# Patient Record
Sex: FEMALE | Race: WHITE | NOT HISPANIC OR LATINO | Employment: OTHER | URBAN - METROPOLITAN AREA
[De-identification: names, ages, dates, MRNs, and addresses within clinical notes are randomized per-mention and may not be internally consistent; named-entity substitution may affect disease eponyms.]

---

## 2017-01-20 DIAGNOSIS — H40.053 BORDERLINE GLAUCOMA OF BOTH EYES WITH OCULAR HYPERTENSION: Primary | ICD-10-CM

## 2017-01-24 ENCOUNTER — OFFICE VISIT (OUTPATIENT)
Dept: OPHTHALMOLOGY | Facility: CLINIC | Age: 82
End: 2017-01-24
Payer: MEDICARE

## 2017-01-24 DIAGNOSIS — H43.819 PVD (POSTERIOR VITREOUS DETACHMENT), UNSPECIFIED LATERALITY: ICD-10-CM

## 2017-01-24 DIAGNOSIS — H52.7 REFRACTIVE ERROR: ICD-10-CM

## 2017-01-24 DIAGNOSIS — H40.053 BORDERLINE GLAUCOMA OF BOTH EYES WITH OCULAR HYPERTENSION: Primary | ICD-10-CM

## 2017-01-24 DIAGNOSIS — Z96.1 PSEUDOPHAKIA: ICD-10-CM

## 2017-01-24 DIAGNOSIS — H25.12 NUCLEAR SCLEROSIS, LEFT: ICD-10-CM

## 2017-01-24 PROCEDURE — 92012 INTRM OPH EXAM EST PATIENT: CPT | Mod: S$PBB,,, | Performed by: OPHTHALMOLOGY

## 2017-01-24 PROCEDURE — 99213 OFFICE O/P EST LOW 20 MIN: CPT | Mod: PBBFAC,PO | Performed by: OPHTHALMOLOGY

## 2017-01-24 PROCEDURE — 99999 PR PBB SHADOW E&M-EST. PATIENT-LVL III: CPT | Mod: PBBFAC,,, | Performed by: OPHTHALMOLOGY

## 2017-01-24 RX ORDER — GABAPENTIN 300 MG/1
300 CAPSULE ORAL 3 TIMES DAILY
Refills: 5 | COMMUNITY
Start: 2016-12-08 | End: 2017-03-14

## 2017-01-24 RX ORDER — LATANOPROST 50 UG/ML
SOLUTION/ DROPS OPHTHALMIC
Qty: 8 ML | Refills: 3 | Status: SHIPPED | OUTPATIENT
Start: 2017-01-24 | End: 2017-01-24 | Stop reason: SDUPTHER

## 2017-01-24 RX ORDER — IBUPROFEN 400 MG/1
400 TABLET ORAL EVERY 6 HOURS PRN
COMMUNITY
End: 2020-01-28

## 2017-01-24 RX ORDER — DORZOLAMIDE HYDROCHLORIDE AND TIMOLOL MALEATE 20; 5 MG/ML; MG/ML
1 SOLUTION/ DROPS OPHTHALMIC 2 TIMES DAILY
Qty: 30 ML | Refills: 3 | Status: SHIPPED | OUTPATIENT
Start: 2017-01-24 | End: 2017-06-09 | Stop reason: SDUPTHER

## 2017-01-24 RX ORDER — MELOXICAM 15 MG/1
TABLET ORAL
Refills: 6 | COMMUNITY
Start: 2016-11-30 | End: 2017-03-14

## 2017-01-24 RX ORDER — LATANOPROST 50 UG/ML
SOLUTION/ DROPS OPHTHALMIC
Qty: 8 ML | Refills: 3 | Status: SHIPPED | OUTPATIENT
Start: 2017-01-24 | End: 2018-02-09 | Stop reason: SDUPTHER

## 2017-01-24 NOTE — MR AVS SNAPSHOT
[facility]  [address] [facility] [address]  [place] [address]  Phone: [phone]  Fax: [phone]                  Libby sEtrada   2017 8:00 AM   Office Visit    Description:  Female : 1935   Provider:  Edelmira Engel MD   Department:  [facility]           Reason for Visit     Glaucoma           Diagnoses this Visit        Comments    Borderline glaucoma of both eyes with ocular hypertension    -  Primary     Nuclear sclerosis, left         Pseudophakia         PVD (posterior vitreous detachment), unspecified laterality         Refractive error                To Do List           Goals (5 Years of Data)     None      Follow-Up and Disposition     Return in about 4 months (around 2017) for HVF, HRT, DFE, IOP check, MRx, BAT.       These Medications        Disp Refills Start End    dorzolamide-timolol 2-0.5% (COSOPT) 22.3-6.8 mg/mL ophthalmic solution 30 mL 3 2017     Place 1 drop into the right eye 2 (two) times daily. To replace Timolol (yellow lid) - Right Eye    Pharmacy: [facility]/pharmacy #[id number] - [place] - [id number] Ph #: [phone]       Notes to Pharmacy: 90 day supply    latanoprost 0.005 % ophthalmic solution 8 mL 3 2017     PLACE 1 DROP INTO BOTH EYES EVERY EVENING.    Pharmacy: [facility]/pharmacy #[id number] - [place] - [id number] Ph #: [phone]       Notes to Pharmacy: Please dispense 90 day supply      [facility]     [facility] -  Assistance  Registered nurses in the [facility] provide clinical advisement, health education, appointment booking, and other advisory services.  Call for this free service at [phone].             Medications           Message regarding Medications     Verify the changes and/or additions to your medication regime listed below are the same as discussed with your clinician today.  If any of these changes or additions are incorrect, please notify your  healthcare provider.             Verify that the below list of medications is an accurate representation of the medications you are currently taking.  If none reported, the list may be blank. If incorrect, please contact your healthcare provider. Carry this list with you in case of emergency.           Current Medications     aspirin (ECOTRIN) 81 MG EC tablet Take 81 mg by mouth once daily.      cyanocobalamin 2000 MCG tablet Take 2,000 mcg by mouth once daily.    dorzolamide-timolol 2-0.5% (COSOPT) 22.3-6.8 mg/mL ophthalmic solution Place 1 drop into the right eye 2 (two) times daily. To replace Timolol (yellow lid)    latanoprost 0.005 % ophthalmic solution PLACE 1 DROP INTO BOTH EYES EVERY EVENING.    MULTIVITAMIN W-MINERALS/LUTEIN (CENTRUM SILVER ORAL) Take by mouth.    PREVNAR 13, PF, 0.5 mL Syrg     gabapentin (NEURONTIN) 300 MG capsule Take 300 mg by mouth 3 (three) times daily.    ibuprofen (ADVIL,MOTRIN) 400 MG tablet Take 400 mg by mouth every 6 (six) hours as needed for Other. Not sure of mg    meloxicam (MOBIC) 15 MG tablet TAKE ONE BY MOUTH DAILY.           Clinical Reference Information           Vital Signs - Last Recorded     LMP                   (LMP Unknown)           Allergies as of 1/24/2017     Polysporin [Bacitracin-polymyxin B]    Codeine    Gamma Immune Glob From Whey    Penicillins    Sulfa (Sulfonamide Antibiotics)    Bacitracin      Immunizations Administered on Date of Encounter - 1/24/2017     None      Instructions      The doctor plans to dilate your eyes at your next visit. This will cause you to be sensitive to bright light, and may blur your vision. The effects may last a few hours. If you do not feel comfortable driving with your eyes dilated, please have someone drive you to your appointment.

## 2017-01-24 NOTE — PATIENT INSTRUCTIONS
The doctor plans to dilate your eyes at your next visit. This will cause you to be sensitive to bright light, and may blur your vision. The effects may last a few hours. If you do not feel comfortable driving with your eyes dilated, please have someone drive you to your appointment.

## 2017-03-01 ENCOUNTER — PATIENT OUTREACH (OUTPATIENT)
Dept: ADMINISTRATIVE | Facility: HOSPITAL | Age: 82
End: 2017-03-01

## 2017-03-01 NOTE — LETTER
March 1, 2017    Libby Estrada  [address]  [facility] [address]             [facility]  [address]  [facility] [address]  Phone: [phone] Dear Mrs. Estrada:    [facility] is committed to your overall health.  To help you get the most out of each of your visits, we will review your information to make sure you are up to date on all of your recommended tests and/or procedures.      Dr. Salas      has found that you may be due for:    Tetanus immunization  Pneumonia immunization  Influenza vaccine    If you have had any of the above done at another facility, please bring the records or information with you so that your record at [facility] will be complete.     If you are currently taking medication , please bring it with you to your appointment for review.    If you have any questions or concerns, please don't hesitate to call.    Sincerely,  Brenna Boyer  Clinical Care Coordinator  [facility]  [address] [place] [address]  Phone: [phone]   Fax: [phone]

## 2017-03-14 ENCOUNTER — OFFICE VISIT (OUTPATIENT)
Dept: FAMILY MEDICINE | Facility: CLINIC | Age: 82
End: 2017-03-14
Payer: MEDICARE

## 2017-03-14 VITALS
WEIGHT: 149.94 LBS | OXYGEN SATURATION: 97 % | BODY MASS INDEX: 24.98 KG/M2 | HEART RATE: 94 BPM | RESPIRATION RATE: 18 BRPM | DIASTOLIC BLOOD PRESSURE: 72 MMHG | HEIGHT: 65 IN | SYSTOLIC BLOOD PRESSURE: 132 MMHG

## 2017-03-14 DIAGNOSIS — R73.03 BORDERLINE DIABETES: ICD-10-CM

## 2017-03-14 DIAGNOSIS — M48.00 SPINAL STENOSIS, UNSPECIFIED SPINAL REGION: ICD-10-CM

## 2017-03-14 DIAGNOSIS — R73.9 ELEVATED BLOOD SUGAR: ICD-10-CM

## 2017-03-14 DIAGNOSIS — E78.5 HYPERLIPIDEMIA, UNSPECIFIED HYPERLIPIDEMIA TYPE: Primary | ICD-10-CM

## 2017-03-14 DIAGNOSIS — Z85.810 HISTORY OF TONGUE CANCER: ICD-10-CM

## 2017-03-14 DIAGNOSIS — Z23 NEED FOR TETANUS BOOSTER: ICD-10-CM

## 2017-03-14 PROCEDURE — 99214 OFFICE O/P EST MOD 30 MIN: CPT | Mod: S$PBB,,, | Performed by: FAMILY MEDICINE

## 2017-03-14 PROCEDURE — 90714 TD VACC NO PRESV 7 YRS+ IM: CPT | Mod: PBBFAC,PO | Performed by: FAMILY MEDICINE

## 2017-03-14 PROCEDURE — 99213 OFFICE O/P EST LOW 20 MIN: CPT | Mod: PBBFAC,PO,25 | Performed by: FAMILY MEDICINE

## 2017-03-14 PROCEDURE — 99999 PR PBB SHADOW E&M-EST. PATIENT-LVL III: CPT | Mod: PBBFAC,,, | Performed by: FAMILY MEDICINE

## 2017-03-14 RX ORDER — CHOLECALCIFEROL (VITAMIN D3) 25 MCG
2000 TABLET ORAL DAILY
COMMUNITY
End: 2017-12-20

## 2017-03-14 NOTE — MR AVS SNAPSHOT
[facility]  [address]  [facility] [address]  Phone: [phone]  Fax: [phone]                  Libby Estrada   3/14/2017 2:20 PM   Office Visit    Description:  Female : 1935   Provider:  DOMINIC Salas MD   Department:  [facility]           Reason for Visit     Annual Exam           Diagnoses this Visit        Comments    Hyperlipidemia, unspecified hyperlipidemia type    -  Primary     Borderline diabetes         History of tongue cancer         Elevated blood sugar         Spinal stenosis, unspecified spinal region         Need for tetanus booster                To Do List           Future Appointments        Provider Department Dept Phone    3/15/2017 10:55 AM LAB, [place] [facility] [phone]    2017 9:00 AM [facility] [phone]    2017 9:30 AM [facility] [phone]    2017 10:15 AM Edelmira Engel MD [facility] [phone]    2017 10:40 AM DOMINIC Salas MD [facility] [phone]      Goals (5 Years of Data)     None      Follow-Up and Disposition     Return in about 6 months (around 2017), or if symptoms worsen or fail to improve.      [facility]     [facility] Nurse Care [facility] - 24/7 Assistance  Registered nurses in the [facility] provide clinical advisement, health education, appointment booking, and other advisory services.  Call for this free service at [phone].             Medications           Message regarding Medications     Verify the changes and/or additions to your medication regime listed below are the same as discussed with your clinician today.  If any of these changes or additions are incorrect, please notify your healthcare provider.        STOP taking these medications     gabapentin (NEURONTIN) 300 MG capsule Take 300 mg by mouth 3  "(three) times daily.    meloxicam (MOBIC) 15 MG tablet TAKE ONE BY MOUTH DAILY.    PREVNAR 13, PF, 0.5 mL Syrg     cyanocobalamin 2000 MCG tablet Take 2,000 mcg by mouth once daily.           Verify that the below list of medications is an accurate representation of the medications you are currently taking.  If none reported, the list may be blank. If incorrect, please contact your healthcare provider. Carry this list with you in case of emergency.           Current Medications     aspirin (ECOTRIN) 81 MG EC tablet Take 81 mg by mouth once daily.      dorzolamide-timolol 2-0.5% (COSOPT) 22.3-6.8 mg/mL ophthalmic solution Place 1 drop into the right eye 2 (two) times daily. To replace Timolol (yellow lid)    ibuprofen (ADVIL,MOTRIN) 400 MG tablet Take 400 mg by mouth every 6 (six) hours as needed for Other. Not sure of mg    latanoprost 0.005 % ophthalmic solution PLACE 1 DROP INTO BOTH EYES EVERY EVENING.    MULTIVITAMIN W-MINERALS/LUTEIN (CENTRUM SILVER ORAL) Take by mouth.    vitamin D 1000 units Tab Take 2,000 Units by mouth once daily.           Clinical Reference Information           Your Vitals Were     BP Pulse Resp Height Weight Last Period    144/72 (BP Location: Left arm, Patient Position: Sitting, BP Method: Manual) 94 18 5' 5" (1.651 m) 68 kg (149 lb 14.6 oz) (LMP Unknown)    SpO2 BMI             97% 24.95 kg/m2         Blood Pressure          Most Recent Value    BP  (!)  144/72      Allergies as of 3/14/2017     Polysporin [Bacitracin-polymyxin B]    Codeine    Gamma Immune Glob From Whey    Lidocaine    Penicillins    Sulfa (Sulfonamide Antibiotics)    Bacitracin      Immunizations Administered on Date of Encounter - 3/14/2017     Name Date Dose VIS Date Route    TD 3/14/2017 0.5 mL 2/24/2015 Intramuscular      Orders Placed During Today's Visit      Normal Orders This Visit    Ambulatory referral to ENT     Td Vaccine- Preservative Free     Future Labs/Procedures Expected by Expires    CBC auto " differential  3/14/2017 9/10/2017    Comprehensive metabolic panel  3/14/2017 9/10/2017    Hemoglobin A1c  3/14/2017 9/10/2017    Lipid panel  3/14/2017 9/10/2017    TSH  3/14/2017 9/10/2017      Language Assistance Services     ATTENTION: Language assistance services are available, free of charge. Please call [phone].      ATENCIÓN: Si habla español, tiene a aguillon disposición servicios gratuitos de asistencia lingüística. Llame al [phone].     CHÚ Ý: N?u b?n nói Ti?ng Vi?t, có các d?ch v? h? tr? ngôn ng? mi?n phí dành cho b?n. G?i s? [phone].         [facility] complies with applicable Federal civil rights laws and does not discriminate on the basis of race, color, national origin, age, disability, or sex.

## 2017-03-14 NOTE — PROGRESS NOTES
Subjective:       Patient ID: Libby Estrada is a 82 y.o. female.    Chief Complaint: Annual Exam    HPI Comments: Here for f/u chronic medical issues.  States doing well overall.  Dealing with spinal stenosis and doing pool therapy.  Had tongue cancer removed from oral surgeon but paying out of cash so would like to see ENT.      Review of Systems   Constitutional: Negative for chills, fatigue and fever.   Respiratory: Negative for cough, chest tightness and shortness of breath.    Cardiovascular: Negative for chest pain, palpitations and leg swelling.   Endocrine: Negative for cold intolerance and heat intolerance.   Musculoskeletal: Positive for back pain.   Skin: Negative for rash and wound.   Psychiatric/Behavioral: Negative for dysphoric mood. The patient is not nervous/anxious.        Objective:      Physical Exam   Constitutional: She appears well-developed and well-nourished.   HENT:   Head: Normocephalic and atraumatic.   Cardiovascular: Normal rate, regular rhythm and normal heart sounds.    Pulmonary/Chest: Effort normal and breath sounds normal.   Psychiatric: She has a normal mood and affect.   Nursing note and vitals reviewed.      Assessment:       1. Hyperlipidemia, unspecified hyperlipidemia type    2. Borderline diabetes    3. History of tongue cancer    4. Elevated blood sugar    5. Spinal stenosis, unspecified spinal region    6. Need for tetanus booster        Plan:       Hyperlipidemia, unspecified hyperlipidemia type  -     CBC auto differential; Future; Expected date: 3/14/17  -     Comprehensive metabolic panel; Future; Expected date: 3/14/17  -     Lipid panel; Future; Expected date: 3/14/17  -     Hemoglobin A1c; Future; Expected date: 3/14/17  -     TSH; Future; Expected date: 3/14/17    Borderline diabetes  -     CBC auto differential; Future; Expected date: 3/14/17  -     Comprehensive metabolic panel; Future; Expected date: 3/14/17  -     Lipid panel; Future; Expected date: 3/14/17  -      Hemoglobin A1c; Future; Expected date: 3/14/17  -     TSH; Future; Expected date: 3/14/17    History of tongue cancer  -     Ambulatory referral to ENT  -     CBC auto differential; Future; Expected date: 3/14/17  -     Comprehensive metabolic panel; Future; Expected date: 3/14/17  -     Lipid panel; Future; Expected date: 3/14/17  -     Hemoglobin A1c; Future; Expected date: 3/14/17  -     TSH; Future; Expected date: 3/14/17    Elevated blood sugar  -     Hemoglobin A1c; Future; Expected date: 3/14/17    Spinal stenosis, unspecified spinal region    Need for tetanus booster  -     Td Vaccine- Preservative Free    Will monitor chronic medical issues and continue current plan of care.  Update labs and health maintenance.  Return in about 6 months (around 9/14/2017), or if symptoms worsen or fail to improve.

## 2017-03-15 ENCOUNTER — LAB VISIT (OUTPATIENT)
Dept: LAB | Facility: HOSPITAL | Age: 82
End: 2017-03-15
Attending: FAMILY MEDICINE
Payer: MEDICARE

## 2017-03-15 DIAGNOSIS — Z85.810 HISTORY OF TONGUE CANCER: ICD-10-CM

## 2017-03-15 DIAGNOSIS — R73.9 ELEVATED BLOOD SUGAR: ICD-10-CM

## 2017-03-15 DIAGNOSIS — E78.5 HYPERLIPIDEMIA, UNSPECIFIED HYPERLIPIDEMIA TYPE: ICD-10-CM

## 2017-03-15 DIAGNOSIS — R73.03 BORDERLINE DIABETES: ICD-10-CM

## 2017-03-15 LAB
ALBUMIN SERPL BCP-MCNC: 3.8 G/DL
ALP SERPL-CCNC: 61 U/L
ALT SERPL W/O P-5'-P-CCNC: 11 U/L
ANION GAP SERPL CALC-SCNC: 10 MMOL/L
AST SERPL-CCNC: 15 U/L
BASOPHILS # BLD AUTO: 0.02 K/UL
BASOPHILS NFR BLD: 0.3 %
BILIRUB SERPL-MCNC: 0.4 MG/DL
BUN SERPL-MCNC: 14 MG/DL
CALCIUM SERPL-MCNC: 9.7 MG/DL
CHLORIDE SERPL-SCNC: 104 MMOL/L
CHOLEST/HDLC SERPL: 4 {RATIO}
CO2 SERPL-SCNC: 26 MMOL/L
CREAT SERPL-MCNC: 0.8 MG/DL
DIFFERENTIAL METHOD: NORMAL
EOSINOPHIL # BLD AUTO: 0.2 K/UL
EOSINOPHIL NFR BLD: 2.4 %
ERYTHROCYTE [DISTWIDTH] IN BLOOD BY AUTOMATED COUNT: 13.2 %
EST. GFR  (AFRICAN AMERICAN): >60 ML/MIN/1.73 M^2
EST. GFR  (NON AFRICAN AMERICAN): >60 ML/MIN/1.73 M^2
GLUCOSE SERPL-MCNC: 94 MG/DL
HCT VFR BLD AUTO: 38.4 %
HDL/CHOLESTEROL RATIO: 24.9 %
HDLC SERPL-MCNC: 237 MG/DL
HDLC SERPL-MCNC: 59 MG/DL
HGB BLD-MCNC: 12.7 G/DL
LDLC SERPL CALC-MCNC: 157 MG/DL
LYMPHOCYTES # BLD AUTO: 1.7 K/UL
LYMPHOCYTES NFR BLD: 27.1 %
MCH RBC QN AUTO: 28.8 PG
MCHC RBC AUTO-ENTMCNC: 33.1 %
MCV RBC AUTO: 87 FL
MONOCYTES # BLD AUTO: 0.5 K/UL
MONOCYTES NFR BLD: 8.2 %
NEUTROPHILS # BLD AUTO: 3.9 K/UL
NEUTROPHILS NFR BLD: 62 %
NONHDLC SERPL-MCNC: 178 MG/DL
PLATELET # BLD AUTO: 254 K/UL
PMV BLD AUTO: 10.2 FL
POTASSIUM SERPL-SCNC: 4.3 MMOL/L
PROT SERPL-MCNC: 7.7 G/DL
RBC # BLD AUTO: 4.41 M/UL
SODIUM SERPL-SCNC: 140 MMOL/L
TRIGL SERPL-MCNC: 105 MG/DL
TSH SERPL DL<=0.005 MIU/L-ACNC: 0.72 UIU/ML
WBC # BLD AUTO: 6.23 K/UL

## 2017-03-15 PROCEDURE — 85025 COMPLETE CBC W/AUTO DIFF WBC: CPT

## 2017-03-15 PROCEDURE — 83036 HEMOGLOBIN GLYCOSYLATED A1C: CPT

## 2017-03-15 PROCEDURE — 80061 LIPID PANEL: CPT

## 2017-03-15 PROCEDURE — 84443 ASSAY THYROID STIM HORMONE: CPT

## 2017-03-15 PROCEDURE — 80053 COMPREHEN METABOLIC PANEL: CPT

## 2017-03-15 PROCEDURE — 36415 COLL VENOUS BLD VENIPUNCTURE: CPT | Mod: PO

## 2017-03-16 LAB
ESTIMATED AVG GLUCOSE: 123 MG/DL
HBA1C MFR BLD HPLC: 5.9 %

## 2017-05-17 ENCOUNTER — TELEPHONE (OUTPATIENT)
Dept: OPHTHALMOLOGY | Facility: CLINIC | Age: 82
End: 2017-05-17

## 2017-05-17 NOTE — TELEPHONE ENCOUNTER
----- Message from Jaimie Cortez sent at 5/17/2017 12:38 PM CDT -----  Contact: self  Patient has an appointment on 5/823/17 for visual field   She would like to reschedule    Please call her at  to reschedule    Thanks

## 2017-05-26 ENCOUNTER — CLINICAL SUPPORT (OUTPATIENT)
Dept: OPHTHALMOLOGY | Facility: CLINIC | Age: 82
End: 2017-05-26
Attending: OPHTHALMOLOGY
Payer: MEDICARE

## 2017-05-26 DIAGNOSIS — H40.053 BORDERLINE GLAUCOMA OF BOTH EYES WITH OCULAR HYPERTENSION: ICD-10-CM

## 2017-05-26 PROCEDURE — 92083 EXTENDED VISUAL FIELD XM: CPT | Mod: PBBFAC,PO

## 2017-05-26 PROCEDURE — 92083 EXTENDED VISUAL FIELD XM: CPT | Mod: 26,S$PBB,, | Performed by: OPHTHALMOLOGY

## 2017-05-30 ENCOUNTER — PATIENT MESSAGE (OUTPATIENT)
Dept: OPHTHALMOLOGY | Facility: CLINIC | Age: 82
End: 2017-05-30

## 2017-06-09 ENCOUNTER — OFFICE VISIT (OUTPATIENT)
Dept: OPHTHALMOLOGY | Facility: CLINIC | Age: 82
End: 2017-06-09
Payer: MEDICARE

## 2017-06-09 DIAGNOSIS — Z96.1 PSEUDOPHAKIA: ICD-10-CM

## 2017-06-09 DIAGNOSIS — H26.491 POSTERIOR CAPSULE OPACIFICATION, RIGHT: ICD-10-CM

## 2017-06-09 DIAGNOSIS — H40.053 BORDERLINE GLAUCOMA OF BOTH EYES WITH OCULAR HYPERTENSION: Primary | ICD-10-CM

## 2017-06-09 DIAGNOSIS — H25.12 NUCLEAR SCLEROSIS, LEFT: ICD-10-CM

## 2017-06-09 DIAGNOSIS — H43.811 PVD (POSTERIOR VITREOUS DETACHMENT), RIGHT: ICD-10-CM

## 2017-06-09 DIAGNOSIS — R73.03 BORDERLINE DIABETES: ICD-10-CM

## 2017-06-09 DIAGNOSIS — H52.7 REFRACTIVE ERROR: ICD-10-CM

## 2017-06-09 PROCEDURE — 99999 PR PBB SHADOW E&M-EST. PATIENT-LVL II: CPT | Mod: PBBFAC,,, | Performed by: OPHTHALMOLOGY

## 2017-06-09 PROCEDURE — 92134 CPTRZ OPH DX IMG PST SGM RTA: CPT | Mod: 50,PBBFAC,PO | Performed by: OPHTHALMOLOGY

## 2017-06-09 PROCEDURE — 92014 COMPRE OPH EXAM EST PT 1/>: CPT | Mod: S$PBB,,, | Performed by: OPHTHALMOLOGY

## 2017-06-09 PROCEDURE — 99212 OFFICE O/P EST SF 10 MIN: CPT | Mod: PBBFAC,PO | Performed by: OPHTHALMOLOGY

## 2017-06-09 RX ORDER — DORZOLAMIDE HYDROCHLORIDE AND TIMOLOL MALEATE 20; 5 MG/ML; MG/ML
1 SOLUTION/ DROPS OPHTHALMIC 2 TIMES DAILY
Qty: 30 ML | Refills: 3 | Status: SHIPPED | OUTPATIENT
Start: 2017-06-09 | End: 2018-08-08 | Stop reason: SDUPTHER

## 2017-06-09 NOTE — PROGRESS NOTES
HPI     4 month IOP check , denies eye pain, using all eye gtts as directed   Latanoprost ou HS, Cosopt OD BID    Agree with above. Did not miss drops recently.     Last edited by Edelmira Engel MD on 6/9/2017 11:37 AM.   (History)        ROS     Positive for: Gastrointestinal (GERD - now resolved off meds),   Musculoskeletal (back pain, worse in am when she gets up), Cardiovascular   (treated for cholesterol, BP ok), Eyes (ocular HTN), Heme/Lymph (ASA)    Negative for: Neurological (denies seizure/tremor/restless legs),   Genitourinary (denies flomax), Endocrine (no DM or thyroid problems),   Respiratory (denies SOB/asthma/orthopnea)    Last edited by Edelmira Engel MD on 6/9/2017 11:37 AM.   (History)        Assessment /Plan     For exam results, see Encounter Report.    Borderline glaucoma of both eyes with ocular hypertension  -     [facility] (HRT) - OU - Both Eyes  -     [facility] (HRT) - OU - Both Eyes    Borderline diabetes    Nuclear sclerosis, left    Pseudophakia    PVD (posterior vitreous detachment), unspecified laterality    Refractive error             Senile nuclear sclerosis - s/p Phaco/PCIOL OD, doing well. OS approaching visual significance.   Dilates well, denies flomax.    1. Ocular hypertension - IOP had been creeping up again, despite no back injections for 2 years - doing better with Latanoprost QHS OU and Cosopt BID OD. Clinical exam appears stable. Possible progression on previous HRT but most recent closer to priors - cataract may have been interfering. Recent HVF WNL again. Thick CCT. Continue Cosopt BID OD only, changed Lumigan to Latanoprost QHS OU due to cost. Doing better with more frequent non-preserved artificial tears to help with burning. If still feels/sees lousy, will go back to Timolol and Lumigan only.     Possible slight clinical change in C:D OS - will switch Cosopt to BID OU today 6/9/17.    Possible steroid  response component - had been getting steroid shots for back. Last gonio open but abnormal vessel OS (stable). IOP went slightly higher OD after CE on Prednisolone.     Consider blue-yellow HVF in future after CE if white WNL and HRT shows change.    IOP creeping up OS as well - monitor closely - f/u 4 months IOP check.    2.    3. PVD (posterior vitreous detachment) - RD precautions   4. Hyperopia with presbyopia - target emmetropia - toric not recommended based on K's. MRx stable   5. 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 Observe. Not NVA       RTC 4 months - IOP check OU

## 2017-06-09 NOTE — PATIENT INSTRUCTIONS
What Is YAG Capsulotomy?  YAG capsulotomy is a laser eye treatment. It is used to improve your vision after cataract surgery. Your vision can become cloudy again after cataract surgery. This is sometimes called an after cataract. But it isnt a new cataract. Instead, your posterior capsule, which holds the lens in place, becomes cloudy. Your eye doctor may use YAG capsulotomy to help you see better.        Your eye after cataract surgery  When your cataract is removed, your eyes cloudy lens is replaced with a plastic lens called an IOL (intraocular lens). The IOL is placed in the posterior capsule, which held the old cloudy lens. You can see again because the IOL lets light reach your retina. The retina is a tissue layer that lines the back of your eye.  If the capsule becomes cloudy  The posterior capsule is a thin, clear film. It can become cloudy. This can happen months or even years after cataract surgery. This may block light from reaching your retina.  Date Last Reviewed: 6/13/2015  © 8281-1740 [employer]. [address] [place] [address]. All rights reserved. This information is not intended as a substitute for professional medical care. Always follow your healthcare professional's instructions.        YAG Capsulotomy: How a YAG Laser Works  A laser is a strong beam of energy that can be focused on a tiny point. A laser can be precisely controlled. This makes it safe and reliable.  The YAG laser  For a capsulotomy, your eye doctor uses a YAG laser. The YAG laser gives off fast, tiny bursts of energy. A special contact lens may be used to help focus the laser on the right spot. The laser passes through the front of your eye. It also passes through your new IOL (intraocular lens). It doesnt harm them. When the laser reaches your posterior capsule, it makes a tiny opening. Light can then enter your eye again. Your posterior capsule is still able to hold your IOL in place.     Date  Last Reviewed: 6/13/2015  © 3123-8373 [employer]. [address] [place] [address], [place] [address]. All rights reserved. This information is not intended as a substitute for professional medical care. Always follow your healthcare professional's instructions.        YAG Capsulotomy: Your Experience  YAG capsulotomy is done in your eye doctors office or at an outpatient surgery center. The treatment is usually quick and painless. You can most often return to your normal routine right away. There are no needles or stitches. There is no risk of infection. Your vision will most likely be fully restored soon after treatment.  Possible risks  Laser treatment is safe. But all procedures have some risks. The risks of this treatment include:  · Your eye pressure may rise, usually only for a short time.  · The laser can scratch your IOL. But this almost never affects vision.  · In rare cases, the retina may become detached.     Before capsulotomy  Ask your eye doctor if you need to have someone drive you to and from your treatment. Most people see clearly again right away and go home shortly after treatment.  During capsulotomy  First, your eye is numbed and your pupil is widened (dilated) with eye drops. Then, you rest your chin on a  front of the laser machine. You may see flashes of light and hear a faint clicking sound as the laser enters your eye. But you should not feel any pain. You can help by staying relaxed and still.  After capsulotomy  You should begin to see better in a few hours. Your eye doctor may check your eye pressure later that day or the next. He or she may also give you eye drops or an ointment. Once the posterior capsule (the part of the eye that holds the lens in place) has been opened, it wont make your vision cloudy again.     Call your eye doctor right away if you have any of the following:  · Increased pain  · Sudden decrease in vision  · Increased flashing lights or floaters  · A  shadow covering your vision   Date Last Reviewed: 6/13/2015  © 1987-4241 [employer]. [address] [place] [address], [place] [address]. All rights reserved. This information is not intended as a substitute for professional medical care. Always follow your healthcare professional's instructions.

## 2017-09-14 ENCOUNTER — TELEPHONE (OUTPATIENT)
Dept: FAMILY MEDICINE | Facility: CLINIC | Age: 82
End: 2017-09-14

## 2017-09-14 ENCOUNTER — OFFICE VISIT (OUTPATIENT)
Dept: FAMILY MEDICINE | Facility: CLINIC | Age: 82
End: 2017-09-14
Payer: MEDICARE

## 2017-09-14 VITALS
BODY MASS INDEX: 25.23 KG/M2 | SYSTOLIC BLOOD PRESSURE: 132 MMHG | WEIGHT: 151.44 LBS | HEIGHT: 65 IN | DIASTOLIC BLOOD PRESSURE: 82 MMHG | HEART RATE: 74 BPM

## 2017-09-14 DIAGNOSIS — R73.03 BORDERLINE DIABETES: ICD-10-CM

## 2017-09-14 DIAGNOSIS — Z85.810 HISTORY OF TONGUE CANCER: ICD-10-CM

## 2017-09-14 DIAGNOSIS — E78.5 HYPERLIPIDEMIA, UNSPECIFIED HYPERLIPIDEMIA TYPE: Primary | ICD-10-CM

## 2017-09-14 DIAGNOSIS — D22.9 NUMEROUS MOLES: ICD-10-CM

## 2017-09-14 PROCEDURE — 3079F DIAST BP 80-89 MM HG: CPT | Mod: ,,, | Performed by: FAMILY MEDICINE

## 2017-09-14 PROCEDURE — G0008 ADMIN INFLUENZA VIRUS VAC: HCPCS | Mod: PBBFAC,PO

## 2017-09-14 PROCEDURE — 99213 OFFICE O/P EST LOW 20 MIN: CPT | Mod: PBBFAC,PO | Performed by: FAMILY MEDICINE

## 2017-09-14 PROCEDURE — 99214 OFFICE O/P EST MOD 30 MIN: CPT | Mod: S$PBB,,, | Performed by: FAMILY MEDICINE

## 2017-09-14 PROCEDURE — 3075F SYST BP GE 130 - 139MM HG: CPT | Mod: ,,, | Performed by: FAMILY MEDICINE

## 2017-09-14 PROCEDURE — 99999 PR PBB SHADOW E&M-EST. PATIENT-LVL III: CPT | Mod: PBBFAC,,, | Performed by: FAMILY MEDICINE

## 2017-09-14 PROCEDURE — 1125F AMNT PAIN NOTED PAIN PRSNT: CPT | Mod: ,,, | Performed by: FAMILY MEDICINE

## 2017-09-14 PROCEDURE — 1159F MED LIST DOCD IN RCRD: CPT | Mod: ,,, | Performed by: FAMILY MEDICINE

## 2017-09-14 NOTE — PROGRESS NOTES
"Subjective:       Patient ID: Libby Estrada is a 82 y.o. female.    Chief Complaint: Hyperlipidemia (Patient here for 6 month follow up) and Joint Pain (Right thumb joint is popping "out of place" and is causing discomfort. )    Here for f/u chronic medical issues.  States doing well overall.  Also right thumb "popping" for last week or so.  She would like several specialist referrals.      Hyperlipidemia   This is a chronic problem. The current episode started more than 1 year ago. The problem is controlled. Recent lipid tests were reviewed and are normal. Pertinent negatives include no chest pain or shortness of breath.   Joint Pain   Pertinent negatives include no abdominal pain, chest pain, chills, coughing, fatigue, fever or rash.     Review of Systems   Constitutional: Negative for chills, fatigue and fever.   Respiratory: Negative for cough, chest tightness and shortness of breath.    Cardiovascular: Negative for chest pain, palpitations and leg swelling.   Gastrointestinal: Negative for abdominal distention and abdominal pain.   Endocrine: Negative for cold intolerance and heat intolerance.   Skin: Negative for rash.   Psychiatric/Behavioral: Negative for dysphoric mood. The patient is not nervous/anxious.        Objective:      Physical Exam   Constitutional: She appears well-developed and well-nourished.   HENT:   Head: Normocephalic and atraumatic.   Cardiovascular: Normal rate, regular rhythm and normal heart sounds.    Pulmonary/Chest: Effort normal and breath sounds normal.   Musculoskeletal: Normal range of motion.   Psychiatric: She has a normal mood and affect.   Nursing note and vitals reviewed.      Assessment:       1. Hyperlipidemia, unspecified hyperlipidemia type    2. Numerous moles    3. History of tongue cancer    4. Borderline diabetes        Plan:       Hyperlipidemia, unspecified hyperlipidemia type  -     CBC auto differential; Future; Expected date: 09/14/2017  -     Comprehensive " metabolic panel; Future; Expected date: 09/14/2017  -     Lipid panel; Future; Expected date: 09/14/2017  -     TSH; Future; Expected date: 09/14/2017  -     Hemoglobin A1c; Future; Expected date: 09/14/2017    Numerous moles  -     Ambulatory referral to Dermatology    History of tongue cancer  -     Ambulatory referral to ENT    Borderline diabetes  -     CBC auto differential; Future; Expected date: 09/14/2017  -     Comprehensive metabolic panel; Future; Expected date: 09/14/2017  -     Lipid panel; Future; Expected date: 09/14/2017  -     TSH; Future; Expected date: 09/14/2017  -     Hemoglobin A1c; Future; Expected date: 09/14/2017      Update labs and maintenance.  Will monitor chronic medical issues and continue current plan of care.      Return in about 6 months (around 3/14/2018), or if symptoms worsen or fail to improve.

## 2017-09-20 ENCOUNTER — OFFICE VISIT (OUTPATIENT)
Dept: OTOLARYNGOLOGY | Facility: CLINIC | Age: 82
End: 2017-09-20
Payer: MEDICARE

## 2017-09-20 ENCOUNTER — LAB VISIT (OUTPATIENT)
Dept: LAB | Facility: HOSPITAL | Age: 82
End: 2017-09-20
Attending: FAMILY MEDICINE
Payer: MEDICARE

## 2017-09-20 VITALS
HEIGHT: 65 IN | WEIGHT: 149.69 LBS | SYSTOLIC BLOOD PRESSURE: 165 MMHG | BODY MASS INDEX: 24.94 KG/M2 | HEART RATE: 74 BPM | DIASTOLIC BLOOD PRESSURE: 85 MMHG

## 2017-09-20 DIAGNOSIS — E78.5 HYPERLIPIDEMIA, UNSPECIFIED HYPERLIPIDEMIA TYPE: ICD-10-CM

## 2017-09-20 DIAGNOSIS — R73.03 BORDERLINE DIABETES: ICD-10-CM

## 2017-09-20 DIAGNOSIS — D00.07 SQUAMOUS CELL CARCINOMA IN SITU OF LATERAL TONGUE: ICD-10-CM

## 2017-09-20 LAB
ALBUMIN SERPL BCP-MCNC: 3.8 G/DL
ALP SERPL-CCNC: 61 U/L
ALT SERPL W/O P-5'-P-CCNC: 12 U/L
ANION GAP SERPL CALC-SCNC: 7 MMOL/L
AST SERPL-CCNC: 16 U/L
BASOPHILS # BLD AUTO: 0.05 K/UL
BASOPHILS NFR BLD: 0.8 %
BILIRUB SERPL-MCNC: 0.4 MG/DL
BUN SERPL-MCNC: 12 MG/DL
CALCIUM SERPL-MCNC: 9.9 MG/DL
CHLORIDE SERPL-SCNC: 102 MMOL/L
CHOLEST SERPL-MCNC: 262 MG/DL
CHOLEST/HDLC SERPL: 3.8 {RATIO}
CO2 SERPL-SCNC: 30 MMOL/L
CREAT SERPL-MCNC: 0.9 MG/DL
DIFFERENTIAL METHOD: NORMAL
EOSINOPHIL # BLD AUTO: 0.2 K/UL
EOSINOPHIL NFR BLD: 3.7 %
ERYTHROCYTE [DISTWIDTH] IN BLOOD BY AUTOMATED COUNT: 13.6 %
EST. GFR  (AFRICAN AMERICAN): >60 ML/MIN/1.73 M^2
EST. GFR  (NON AFRICAN AMERICAN): >60 ML/MIN/1.73 M^2
ESTIMATED AVG GLUCOSE: 120 MG/DL
GLUCOSE SERPL-MCNC: 116 MG/DL
HBA1C MFR BLD HPLC: 5.8 %
HCT VFR BLD AUTO: 38.3 %
HDLC SERPL-MCNC: 69 MG/DL
HDLC SERPL: 26.3 %
HGB BLD-MCNC: 13.1 G/DL
LDLC SERPL CALC-MCNC: 170.4 MG/DL
LYMPHOCYTES # BLD AUTO: 1.8 K/UL
LYMPHOCYTES NFR BLD: 28.2 %
MCH RBC QN AUTO: 29 PG
MCHC RBC AUTO-ENTMCNC: 34.2 G/DL
MCV RBC AUTO: 85 FL
MONOCYTES # BLD AUTO: 0.5 K/UL
MONOCYTES NFR BLD: 8.4 %
NEUTROPHILS # BLD AUTO: 3.6 K/UL
NEUTROPHILS NFR BLD: 58.6 %
NONHDLC SERPL-MCNC: 193 MG/DL
PLATELET # BLD AUTO: 269 K/UL
PMV BLD AUTO: 10 FL
POTASSIUM SERPL-SCNC: 4.8 MMOL/L
PROT SERPL-MCNC: 7.9 G/DL
RBC # BLD AUTO: 4.52 M/UL
SODIUM SERPL-SCNC: 139 MMOL/L
TRIGL SERPL-MCNC: 113 MG/DL
TSH SERPL DL<=0.005 MIU/L-ACNC: 1 UIU/ML
WBC # BLD AUTO: 6.2 K/UL

## 2017-09-20 PROCEDURE — 99999 PR PBB SHADOW E&M-EST. PATIENT-LVL III: CPT | Mod: PBBFAC,,, | Performed by: OTOLARYNGOLOGY

## 2017-09-20 PROCEDURE — 84443 ASSAY THYROID STIM HORMONE: CPT

## 2017-09-20 PROCEDURE — 99213 OFFICE O/P EST LOW 20 MIN: CPT | Mod: PBBFAC,PO | Performed by: OTOLARYNGOLOGY

## 2017-09-20 PROCEDURE — 85025 COMPLETE CBC W/AUTO DIFF WBC: CPT

## 2017-09-20 PROCEDURE — 3077F SYST BP >= 140 MM HG: CPT | Mod: ,,, | Performed by: OTOLARYNGOLOGY

## 2017-09-20 PROCEDURE — 1159F MED LIST DOCD IN RCRD: CPT | Mod: ,,, | Performed by: OTOLARYNGOLOGY

## 2017-09-20 PROCEDURE — 1126F AMNT PAIN NOTED NONE PRSNT: CPT | Mod: ,,, | Performed by: OTOLARYNGOLOGY

## 2017-09-20 PROCEDURE — 80061 LIPID PANEL: CPT

## 2017-09-20 PROCEDURE — 3079F DIAST BP 80-89 MM HG: CPT | Mod: ,,, | Performed by: OTOLARYNGOLOGY

## 2017-09-20 PROCEDURE — 80053 COMPREHEN METABOLIC PANEL: CPT

## 2017-09-20 PROCEDURE — 99203 OFFICE O/P NEW LOW 30 MIN: CPT | Mod: S$PBB,,, | Performed by: OTOLARYNGOLOGY

## 2017-09-20 PROCEDURE — 83036 HEMOGLOBIN GLYCOSYLATED A1C: CPT

## 2017-09-20 PROCEDURE — 36415 COLL VENOUS BLD VENIPUNCTURE: CPT | Mod: PO

## 2017-09-20 NOTE — LETTER
September 20, 2017        DOMINIC Salas MD  [address] [facility] [place] [address]             [facility]  [address] [place] [address]  Phone: [phone]  Fax: [phone]   Patient: Libby Estrada   MR Number: [id number]   YOB: 1935   Date of Visit: 9/20/2017       Dear Dr. Salas:    Thank you for referring Libby Estrada to me for evaluation. Below are the relevant portions of my assessment and plan of care.        1. 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 She is doing well from that perspective. I encouraged her to discontinue listerine use as this may be a chronic irritant.   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 This does not appear concerning today but will need to be monitored.  We will request her old records.  Follow-up with me in 3 months      If you have questions, please do not hesitate to call me. I look forward to following Libby along with you.    Sincerely,      Tobin Lobato MD           CC  No Recipients

## 2017-09-20 NOTE — PROGRESS NOTES
Subjective:       Patient ID: Libby Estrada is a 82 y.o. female.    Chief Complaint: hx of tongue cancer and Sore Throat    Libby is here for tongue cancer history. Symptoms have been present for over 1 year. It was removed by an oral surgeon in [facility]. She is here to establish care. She has noted new lesions. No dysphagia. No dysphonia. No bleeding. She has a little tongue tip numbness. No grafting. She does use listerine often.     History   Smoking Status    Never Smoker   Smokeless Tobacco    Never Used     History   Alcohol Use    1.2 oz/week    2 Glasses of wine per week      Pertinent meds:  Previous surgery: tongue cancer removal in the office 1 year ago    Review of Systems   Constitutional: Negative for activity change and appetite change.   Eyes: Negative for discharge.   Respiratory: Negative for difficulty breathing and wheezing   Cardiovascular: Negative for chest pain.   Gastrointestinal: Negative for abdominal distention and abdominal pain.   Endocrine: Negative for cold intolerance and heat intolerance.   Genitourinary: Negative for dysuria.   Musculoskeletal: Negative for gait problem and joint swelling.   Skin: Negative for color change and pallor.   Neurological: Negative for syncope and weakness.   Psychiatric/Behavioral: Negative for agitation and confusion.     Objective:        Constitutional:   She is oriented to person, place, and time. She appears well-developed and well-nourished. She appears alert. She is active. Normal speech.      Head:  Normocephalic and atraumatic. Head is without TMJ tenderness. No scars. Salivary glands normal.  Facial strength is normal.      Ears:    Right Ear: No drainage or swelling. No middle ear effusion.   Left Ear: No drainage or swelling.  No middle ear effusion.     Nose:  No mucosal edema, rhinorrhea or sinus tenderness. No turbinate hypertrophy.      Mouth/Throat  Oropharynx clear and moist without lesions or asymmetry, normal uvula midline and  mirror exam normal. Normal dentition. No uvula swelling, lacerations or trismus. No oropharyngeal exudate. Tonsillar erythema, tonsillar exudate.    Mild leukoplakia of left maxillary gingiva between 1st premolar and 2nd molar. No ulceration or concerning nature    Tongue well healed, no lesions or firmness      Neck:  Full range of motion with neck supple and no adenopathy. Thyroid tenderness is present. No tracheal deviation, no edema, no erythema, normal range of motion, no stridor, no crepitus and no neck rigidity present. No thyroid mass present.     Cardiovascular:   Intact distal pulses and normal pulses.      Pulmonary/Chest:   Effort normal and breath sounds normal. No stridor.     Psychiatric:   Her speech is normal and behavior is normal. Her mood appears not anxious. Her affect is not labile.     Neurological:   She is alert and oriented to person, place, and time. No sensory deficit.     Skin:   No abrasions, lacerations, lesions, or rashes. No abrasion and no bruising noted.         Tests / Results:  Requested outside records.     Assessment:       1. 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 She is doing well from that perspective. I encouraged her to discontinue listerine use as this may be a chronic irritant.   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 This does not appear concerning today but will need to be monitored.  We will request her old records.  Follow-up with me in 3 months

## 2017-09-20 NOTE — Clinical Note
September 20, 2017      DOMINIC Salas MD  [address] [facility] [address]  [facility] [address]           [facility]  [address] [facility] [address] [place] [address]  Phone: [phone]  Fax: [phone]          Patient: Libby Estrada   MR Number: [id number]   YOB: 1935   Date of Visit: 9/20/2017       Dear Dr. DOMINIC Salas:    Thank you for referring Libby Estrada to me for evaluation. Attached you will find relevant portions of my assessment and plan of care.    If you have questions, please do not hesitate to call me. I look forward to following Libby Estrada along with you.    Sincerely,    Tobin Lobato MD    Enclosure  CC:  No Recipients    If you would like to receive this communication electronically, please contact [email or web address] or [phone] to request more information on [employer] Link access.    For providers and/or their staff who would like to refer a patient to [facility], please contact us through our one-stop-shop provider referral line, [facility], at [phone].    If you feel you have received this communication in error or would no longer like to receive these types of communications, please e-mail [email or web address]

## 2017-10-06 ENCOUNTER — OFFICE VISIT (OUTPATIENT)
Dept: OPHTHALMOLOGY | Facility: CLINIC | Age: 82
End: 2017-10-06
Payer: MEDICARE

## 2017-10-06 DIAGNOSIS — Z96.1 PSEUDOPHAKIA: ICD-10-CM

## 2017-10-06 DIAGNOSIS — H43.811 PVD (POSTERIOR VITREOUS DETACHMENT), RIGHT: ICD-10-CM

## 2017-10-06 DIAGNOSIS — H52.7 REFRACTIVE ERROR: ICD-10-CM

## 2017-10-06 DIAGNOSIS — H25.12 NUCLEAR SCLEROSIS, LEFT: ICD-10-CM

## 2017-10-06 DIAGNOSIS — R73.03 BORDERLINE DIABETES: ICD-10-CM

## 2017-10-06 DIAGNOSIS — H26.491 POSTERIOR CAPSULE OPACIFICATION, RIGHT: ICD-10-CM

## 2017-10-06 DIAGNOSIS — H40.053 BORDERLINE GLAUCOMA OF BOTH EYES WITH OCULAR HYPERTENSION: Primary | ICD-10-CM

## 2017-10-06 PROCEDURE — 92012 INTRM OPH EXAM EST PATIENT: CPT | Mod: S$PBB,,, | Performed by: OPHTHALMOLOGY

## 2017-10-06 PROCEDURE — 99212 OFFICE O/P EST SF 10 MIN: CPT | Mod: PBBFAC,PO | Performed by: OPHTHALMOLOGY

## 2017-10-06 PROCEDURE — 99999 PR PBB SHADOW E&M-EST. PATIENT-LVL II: CPT | Mod: PBBFAC,,, | Performed by: OPHTHALMOLOGY

## 2017-10-06 NOTE — PROGRESS NOTES
HPI     4 month IOP check, denies eye pain , Using Cosopt ou BID, Latanoprost ou   HS    Last edited by Nelly Padilla on 10/6/2017  9:33 AM. (History)            Assessment /Plan     For exam results, see Encounter Report.    Borderline glaucoma of both eyes with ocular hypertension    Borderline diabetes    Nuclear sclerosis, left    Posterior capsule opacification, right    Pseudophakia    PVD (posterior vitreous detachment), right    Refractive error             Senile nuclear sclerosis - s/p Phaco/PCIOL OD, moderate PCO, doing well. OS approaching visual significance.   Dilates well, denies flomax.    1. Ocular hypertension - IOP had been creeping up again, despite no back injections for 2 years - doing better with Latanoprost QHS OU and Cosopt BID OD. Clinical exam appears stable. Possible progression on previous HRT but most recent closer to priors - cataract may have been interfering. Recent HVF WNL again. Thick CCT. Continue Cosopt BID OD only, changed Lumigan to Latanoprost QHS OU due to cost. Doing better with more frequent non-preserved artificial tears to help with burning.     Possible slight clinical change in C:D OS - will switch Cosopt to BID OU on 6/9/17.    Possible steroid response component - had been getting steroid shots for back. Last gonio open but abnormal vessel OS (stable). IOP went slightly higher OD after CE on Prednisolone.     Consider blue-yellow HVF in future after CE if white WNL and HRT shows change.    IOP creeping up OS as well - monitor closely - f/u 4 months IOP check.    2.    3. PVD (posterior vitreous detachment) - RD precautions   4. Hyperopia with presbyopia - target emmetropia - toric not recommended based on K's. MRx stable   5. 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 Observe. Not NVA       RTC 4 months - IOP check OU

## 2017-10-09 ENCOUNTER — OFFICE VISIT (OUTPATIENT)
Dept: FAMILY MEDICINE | Facility: CLINIC | Age: 82
End: 2017-10-09
Payer: MEDICARE

## 2017-10-09 ENCOUNTER — HOSPITAL ENCOUNTER (OUTPATIENT)
Dept: RADIOLOGY | Facility: HOSPITAL | Age: 82
Discharge: HOME | End: 2017-10-09
Attending: NURSE PRACTITIONER
Payer: MEDICARE

## 2017-10-09 VITALS
HEIGHT: 65 IN | HEART RATE: 85 BPM | BODY MASS INDEX: 25.27 KG/M2 | SYSTOLIC BLOOD PRESSURE: 125 MMHG | DIASTOLIC BLOOD PRESSURE: 76 MMHG | WEIGHT: 151.69 LBS | OXYGEN SATURATION: 96 % | TEMPERATURE: 98 F

## 2017-10-09 DIAGNOSIS — S63.104A CLOSED DISLOCATION OF RIGHT THUMB, INITIAL ENCOUNTER: Primary | ICD-10-CM

## 2017-10-09 DIAGNOSIS — S63.104A CLOSED DISLOCATION OF RIGHT THUMB, INITIAL ENCOUNTER: ICD-10-CM

## 2017-10-09 DIAGNOSIS — M18.11 DEGENERATIVE ARTHRITIS OF THUMB, RIGHT: ICD-10-CM

## 2017-10-09 PROCEDURE — 73130 X-RAY EXAM OF HAND: CPT | Mod: TC,PO,RT

## 2017-10-09 PROCEDURE — 99213 OFFICE O/P EST LOW 20 MIN: CPT | Mod: S$PBB,,, | Performed by: NURSE PRACTITIONER

## 2017-10-09 PROCEDURE — 73130 X-RAY EXAM OF HAND: CPT | Mod: 26,RT,, | Performed by: RADIOLOGY

## 2017-10-09 PROCEDURE — 99214 OFFICE O/P EST MOD 30 MIN: CPT | Mod: PBBFAC,PO,25 | Performed by: NURSE PRACTITIONER

## 2017-10-09 PROCEDURE — 99999 PR PBB SHADOW E&M-EST. PATIENT-LVL IV: CPT | Mod: PBBFAC,,, | Performed by: NURSE PRACTITIONER

## 2017-10-09 NOTE — PROGRESS NOTES
"Subjective:       Patient ID: Libby Estrada is a 82 y.o. female.    Chief Complaint: Hand Pain (right tumb pops out of place)    Right thumb began popping out "some time last week", popped out with movement. No known trauma to site. Site not swollen or red. Pt bought a hand brace over the counter to keep from moving.     Hand Pain    The incident occurred more than 1 week ago. There was no injury mechanism. The pain is present in the right hand (thumb). The quality of the pain is described as aching. The pain does not radiate. The pain is at a severity of 4/10. The pain is moderate. The pain has been fluctuating since the incident. Associated symptoms include muscle weakness. Pertinent negatives include no chest pain, numbness or tingling. The symptoms are aggravated by movement. She has tried NSAIDs and rest (splint ) for the symptoms.     Vitals:    10/09/17 1335   BP: 125/76   Pulse: 85   Temp: 98 °F (36.7 °C)     Review of Systems   Constitutional: Positive for activity change. Negative for diaphoresis, fatigue and fever.   HENT: Negative.    Eyes: Negative.    Respiratory: Negative.  Negative for cough, shortness of breath and wheezing.    Cardiovascular: Negative.  Negative for chest pain.   Gastrointestinal: Negative.  Negative for abdominal pain, diarrhea and nausea.   Endocrine: Negative.    Genitourinary: Negative.  Negative for dysuria and hematuria.   Musculoskeletal: Positive for arthralgias and myalgias. Negative for joint swelling.   Skin: Negative.  Negative for color change and rash.   Allergic/Immunologic: Negative.    Neurological: Negative.  Negative for tingling, speech difficulty, weakness and numbness.   Hematological: Negative.    Psychiatric/Behavioral: Negative.        Past Medical History:   Diagnosis Date    Arthritis     Cataract     OS    Episode of hypertension 12/28/2012    pt states she does not have, Maybe white coat syndrome    GERD (gastroesophageal reflux disease)     " Glaucoma     suspect    Hyperlipidemia     Ocular hypertension     Spinal stenosis      Objective:      Physical Exam   Constitutional: She is oriented to person, place, and time. She appears well-developed and well-nourished.   HENT:   Head: Normocephalic and atraumatic.   Mouth/Throat: Oropharynx is clear and moist.   Eyes: Conjunctivae and EOM are normal. Pupils are equal, round, and reactive to light.   Neck: Neck supple.   Cardiovascular: Regular rhythm.    Musculoskeletal: She exhibits tenderness.        Right hand: She exhibits decreased range of motion, tenderness and disruption of two-point discrimination. Decreased sensation noted. Decreased strength noted. She exhibits thumb/finger opposition.   Tenderness to right thumb + popping joint with movement    Neurological: She is alert and oriented to person, place, and time.   Skin: Skin is warm and dry.   Psychiatric: She has a normal mood and affect. Her behavior is normal.   Nursing note and vitals reviewed.      Assessment:       1. Closed dislocation of right thumb, initial encounter    2. Degenerative arthritis of thumb, right        Plan:       Closed dislocation of right thumb, initial encounter  -     X-Ray Hand 2 View Right; Future; Expected date: 10/09/2017  -     Ambulatory referral to Orthopedics    Degenerative arthritis of thumb, right  -     Ambulatory referral to Orthopedics          See ortho - will fu if not better

## 2017-10-17 ENCOUNTER — OFFICE VISIT (OUTPATIENT)
Dept: ORTHOPEDICS | Facility: CLINIC | Age: 82
End: 2017-10-17
Payer: MEDICARE

## 2017-10-17 VITALS
BODY MASS INDEX: 25.27 KG/M2 | SYSTOLIC BLOOD PRESSURE: 173 MMHG | DIASTOLIC BLOOD PRESSURE: 85 MMHG | HEIGHT: 65 IN | WEIGHT: 151.69 LBS | HEART RATE: 102 BPM

## 2017-10-17 DIAGNOSIS — M65.311 TRIGGER THUMB OF RIGHT HAND: Primary | ICD-10-CM

## 2017-10-17 DIAGNOSIS — M18.11 ARTHRITIS OF CARPOMETACARPAL (CMC) JOINT OF RIGHT THUMB: ICD-10-CM

## 2017-10-17 DIAGNOSIS — M79.641 PAIN OF RIGHT HAND: ICD-10-CM

## 2017-10-17 PROCEDURE — 99999 PR PBB SHADOW E&M-EST. PATIENT-LVL III: CPT | Mod: PBBFAC,,, | Performed by: ORTHOPAEDIC SURGERY

## 2017-10-17 PROCEDURE — 99213 OFFICE O/P EST LOW 20 MIN: CPT | Mod: PBBFAC,PO | Performed by: ORTHOPAEDIC SURGERY

## 2017-10-17 PROCEDURE — 20550 NJX 1 TENDON SHEATH/LIGAMENT: CPT | Mod: PBBFAC,PO | Performed by: ORTHOPAEDIC SURGERY

## 2017-10-17 PROCEDURE — 99203 OFFICE O/P NEW LOW 30 MIN: CPT | Mod: 25,S$PBB,, | Performed by: ORTHOPAEDIC SURGERY

## 2017-10-17 RX ORDER — TRIAMCINOLONE ACETONIDE 40 MG/ML
40 INJECTION, SUSPENSION INTRA-ARTICULAR; INTRAMUSCULAR
Status: DISCONTINUED | OUTPATIENT
Start: 2017-10-17 | End: 2017-10-17 | Stop reason: HOSPADM

## 2017-10-17 RX ADMIN — TRIAMCINOLONE ACETONIDE 40 MG: 40 INJECTION, SUSPENSION INTRA-ARTICULAR; INTRAMUSCULAR at 02:10

## 2017-10-17 NOTE — PROCEDURES
Tendon Sheath  Date/Time: 10/17/2017 2:52 PM  Performed by: ANA GUEVARA  Authorized by: ANA GUEVARA     Consent Done?:  Yes (Verbal)  Timeout: prior to procedure the correct patient, procedure, and site was verified    Indications:  Pain  Site marked: the procedure site was marked    Timeout: prior to procedure the correct patient, procedure, and site was verified    Location:  Thumb  Site:  R thumb MCP  Prep: patient was prepped and draped in usual sterile fashion    Ultrasonic guidance for needle placement?: No    Needle size:  25 G  Approach:  Volar  Medications:  40 mg triamcinolone acetonide 40 mg/mL  Patient tolerance:  Patient tolerated the procedure well with no immediate complications

## 2017-10-17 NOTE — LETTER
October 17, 2017      Cindy Grace, NP  [address] [facility] [address] [place] [address]           [facility]  [address] [facility] [place] [address]  Phone: [phone]          Patient: Libby Estrada   MR Number: [id number]   YOB: 1935   Date of Visit: 10/17/2017       Dear Cindy Grace:    Thank you for referring Libby Estrada to me for evaluation. Attached you will find relevant portions of my assessment and plan of care.    If you have questions, please do not hesitate to call me. I look forward to following Libby Estrada along with you.    Sincerely,    Raad Wisdom MD    Enclosure  CC:  No Recipients    If you would like to receive this communication electronically, please contact [email or web address] or [phone] to request more information on [employer] Link access.    For providers and/or their staff who would like to refer a patient to [facility], please contact us through our one-stop-shop provider referral line, [facility], at [phone].    If you feel you have received this communication in error or would no longer like to receive these types of communications, please e-mail [email or web address]

## 2017-10-17 NOTE — PROGRESS NOTES
Subjective:          Chief Complaint: Libby Estrada is a 82 y.o. female who had concerns including Pain of the Right Hand.    Ms. Estrada is here for evaluation of her right thumb (she is RHD). Two months history of right thumb and instability. Pain increases with writing and driving.   Advil previously for pain.   She has been using a right thumb brace    Pain: 2/10          Past Medical History:   Diagnosis Date    Arthritis     Cataract     OS    Episode of hypertension 2012    pt states she does not have, Maybe white coat syndrome    GERD (gastroesophageal reflux disease)     Glaucoma     suspect    Hyperlipidemia     Ocular hypertension     Spinal stenosis        Past Surgical History:   Procedure Laterality Date    APPENDECTOMY      CATARACT EXTRACTION Right 7/8/15    Dr Engel    COLONOSCOPY      LUMBAR EPIDURAL INJECTION      MOUTH SURGERY      dental implants       Family History   Problem Relation Age of Onset    Diabetes Mother     Heart attack Mother     Cancer Maternal Aunt      ovarian    Heart attack Father     Arthritis Father     Heart disease Father       at age 68    Heart attack Sister     Heart attack Brother     Amblyopia Neg Hx     Blindness Neg Hx     Cataracts Neg Hx     Hypertension Neg Hx     Macular degeneration Neg Hx     Retinal detachment Neg Hx     Strabismus Neg Hx     Stroke Neg Hx     Thyroid disease Neg Hx     Glaucoma Neg Hx          Current Outpatient Prescriptions:     aspirin (ECOTRIN) 81 MG EC tablet, Take 81 mg by mouth once daily.  , Disp: , Rfl:     dorzolamide-timolol 2-0.5% (COSOPT) 22.3-6.8 mg/mL ophthalmic solution, Place 1 drop into both eyes 2 (two) times daily. To replace Timolol (yellow lid), Disp: 30 mL, Rfl: 3    ibuprofen (ADVIL,MOTRIN) 400 MG tablet, Take 400 mg by mouth every 6 (six) hours as needed for Other. Not sure of mg, Disp: , Rfl:     latanoprost 0.005 % ophthalmic solution, PLACE 1 DROP INTO BOTH EYES  EVERY EVENING., Disp: 8 mL, Rfl: 3    MULTIVITAMIN W-MINERALS/LUTEIN (CENTRUM SILVER ORAL), Take by mouth., Disp: , Rfl:     vitamin D 1000 units Tab, Take 2,000 Units by mouth once daily., Disp: , Rfl:     Review of patient's allergies indicates:   Allergen Reactions    Polysporin [bacitracin-polymyxin b] Rash    Codeine Nausea And Vomiting    Gamma immune glob from whey Other (See Comments)     shakes    Lidocaine Itching    Penicillins Hives    Sulfa (sulfonamide antibiotics) Swelling    Bacitracin Rash       Vitals:    10/17/17 1400   BP: (!) 173/85   Pulse: 102       ROS                Objective:        General: Libby is well-developed, well-nourished, appears stated age, in no acute distress, alert and oriented to time, place and person.     General    Vitals reviewed.  Constitutional: She is oriented to person, place, and time. She appears well-developed and well-nourished. No distress.   HENT:   Head: Normocephalic and atraumatic.   Nose: Nose normal.   Eyes: Pupils are equal, round, and reactive to light.   Cardiovascular: Normal rate.    Pulmonary/Chest: Effort normal.   Neurological: She is alert and oriented to person, place, and time.   Psychiatric: She has a normal mood and affect. Her behavior is normal. Judgment and thought content normal.             Right Hand/Wrist Exam     Inspection   Deformity: Hand -  deformity    Pain   Hand - The patient exhibits pain of the thumb MCP.  Wrist - The patient exhibits pain of the CMC.    Tenderness   The patient is tender to palpation of the dorsal area and fowlre area.    Tests     Atrophy   Thenar:  negative  Hypothenar:  negative  Intrinsic:  negative  1st Dorsal Interosseous: negative    Other     Neuorologic Exam    Median Distribution: normal  Ulnar Distribution: normal  Radial Distribution: normal    Comments:  Active triggering and clicking on exam today; + ttp and nodule over volar right MCP      Left Hand/Wrist Exam     Inspection    Deformity: Hand -  present    Other     Sensory Exam  Median Distribution: normal  Ulnar Distribution: normal  Radial Distribution: normal          Muscle Strength   Right Upper Extremity   Wrist Extension: 5/5/5   Wrist Flexion: 5/5/5   : 5/5/5   Index Finger: 5/5  Middle Finger: 5/5  Ring Finger: 5/5  Little Finger: 5/5  Thumb - APB: 5/5  Thumb - FPL: 5/5  Pinch Mechanism: 5/5    Vascular Exam       Capillary Refill  Right Hand: normal capillary refill  Left Hand: normal capillary refill        Current and previous radiographic studies and results were reviewed with the patient:   Findings:    There is severe degenerative change of the thumb CMC joint and there is degenerative change of the triscaphe joint and interphalangeal joints, most probably affecting the DIP joints of the 4th and 5th fingers.  No erosions.  No chondrocalcinosis.  No radiographically evident acute fracture or osseous destructive process.  There is mild ulnar minus variance. No soft tissue abnormalities.      Assessment:       Encounter Diagnoses   Name Primary?    Pain of right hand Yes    Arthritis of carpometacarpal (CMC) joint of right thumb     Trigger thumb of right hand           Plan:         The patient was counseled today regarding her diagnostic study results and the different treatment options. I spent >50% of the time discussing conservative vs. Operative options with the patient as well as risks and benefits of the different options.  Right thumb injection  F/U PRN  Discussed need for hand and upper extremity surgeon for CMC if becomes more symptomatic

## 2017-12-20 ENCOUNTER — OFFICE VISIT (OUTPATIENT)
Dept: OTOLARYNGOLOGY | Facility: CLINIC | Age: 82
End: 2017-12-20
Payer: MEDICARE

## 2017-12-20 VITALS
WEIGHT: 150.81 LBS | BODY MASS INDEX: 25.75 KG/M2 | HEIGHT: 64 IN | SYSTOLIC BLOOD PRESSURE: 142 MMHG | DIASTOLIC BLOOD PRESSURE: 74 MMHG

## 2017-12-20 DIAGNOSIS — D00.07 SQUAMOUS CELL CARCINOMA IN SITU OF LATERAL TONGUE: Primary | ICD-10-CM

## 2017-12-20 PROCEDURE — 99213 OFFICE O/P EST LOW 20 MIN: CPT | Mod: S$PBB,,, | Performed by: OTOLARYNGOLOGY

## 2017-12-20 PROCEDURE — 99213 OFFICE O/P EST LOW 20 MIN: CPT | Mod: PBBFAC,PO | Performed by: OTOLARYNGOLOGY

## 2017-12-20 PROCEDURE — 99999 PR PBB SHADOW E&M-EST. PATIENT-LVL III: CPT | Mod: PBBFAC,,, | Performed by: OTOLARYNGOLOGY

## 2017-12-20 NOTE — PROGRESS NOTES
Subjective:       Patient ID: Libby Estrada is a 82 y.o. female.    Chief Complaint: Follow-up    Libby is here for tongue cancer history removed by oral surgeon > 1 year ago. Here for surveillance. No issues. No new sores. No pain, no bleeding. Stopped using alcohol based mouth rinse since last visit.     History   Smoking Status    Never Smoker   Smokeless Tobacco    Never Used     History   Alcohol Use    1.2 oz/week    2 Glasses of wine per week      Pertinent meds:  Previous surgery: tongue cancer removal in the office 1 year ago    Review of Systems   Constitutional: Negative for activity change and appetite change.   Eyes: Negative for discharge.   Respiratory: Negative for difficulty breathing and wheezing   Cardiovascular: Negative for chest pain.   Gastrointestinal: Negative for abdominal distention and abdominal pain.   Endocrine: Negative for cold intolerance and heat intolerance.   Genitourinary: Negative for dysuria.   Musculoskeletal: Negative for gait problem and joint swelling.   Skin: Negative for color change and pallor.   Neurological: Negative for syncope and weakness.   Psychiatric/Behavioral: Negative for agitation and confusion.     Objective:        Constitutional:   Vital signs are normal. She appears well-developed and well-nourished.     Ears:  Right ear hearing normal to normal and whispered voice; external ear normal without scars, lesions, or masses; ear canal, tympanic membrane, and middle ear normal. and left ear hearing normal to normal and whispered voice; external ear normal without scars, lesions, or masses; ear canal, tympanic membrane, and middle ear normal..     Mouth/Throat  Oropharynx clear and moist without lesions or asymmetry. No dental abscesses, dental caries or oral lesions. No oropharyngeal exudate.   Stable gingival leukoplakia / parakeratosis along premolar and molars L>R on mandible both lingual and buccal surface. No ulcerative or concerning changes  Tongue  soft with no lesions          Tests / Results:  None    Assessment:       1. 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 No evidence of recurrent disease.  Continue routine follow-up for now, can spread out as long as not having any major issues.  Follow-up with me in 6 months than can consider yearly if doing well.

## 2018-02-05 ENCOUNTER — TELEPHONE (OUTPATIENT)
Dept: OPHTHALMOLOGY | Facility: CLINIC | Age: 83
End: 2018-02-05

## 2018-02-05 NOTE — TELEPHONE ENCOUNTER
----- Message from Sophie Monaco sent at 2/5/2018 12:55 PM CST -----  Contact: self  Patient [phone] (please leave a voicemail message if she can not ) has an appt this Friday 02 09 18 at 9:30am for a 4 month IOP check/she is needing to know if her eyes will be dilated as she can drive herself if they do not need to be dilated or she will need to bring someone with her/please call

## 2018-02-09 ENCOUNTER — OFFICE VISIT (OUTPATIENT)
Dept: OPHTHALMOLOGY | Facility: CLINIC | Age: 83
End: 2018-02-09
Payer: MEDICARE

## 2018-02-09 DIAGNOSIS — H40.89 OTHER GLAUCOMA OF BOTH EYES: ICD-10-CM

## 2018-02-09 DIAGNOSIS — H52.7 REFRACTIVE ERROR: ICD-10-CM

## 2018-02-09 DIAGNOSIS — H43.811 PVD (POSTERIOR VITREOUS DETACHMENT), RIGHT: ICD-10-CM

## 2018-02-09 DIAGNOSIS — H26.491 POSTERIOR CAPSULE OPACIFICATION, RIGHT: ICD-10-CM

## 2018-02-09 DIAGNOSIS — H40.053 BORDERLINE GLAUCOMA OF BOTH EYES WITH OCULAR HYPERTENSION: Primary | ICD-10-CM

## 2018-02-09 DIAGNOSIS — Z96.1 PSEUDOPHAKIA: ICD-10-CM

## 2018-02-09 DIAGNOSIS — R73.03 BORDERLINE DIABETES: ICD-10-CM

## 2018-02-09 DIAGNOSIS — H25.12 NUCLEAR SCLEROSIS, LEFT: ICD-10-CM

## 2018-02-09 PROCEDURE — 99999 PR PBB SHADOW E&M-EST. PATIENT-LVL II: CPT | Mod: PBBFAC,,, | Performed by: OPHTHALMOLOGY

## 2018-02-09 PROCEDURE — 92012 INTRM OPH EXAM EST PATIENT: CPT | Mod: S$PBB,,, | Performed by: OPHTHALMOLOGY

## 2018-02-09 PROCEDURE — 99212 OFFICE O/P EST SF 10 MIN: CPT | Mod: PBBFAC,PO | Performed by: OPHTHALMOLOGY

## 2018-02-09 RX ORDER — LATANOPROST 50 UG/ML
SOLUTION/ DROPS OPHTHALMIC
Qty: 8 ML | Refills: 3 | Status: SHIPPED | OUTPATIENT
Start: 2018-02-09 | End: 2018-10-22 | Stop reason: SDUPTHER

## 2018-02-09 NOTE — PROGRESS NOTES
HPI     4 month IOP check denies eye pain, Using Cosopt OU BID & Latanoprost OU   HS  All gtts as directed.     Last edited by Nelly Padilla on 2/9/2018 10:04 AM. (History)            Assessment /Plan     For exam results, see Encounter Report.    Borderline glaucoma of both eyes with ocular hypertension  -     [facility] - OU - Extended - Both Eyes; Standing  -     [facility] Tom[facility] (HRT) - OU - Both Eyes; Standing    Borderline diabetes    Nuclear sclerosis, left    Posterior capsule opacification, right    Pseudophakia    PVD (posterior vitreous detachment), right    Refractive error    Other glaucoma of both eyes   -     [place] [facility] Tom[facility] (HRT) - OU - Both Eyes; Standing    Other orders  -     latanoprost 0.005 % ophthalmic solution; PLACE 1 DROP INTO BOTH EYES EVERY EVENING.  Dispense: 8 mL; Refill: 3             Senile nuclear sclerosis - s/p Phaco/PCIOL OD, moderate PCO, doing well. OS approaching visual significance - BAT next visit   Dilates well, denies flomax.    1. Ocular hypertension - IOP had been creeping up again, despite no back injections for 2 years - doing better with Latanoprost QHS OU and Cosopt BID OD. Clinical exam appears stable. Possible progression on previous HRT but most recent closer to priors - cataract may have been interfering. Recent HVF WNL again. Thick CCT. Continue Cosopt BID OD only, changed Lumigan to Latanoprost QHS OU due to cost. Doing better with more frequent non-preserved artificial tears to help with burning.     Possible slight clinical change in C:D OS - switched Cosopt to BID OU on 6/9/17.    Possible steroid response component - had been getting steroid shots for back. Last gonio open but abnormal vessel OS (stable). IOP went slightly higher OD after CE on Prednisolone.     Consider blue-yellow HVF in future after CE if white WNL and HRT shows change.    IOP creeping up OS as well - monitor closely - f/u 4 months IOP check with HVF  and HRT.    2.    3. PVD (posterior vitreous detachment) - RD precautions   4. Hyperopia with presbyopia - target emmetropia - toric not recommended based on K's. MRx stable   5. 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 Observe. Not NVA       RTC 4 months - IOP check OU

## 2018-03-26 ENCOUNTER — HOSPITAL ENCOUNTER (OUTPATIENT)
Dept: RADIOLOGY | Facility: HOSPITAL | Age: 83
Discharge: HOME | End: 2018-03-26
Attending: ORTHOPAEDIC SURGERY
Payer: MEDICARE

## 2018-03-26 ENCOUNTER — OFFICE VISIT (OUTPATIENT)
Dept: ORTHOPEDICS | Facility: CLINIC | Age: 83
End: 2018-03-26
Payer: MEDICARE

## 2018-03-26 VITALS — WEIGHT: 150 LBS | HEIGHT: 64 IN | BODY MASS INDEX: 25.61 KG/M2

## 2018-03-26 DIAGNOSIS — M54.17 RADICULITIS, LUMBOSACRAL: ICD-10-CM

## 2018-03-26 DIAGNOSIS — M25.552 PAIN OF LEFT HIP JOINT: Primary | ICD-10-CM

## 2018-03-26 DIAGNOSIS — M47.816 LUMBAR SPONDYLOSIS: Primary | ICD-10-CM

## 2018-03-26 DIAGNOSIS — M25.552 PAIN OF LEFT HIP JOINT: ICD-10-CM

## 2018-03-26 PROCEDURE — 99214 OFFICE O/P EST MOD 30 MIN: CPT | Mod: S$PBB,,, | Performed by: ORTHOPAEDIC SURGERY

## 2018-03-26 PROCEDURE — 73502 X-RAY EXAM HIP UNI 2-3 VIEWS: CPT | Mod: TC,PO,LT

## 2018-03-26 PROCEDURE — 99999 PR PBB SHADOW E&M-EST. PATIENT-LVL II: CPT | Mod: PBBFAC,,, | Performed by: ORTHOPAEDIC SURGERY

## 2018-03-26 PROCEDURE — 99212 OFFICE O/P EST SF 10 MIN: CPT | Mod: PBBFAC,25,PN | Performed by: ORTHOPAEDIC SURGERY

## 2018-03-26 PROCEDURE — 73502 X-RAY EXAM HIP UNI 2-3 VIEWS: CPT | Mod: 26,LT,, | Performed by: RADIOLOGY

## 2018-03-26 NOTE — MEDICAL/APP STUDENT
DATE: 3/26/2018  PATIENT: Libby Estrada  REFERRING MD:  CHIEF COMPLAINT:   Chief Complaint   Patient presents with    Left Hip - Pain       HISTORY:  Libby Estrada is a 83 y.o. female, with a hx of herniated lumbar disc,  who presents for initial evaluation of L hip pain. Pt states a 6 month hx of sharp pain that begins in the posterior aspect of the hip and radiates down her buttock and posterior thigh. States the pain is intermittent and worse with walking or stairs. Pain is alleviated with ibuprofen, rest, and heating. Denies pain in the groin or lateral hip pain. Denies numbness or tingling, saddle anesthesia or incontinence.       PAST MEDICAL/SURGICAL HISTORY:  Past Medical History:   Diagnosis Date    Arthritis     Cataract     OS    Episode of hypertension 12/28/2012    pt states she does not have, Maybe white coat syndrome    GERD (gastroesophageal reflux disease)     Glaucoma     suspect    Hyperlipidemia     Ocular hypertension     Spinal stenosis      Past Surgical History:   Procedure Laterality Date    APPENDECTOMY      CATARACT EXTRACTION Right 7/8/15    Dr Engel    COLONOSCOPY      LUMBAR EPIDURAL INJECTION      MOUTH SURGERY      dental implants       Current Medications:   Current Outpatient Prescriptions:     aspirin (ECOTRIN) 81 MG EC tablet, Take 81 mg by mouth once daily.  , Disp: , Rfl:     dorzolamide-timolol 2-0.5% (COSOPT) 22.3-6.8 mg/mL ophthalmic solution, Place 1 drop into both eyes 2 (two) times daily. To replace Timolol (yellow lid), Disp: 30 mL, Rfl: 3    ibuprofen (ADVIL,MOTRIN) 400 MG tablet, Take 400 mg by mouth every 6 (six) hours as needed for Other. Not sure of mg, Disp: , Rfl:     latanoprost 0.005 % ophthalmic solution, PLACE 1 DROP INTO BOTH EYES EVERY EVENING., Disp: 8 mL, Rfl: 3    MULTIVITAMIN W-MINERALS/LUTEIN (CENTRUM SILVER ORAL), Take by mouth., Disp: , Rfl:     Family History: family history was reviewed and is noncontributory  Social History:  "  Social History     Social History    Marital status:      Spouse name: N/A    Number of children: N/A    Years of education: N/A     Occupational History    Not on file.     Social History Main Topics    Smoking status: Never Smoker    Smokeless tobacco: Never Used    Alcohol use 1.2 oz/week     2 Glasses of wine per week    Drug use: No    Sexual activity: Not on file     Other Topics Concern    Not on file     Social History Narrative    No narrative on file       ROS:  Constitution: Negative for chills, fever, and sweats. Negative for unexplained weight loss.  HENT: Negative for headaches and blurry vision.   Cardiovascular: Negative for chest pain, irregular heartbeat, leg swelling and palpitations.   Respiratory: Negative for cough and shortness of breath.   Gastrointestinal: Negative for abdominal pain, heartburn, nausea and vomiting.   Genitourinary: Negative for bladder incontinence and dysuria.   Musculoskeletal: Negative for systemic arthritis, muscle weakness and myalgias.   Neurological: Negative for numbness.   Psychiatric/Behavioral: Negative for depression.  Endocrine: Negative for polyuria.   Hematologic/Lymphatic: Negative for bleeding disorders.   Skin: Negative for poor wound healing.        PHYSICAL EXAM:  Ht 5' 4" (1.626 m)   Wt 68 kg (150 lb)   LMP  (LMP Unknown)   BMI 25.75 kg/m²   Libby Estrada is a well developed, well nourished female in no acute distress. Physical examination of the left hip and lumbar spine evaluated the following:    Gait and Alignment  Lumbar spine range of motion  Inspection for ecchymosis, swelling, atrophy, or deformity  Tenderness to palpation over the bony and soft tissue structures around the lower back/hip  Inspection for intra-articular and/or bursal effusions  Range of Motion and presence of contractures  Sensation and motor strength to the lower extremity  Pain/pop/click with logrolling or range of motion  Straight leg raise " testing  Vascular exam to include skin temperature/color/capillary refill    Remarkable findings included:  Hip normal to inspection. Full ROM, with pain on IR/ER of hip. No clicking or popping. No tenderness to palpation over bony structures. Strength was 5/5, sensation grossly intact. Straight leg test negative. NVI.        IMAGING:   Xrays of the hips performed today and personally reviewed by me. I agree with the radiologist findings, which include:  degenerative change of both hips. There is bilateral axial joint space narrowing.  There is borderline left protrusio acetabuli.  There is degenerative change of the symphysis pubis.  No radiographically evident acute fracture, dislocation, or osseous destructive process.  No osteonecrosis of the femoral head appreciated radiographically.  There is degenerative change of the sacroiliac joints.  There is a levoscoliotic curvature of the mid lumbar spine and multilevel degenerative change of the mid lower lumbar spine in the form of marginal osteophyte formation, degenerative disc disease, and facet arthropathy.    ASSESSMENT:  L sided sciatica     PLAN:  The nature of the diagnosis, using models and diagrams when appropriate, was explained to the patient in detail. Due to her location and nature of the pain, the etiology is likely from her spine.Treatment option discussed included observation and NSAIDS, medrol dose pack, activity modification and PT. If pain is refractory, then other options discussed included referral to pain management or injections. All questions answered and the patient wishes to continue with observation and NSAIDS. Followup as needed       This note was dictated using voice recognition software. Please excuse any grammatical or typographical errors.

## 2018-03-27 NOTE — PROGRESS NOTES
DATE: 3/26/2018  PATIENT: Libby Estrada  REFERRING MD:  CHIEF COMPLAINT:   Chief Complaint   Patient presents with    Left Hip - Pain         HISTORY:  Libby Estrada is a 83 y.o. female, with a past history of previous spine problems including herniated nucleus pulposus and degenerative disc disease,  who presents for initial evaluation of left hip pain. Pt states a 6 month hx of sharp pain that begins in the posterior aspect of the hip and radiates down her buttock and posterior thigh. States the pain is intermittent and worse with walking or stairs. Pain is alleviated with ibuprofen, rest, and heating. Denies pain in the groin or lateral hip pain. Denies numbness or tingling, saddle anesthesia or incontinence.  Pain is reported at 3/10 today.        PAST MEDICAL/SURGICAL HISTORY:       Past Medical History:   Diagnosis Date    Arthritis      Cataract       OS    Episode of hypertension 12/28/2012     pt states she does not have, Maybe white coat syndrome    GERD (gastroesophageal reflux disease)      Glaucoma       suspect    Hyperlipidemia      Ocular hypertension      Spinal stenosis              Past Surgical History:   Procedure Laterality Date    APPENDECTOMY        CATARACT EXTRACTION Right 7/8/15     Dr Engel    COLONOSCOPY        LUMBAR EPIDURAL INJECTION        MOUTH SURGERY         dental implants         Current Medications:   Current Outpatient Prescriptions:     aspirin (ECOTRIN) 81 MG EC tablet, Take 81 mg by mouth once daily.  , Disp: , Rfl:     dorzolamide-timolol 2-0.5% (COSOPT) 22.3-6.8 mg/mL ophthalmic solution, Place 1 drop into both eyes 2 (two) times daily. To replace Timolol (yellow lid), Disp: 30 mL, Rfl: 3    ibuprofen (ADVIL,MOTRIN) 400 MG tablet, Take 400 mg by mouth every 6 (six) hours as needed for Other. Not sure of mg, Disp: , Rfl:     latanoprost 0.005 % ophthalmic solution, PLACE 1 DROP INTO BOTH EYES EVERY EVENING., Disp: 8 mL, Rfl: 3    MULTIVITAMIN  "W-MINERALS/LUTEIN (CENTRUM SILVER ORAL), Take by mouth., Disp: , Rfl:      Family History: family history was reviewed and is noncontributory  Social History:   Social History   Social History            Social History    Marital status:        Spouse name: N/A    Number of children: N/A    Years of education: N/A          Occupational History    Not on file.            Social History Main Topics    Smoking status: Never Smoker    Smokeless tobacco: Never Used    Alcohol use 1.2 oz/week       2 Glasses of wine per week    Drug use: No    Sexual activity: Not on file           Other Topics Concern    Not on file          Social History Narrative    No narrative on file            ROS:  Constitution: Negative for chills, fever, and sweats. Negative for unexplained weight loss.  HENT: Negative for headaches and blurry vision.   Cardiovascular: Negative for chest pain, irregular heartbeat, leg swelling and palpitations.   Respiratory: Negative for cough and shortness of breath.   Gastrointestinal: Negative for abdominal pain, heartburn, nausea and vomiting.   Genitourinary: Negative for bladder incontinence and dysuria.   Musculoskeletal: Negative for systemic arthritis, muscle weakness and myalgias.   Neurological: Negative for numbness.   Psychiatric/Behavioral: Negative for depression.  Endocrine: Negative for polyuria.   Hematologic/Lymphatic: Negative for bleeding disorders.   Skin: Negative for poor wound healing.          PHYSICAL EXAM:  Ht 5' 4" (1.626 m)   Wt 68 kg (150 lb)   LMP  (LMP Unknown)   BMI 25.75 kg/m²   Libby Estrada is a well developed, well nourished elderly female in no acute distress. Physical examination of the left hip and lumbar spine evaluated the following:     Gait and Alignment  Lumbar spine range of motion  Inspection for ecchymosis, swelling, atrophy, or deformity  Tenderness to palpation over the bony and soft tissue structures around the lower back/hip  Inspection " for intra-articular and/or bursal effusions  Range of Motion and presence of contractures  Sensation and motor strength to the lower extremity  Pain/pop/click with logrolling or range of motion  Straight leg raise testing  Vascular exam to include skin temperature/color/capillary refill     Remarkable findings included:  Hip normal to inspection. Full ROM, with minimal pain on flexion and internal rotation of hip. No clicking or popping. No tenderness to palpation over bony structures. Strength was 5/5 in both lower extremities, sensation grossly intact L2-S1. Straight leg test negative. NVI.           IMAGING:   Xrays of the hips performed today and personally reviewed by me which include:   mild to early moderate degenerative change of both hips. There is mild joint space narrowing.  There is borderline left protrusio acetabuli.  There is degenerative change of the symphysis pubis.  No radiographically evident acute fracture, dislocation, or osseous destructive process.  No osteonecrosis of the femoral head appreciated radiographically. There is a levoscoliotic curvature of the mid lumbar spine and multilevel degenerative change of the mid lower lumbar spine in the form of marginal osteophyte formation, degenerative disc disease, and facet arthropathy.     ASSESSMENT:  Lumbar spondylosis with left lower extremity radiculitis  Osteoarthrosis left hip     PLAN:  The nature of the diagnosis, using models and diagrams when appropriate, was explained to the patient in detail. Due to her location and nature of the pain, I have explained that her pain is most likely referred from the lumbar spine rather than primary hip pathology. Treatment option discussed included observation and NSAIDS, medrol dose pack, activity modification and PT. If pain persists with these conservative measures, then other options discussed included referral to pain management for referral for epidural steroid injection. All questions answered  and the patient wishes to continue with observation and over-the-counter NSAIDS.  Follow-up if not improving or worse     This note was dictated using voice recognition software. Please excuse any grammatical or typographical errors.

## 2018-04-23 ENCOUNTER — TELEPHONE (OUTPATIENT)
Dept: OPHTHALMOLOGY | Facility: CLINIC | Age: 83
End: 2018-04-23

## 2018-04-23 RX ORDER — DORZOLAMIDE HYDROCHLORIDE AND TIMOLOL MALEATE 20; 5 MG/ML; MG/ML
1 SOLUTION/ DROPS OPHTHALMIC 2 TIMES DAILY
Qty: 30 ML | Refills: 3 | Status: SHIPPED | OUTPATIENT
Start: 2018-04-23 | End: 2018-05-10 | Stop reason: SDUPTHER

## 2018-05-10 RX ORDER — DORZOLAMIDE HYDROCHLORIDE AND TIMOLOL MALEATE 20; 5 MG/ML; MG/ML
1 SOLUTION/ DROPS OPHTHALMIC 2 TIMES DAILY
Qty: 30 ML | Refills: 3 | Status: SHIPPED | OUTPATIENT
Start: 2018-05-10 | End: 2018-10-22 | Stop reason: SDUPTHER

## 2018-05-11 NOTE — TELEPHONE ENCOUNTER
Left message to inform pt that Dr Engel sent in refill for Dorzolamide-timolol to the [facility] in [facility].

## 2018-06-22 ENCOUNTER — OFFICE VISIT (OUTPATIENT)
Dept: OPHTHALMOLOGY | Facility: CLINIC | Age: 83
End: 2018-06-22
Payer: MEDICARE

## 2018-06-22 ENCOUNTER — CLINICAL SUPPORT (OUTPATIENT)
Dept: OPHTHALMOLOGY | Facility: CLINIC | Age: 83
End: 2018-06-22
Payer: MEDICARE

## 2018-06-22 DIAGNOSIS — R73.03 BORDERLINE DIABETES: Primary | ICD-10-CM

## 2018-06-22 DIAGNOSIS — H52.7 REFRACTIVE ERROR: ICD-10-CM

## 2018-06-22 DIAGNOSIS — H40.89 OTHER GLAUCOMA OF BOTH EYES: ICD-10-CM

## 2018-06-22 DIAGNOSIS — H25.12 NUCLEAR SCLEROSIS, LEFT: ICD-10-CM

## 2018-06-22 DIAGNOSIS — H40.053 BORDERLINE GLAUCOMA OF BOTH EYES WITH OCULAR HYPERTENSION: ICD-10-CM

## 2018-06-22 DIAGNOSIS — Z96.1 PSEUDOPHAKIA: ICD-10-CM

## 2018-06-22 DIAGNOSIS — H26.491 POSTERIOR CAPSULE OPACIFICATION, RIGHT: ICD-10-CM

## 2018-06-22 DIAGNOSIS — H43.811 PVD (POSTERIOR VITREOUS DETACHMENT), RIGHT: ICD-10-CM

## 2018-06-22 PROCEDURE — 99212 OFFICE O/P EST SF 10 MIN: CPT | Mod: PBBFAC,PO | Performed by: OPHTHALMOLOGY

## 2018-06-22 PROCEDURE — 92083 EXTENDED VISUAL FIELD XM: CPT | Mod: PBBFAC,PO | Performed by: OPHTHALMOLOGY

## 2018-06-22 PROCEDURE — 99999 PR PBB SHADOW E&M-EST. PATIENT-LVL II: CPT | Mod: PBBFAC,,, | Performed by: OPHTHALMOLOGY

## 2018-06-22 PROCEDURE — 92014 COMPRE OPH EXAM EST PT 1/>: CPT | Mod: S$PBB,,, | Performed by: OPHTHALMOLOGY

## 2018-06-22 PROCEDURE — 92133 CPTRZD OPH DX IMG PST SGM ON: CPT | Mod: PBBFAC,PO | Performed by: OPHTHALMOLOGY

## 2018-06-22 RX ORDER — GABAPENTIN 300 MG/1
300 CAPSULE ORAL 3 TIMES DAILY
Refills: 2 | COMMUNITY
Start: 2018-06-03 | End: 2021-03-03 | Stop reason: SDUPTHER

## 2018-06-22 NOTE — PROGRESS NOTES
HPI     Glaucoma    Additional comments: 4 month iop ck with reviews           Comments   DLS: 2/9/18    Pt states no changes in va since last visit. Does ok without glasses at   dist but needs to read.     Gtts: Latanoprost QHS, Dorzolamide-timolol BID OU    Agree with above. Denies use of steroids including OTC.        Last edited by Edelmira Engel MD on 6/22/2018 12:11 PM.   (History)        ROS     Positive for: Gastrointestinal (GERD - now resolved off meds),   Musculoskeletal (back pain, worse in am when she gets up), Cardiovascular   (treated for cholesterol, BP ok), Eyes (ocular HTN), Heme/Lymph (ASA)    Negative for: Neurological (denies seizure/tremor/restless legs),   Genitourinary (denies flomax), Endocrine (no DM or thyroid problems),   Respiratory (denies SOB/asthma/orthopnea)    Last edited by Edelmira Engel MD on 6/22/2018 12:10 PM.   (History)        Assessment /Plan     For exam results, see Encounter Report.    Borderline diabetes    Borderline glaucoma of both eyes with ocular hypertension  -     Garcia Visual Field - OU - Extended - Both Eyes  -     [place] Retina Tomography (HRT) - OU - Both Eyes    Other glaucoma of both eyes   -     [place] Retina Tomography (HRT) - OU - Both Eyes    Nuclear sclerosis, left    Posterior capsule opacification, right    Pseudophakia    PVD (posterior vitreous detachment), right    Refractive error             Senile nuclear sclerosis - s/p Phaco/PCIOL OD, moderate PCO, doing well. OS approaching visual significance - BAT stable   Dilates well, denies flomax.    1. Ocular hypertension - IOP up to 20 OU today, despite taking gtts this am. Had been creeping up again, despite no back injections for 2 years - doing better with Latanoprost QHS OU and Cosopt BID OD. Clinical exam appears stable. HRT fluctuates, today's within range of priors. Today's HVF with random misses OU, no particular glaucoma pattern. Thick CCT. Continue Cosopt BID  OD only, changed Lumigan to Latanoprost QHS OU due to cost. Doing better with more frequent non-preserved artificial tears to help with burning.     Possible slight clinical change in C:D OS - switched Cosopt to BID OU on 6/9/17.    Possible steroid response component - had been getting steroid shots for back. Last gonio open but abnormal vessel OS (stable). IOP went slightly higher OD after CE on Prednisolone.     Consider blue-yellow HVF in future after CE if white WNL and HRT shows change.    IOP creeping up OS as well - monitor closely - f/u 4 months IOP check with OCT nerve.    2.    3. PVD (posterior vitreous detachment) - RD precautions   4. Hyperopia with presbyopia - target emmetropia - toric not recommended based on K's. MRx stable   5. 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 Observe. Not NVA       RTC 4 months - IOP check OU with OCT nerve.

## 2018-07-09 ENCOUNTER — TELEPHONE (OUTPATIENT)
Dept: OPHTHALMOLOGY | Facility: CLINIC | Age: 83
End: 2018-07-09

## 2018-07-09 NOTE — TELEPHONE ENCOUNTER
----- Message from Francis Francois Jr., MD sent at 7/9/2018  9:12 AM CDT -----      ----- Message -----  From: Viridiana Martines  Sent: 7/3/2018   9:52 AM  To: Francis Francois Jr., MD    Type: Needs Medical Advice    Who Called:  Sujey George Call Back Number: [phone]  Additional Information: Patient needs to reschedule her appointment from 10/22/18 to an appointment with your office. She needs to have an OCT with pressure check. Please call to schedule this appointment.

## 2018-08-08 ENCOUNTER — OFFICE VISIT (OUTPATIENT)
Dept: FAMILY MEDICINE | Facility: CLINIC | Age: 83
End: 2018-08-08
Payer: MEDICARE

## 2018-08-08 VITALS
RESPIRATION RATE: 18 BRPM | SYSTOLIC BLOOD PRESSURE: 125 MMHG | BODY MASS INDEX: 26.76 KG/M2 | WEIGHT: 156.75 LBS | HEART RATE: 95 BPM | HEIGHT: 64 IN | TEMPERATURE: 99 F | DIASTOLIC BLOOD PRESSURE: 80 MMHG | OXYGEN SATURATION: 95 %

## 2018-08-08 DIAGNOSIS — E78.2 MIXED HYPERLIPIDEMIA: ICD-10-CM

## 2018-08-08 DIAGNOSIS — R11.0 NAUSEA: Primary | ICD-10-CM

## 2018-08-08 DIAGNOSIS — E55.9 VITAMIN D DEFICIENCY: ICD-10-CM

## 2018-08-08 DIAGNOSIS — R73.03 PREDIABETES: ICD-10-CM

## 2018-08-08 PROCEDURE — 99214 OFFICE O/P EST MOD 30 MIN: CPT | Mod: PBBFAC,PO | Performed by: NURSE PRACTITIONER

## 2018-08-08 PROCEDURE — 99214 OFFICE O/P EST MOD 30 MIN: CPT | Mod: S$PBB,,, | Performed by: NURSE PRACTITIONER

## 2018-08-08 PROCEDURE — 99999 PR PBB SHADOW E&M-EST. PATIENT-LVL IV: CPT | Mod: PBBFAC,,, | Performed by: NURSE PRACTITIONER

## 2018-08-08 NOTE — PROGRESS NOTES
"Subjective:       Patient ID: Libby Estrada is a 83 y.o. female.    Chief Complaint: Follow-up (nausea, hypertension, and SOB)  She was last seen in primary care by STAN Grace on 10/09/2017. She last saw Josue on 09/14/2017. This is her first time seeing me in the clinic.   HPI   She is complaining of nausea sometimes in the morning. The nausea subsides when she eats a few crackers.  She was concerned that her blood pressure was slightly elevated at home but her home cuff is "pretty old".  Vitals:    08/08/18 1438   BP: 125/80   Pulse: 95   Resp: 18   Temp: 98.6 °F (37 °C)     BP Readings from Last 3 Encounters:   08/08/18 125/80   12/20/17 (!) 142/74   10/17/17 (!) 173/85     Review of Systems    Lab Results   Component Value Date    HGBA1C 5.8 (H) 09/20/2017     CMP  Sodium   Date Value Ref Range Status   09/20/2017 139 136 - 145 mmol/L Final   06/25/2015 142 137 - 145 MMOL/L Final     Potassium   Date Value Ref Range Status   09/20/2017 4.8 3.5 - 5.1 mmol/L Final   06/25/2015 4.3 3.5 - 5.1 MMOL/L Final     Chloride   Date Value Ref Range Status   09/20/2017 102 95 - 110 mmol/L Final   06/25/2015 103 98 - 107 MMOL/L Final     CO2   Date Value Ref Range Status   09/20/2017 30 (H) 23 - 29 mmol/L Final     Glucose   Date Value Ref Range Status   09/20/2017 116 (H) 70 - 110 mg/dL Final     BUN, Bld   Date Value Ref Range Status   09/20/2017 12 8 - 23 mg/dL Final     Creatinine   Date Value Ref Range Status   09/20/2017 0.9 0.5 - 1.4 mg/dL Final   06/25/2015 0.86 0.52 - 1.04 MG/DL Final     Calcium   Date Value Ref Range Status   09/20/2017 9.9 8.7 - 10.5 mg/dL Final     Total Protein   Date Value Ref Range Status   09/20/2017 7.9 6.0 - 8.4 g/dL Final     Albumin   Date Value Ref Range Status   09/20/2017 3.8 3.5 - 5.2 g/dL Final   06/25/2015 3.9 3.5 - 5.0 G/DL Final     Total Bilirubin   Date Value Ref Range Status   09/20/2017 0.4 0.1 - 1.0 mg/dL Final     Comment:     For infants and newborns, interpretation " of results should be based  on gestational age, weight and in agreement with clinical  observations.  Premature Infant recommended reference ranges:  Up to 24 hours.............<8.0 mg/dL  Up to 48 hours............<12.0 mg/dL  3-5 days..................<15.0 mg/dL  6-29 days.................<15.0 mg/dL       Alkaline Phosphatase   Date Value Ref Range Status   09/20/2017 61 55 - 135 U/L Final     AST   Date Value Ref Range Status   09/20/2017 16 10 - 40 U/L Final     ALT   Date Value Ref Range Status   09/20/2017 12 10 - 44 U/L Final     Anion Gap   Date Value Ref Range Status   09/20/2017 7 (L) 8 - 16 mmol/L Final     eGFR if    Date Value Ref Range Status   09/20/2017 >60.0 >60 mL/min/1.73 m^2 Final     eGFR if non    Date Value Ref Range Status   09/20/2017 >60.0 >60 mL/min/1.73 m^2 Final     Comment:     Calculation used to obtain the estimated glomerular filtration  rate (eGFR) is the CKD-EPI equation. Since race is unknown   in our information system, the eGFR values for   -American and Non--American patients are given   for each creatinine result.         Objective:      Physical Exam   Constitutional: She is oriented to person, place, and time. Vital signs are normal. She appears well-developed and well-nourished. She is active and cooperative.   HENT:   Head: Normocephalic and atraumatic.   Right Ear: Hearing, tympanic membrane, external ear and ear canal normal.   Left Ear: Hearing, tympanic membrane, external ear and ear canal normal.   Nose: Nose normal.   Mouth/Throat: Uvula is midline, oropharynx is clear and moist and mucous membranes are normal.   Eyes: Lids are normal.   Neck: Trachea normal, normal range of motion, full passive range of motion without pain and phonation normal. Neck supple.   Cardiovascular: Normal rate, regular rhythm and normal heart sounds.    Pulmonary/Chest: Effort normal and breath sounds normal.   Abdominal: Soft. Bowel sounds  are normal. There is no splenomegaly or hepatomegaly. There is no tenderness.   Musculoskeletal: Normal range of motion.   Lymphadenopathy:        Head (right side): No submental, no submandibular, no tonsillar, no preauricular, no posterior auricular and no occipital adenopathy present.        Head (left side): No submental, no submandibular, no tonsillar, no preauricular, no posterior auricular and no occipital adenopathy present.     She has no cervical adenopathy.   Neurological: She is alert and oriented to person, place, and time.   Skin: Skin is warm, dry and intact.   Psychiatric: She has a normal mood and affect. Her speech is normal and behavior is normal. Judgment and thought content normal. Cognition and memory are normal.   Nursing note and vitals reviewed.      Assessment & Plan:       Nausea  -     CBC auto differential; Future; Expected date: 08/08/2018    Prediabetes  -     Hemoglobin A1c; Future; Expected date: 08/08/2018  -     Comprehensive metabolic panel; Future; Expected date: 08/08/2018  -     TSH; Future; Expected date: 08/08/2018  -     CBC auto differential; Future; Expected date: 08/08/2018    Mixed hyperlipidemia  -     Lipid panel; Future; Expected date: 08/08/2018  -     Comprehensive metabolic panel; Future; Expected date: 08/08/2018  -     TSH; Future; Expected date: 08/08/2018    Vitamin D deficiency  -     Vitamin D; Future; Expected date: 08/08/2018    She states the nausea is not bad enough at this time to use any medications      Follow-up if symptoms worsen or fail to improve.

## 2018-08-11 ENCOUNTER — LAB VISIT (OUTPATIENT)
Dept: LAB | Facility: HOSPITAL | Age: 83
End: 2018-08-11
Attending: FAMILY MEDICINE
Payer: MEDICARE

## 2018-08-11 DIAGNOSIS — E78.2 MIXED HYPERLIPIDEMIA: ICD-10-CM

## 2018-08-11 DIAGNOSIS — R73.03 PREDIABETES: ICD-10-CM

## 2018-08-11 DIAGNOSIS — R11.0 NAUSEA: ICD-10-CM

## 2018-08-11 DIAGNOSIS — E55.9 VITAMIN D DEFICIENCY: ICD-10-CM

## 2018-08-11 LAB
25(OH)D3+25(OH)D2 SERPL-MCNC: 22 NG/ML
ALBUMIN SERPL BCP-MCNC: 3.8 G/DL
ALP SERPL-CCNC: 67 U/L
ALT SERPL W/O P-5'-P-CCNC: 12 U/L
ANION GAP SERPL CALC-SCNC: 9 MMOL/L
AST SERPL-CCNC: 16 U/L
BASOPHILS # BLD AUTO: 0.06 K/UL
BASOPHILS NFR BLD: 1 %
BILIRUB SERPL-MCNC: 0.4 MG/DL
BUN SERPL-MCNC: 11 MG/DL
CALCIUM SERPL-MCNC: 9.7 MG/DL
CHLORIDE SERPL-SCNC: 104 MMOL/L
CHOLEST SERPL-MCNC: 270 MG/DL
CHOLEST/HDLC SERPL: 4 {RATIO}
CO2 SERPL-SCNC: 26 MMOL/L
CREAT SERPL-MCNC: 0.8 MG/DL
DIFFERENTIAL METHOD: ABNORMAL
EOSINOPHIL # BLD AUTO: 0.2 K/UL
EOSINOPHIL NFR BLD: 3.4 %
ERYTHROCYTE [DISTWIDTH] IN BLOOD BY AUTOMATED COUNT: 13.2 %
EST. GFR  (AFRICAN AMERICAN): >60 ML/MIN/1.73 M^2
EST. GFR  (NON AFRICAN AMERICAN): >60 ML/MIN/1.73 M^2
ESTIMATED AVG GLUCOSE: 117 MG/DL
GLUCOSE SERPL-MCNC: 110 MG/DL
HBA1C MFR BLD HPLC: 5.7 %
HCT VFR BLD AUTO: 39.2 %
HDLC SERPL-MCNC: 68 MG/DL
HDLC SERPL: 25.2 %
HGB BLD-MCNC: 12.3 G/DL
IMM GRANULOCYTES # BLD AUTO: 0.02 K/UL
IMM GRANULOCYTES NFR BLD AUTO: 0.3 %
LDLC SERPL CALC-MCNC: 174.6 MG/DL
LYMPHOCYTES # BLD AUTO: 1.4 K/UL
LYMPHOCYTES NFR BLD: 23.7 %
MCH RBC QN AUTO: 28.8 PG
MCHC RBC AUTO-ENTMCNC: 31.4 G/DL
MCV RBC AUTO: 92 FL
MONOCYTES # BLD AUTO: 0.5 K/UL
MONOCYTES NFR BLD: 8.4 %
NEUTROPHILS # BLD AUTO: 3.8 K/UL
NEUTROPHILS NFR BLD: 63.2 %
NONHDLC SERPL-MCNC: 202 MG/DL
NRBC BLD-RTO: 0 /100 WBC
PLATELET # BLD AUTO: 266 K/UL
PMV BLD AUTO: 10 FL
POTASSIUM SERPL-SCNC: 4.4 MMOL/L
PROT SERPL-MCNC: 7.7 G/DL
RBC # BLD AUTO: 4.27 M/UL
SODIUM SERPL-SCNC: 139 MMOL/L
TRIGL SERPL-MCNC: 137 MG/DL
TSH SERPL DL<=0.005 MIU/L-ACNC: 1.46 UIU/ML
WBC # BLD AUTO: 5.96 K/UL

## 2018-08-11 PROCEDURE — 85025 COMPLETE CBC W/AUTO DIFF WBC: CPT

## 2018-08-11 PROCEDURE — 36415 COLL VENOUS BLD VENIPUNCTURE: CPT | Mod: PO

## 2018-08-11 PROCEDURE — 84443 ASSAY THYROID STIM HORMONE: CPT

## 2018-08-11 PROCEDURE — 83036 HEMOGLOBIN GLYCOSYLATED A1C: CPT

## 2018-08-11 PROCEDURE — 82306 VITAMIN D 25 HYDROXY: CPT

## 2018-08-11 PROCEDURE — 80053 COMPREHEN METABOLIC PANEL: CPT

## 2018-08-11 PROCEDURE — 80061 LIPID PANEL: CPT

## 2018-09-07 ENCOUNTER — OFFICE VISIT (OUTPATIENT)
Dept: FAMILY MEDICINE | Facility: CLINIC | Age: 83
End: 2018-09-07
Payer: MEDICARE

## 2018-09-07 VITALS
OXYGEN SATURATION: 96 % | TEMPERATURE: 99 F | WEIGHT: 155.19 LBS | DIASTOLIC BLOOD PRESSURE: 81 MMHG | BODY MASS INDEX: 26.49 KG/M2 | RESPIRATION RATE: 18 BRPM | HEART RATE: 79 BPM | HEIGHT: 64 IN | SYSTOLIC BLOOD PRESSURE: 130 MMHG

## 2018-09-07 DIAGNOSIS — E55.9 VITAMIN D DEFICIENCY: ICD-10-CM

## 2018-09-07 DIAGNOSIS — E78.00 PURE HYPERCHOLESTEROLEMIA: ICD-10-CM

## 2018-09-07 DIAGNOSIS — R11.0 NAUSEA: Primary | ICD-10-CM

## 2018-09-07 DIAGNOSIS — R73.03 PREDIABETES: ICD-10-CM

## 2018-09-07 PROCEDURE — 99215 OFFICE O/P EST HI 40 MIN: CPT | Mod: PBBFAC,PO | Performed by: NURSE PRACTITIONER

## 2018-09-07 PROCEDURE — 99213 OFFICE O/P EST LOW 20 MIN: CPT | Mod: S$PBB,,, | Performed by: NURSE PRACTITIONER

## 2018-09-07 PROCEDURE — 99999 PR PBB SHADOW E&M-EST. PATIENT-LVL V: CPT | Mod: PBBFAC,,, | Performed by: NURSE PRACTITIONER

## 2018-09-07 PROCEDURE — 90662 IIV NO PRSV INCREASED AG IM: CPT | Mod: PBBFAC,PO

## 2018-09-07 RX ORDER — CHOLECALCIFEROL (VITAMIN D3) 25 MCG
1000 TABLET ORAL DAILY
COMMUNITY
Start: 2018-09-07

## 2018-09-07 NOTE — PATIENT INSTRUCTIONS
Blood pressure readings at home 2-3 times weekly      Discharge Instructions: Taking Your Blood Pressure  Blood pressure is the force of blood as it moves from the heart through the blood vessels. You can take your own blood pressure reading using a digital monitor. Take readings as often as your healthcare provider instructs. Take your readings each time in the same way, using the same arm. Here are guidelines for taking your blood pressure.  The American Heart Association (AHA) recommends purchasing a blood pressure monitor that is validated and approved by the Association for the Advancement of Medical Instrumentation, the [place] Hypertension Society, and the International Protocol for the Validation of Automated BP Measuring Devices. If the blood pressure monitor is for a senior adult, a pregnant woman, or a child, make certain it is validated for use with such a population. For the most reliable readings, the AHA recommends an automatic, cuff-style, upper arm (bicep) monitor. The readings from finger and wrist monitors are not as reliable as the upper arm monitor.        Step 1. Relax    · Wait at least a half hour after smoking, eating, or exercising. Do not drink coffee, tea, soda, or other caffeinated beverages before checking your blood pressure.   · Sit comfortably at a table. Place the monitor near you.  · Rest for a few minutes before you begin.        Step 2. Wrap the cuff    · Place your arm on the table, palm up. Put your arm in a position that is level with your heart. Wrap the cuff around your upper arm, about an inch above your elbow. Its best to wrap the cuff on bare skin, not over clothing.  · Make sure your cuff fits. If it doesnt wrap around your upper arm, order a larger cuff. A cuff that is too large or too small can result in an inaccurate blood pressure reading.           Step 3. Inflate the cuff    · Pump the cuff until the scale reads 200. If you have a self-inflating cuff, push the  button that starts the pump.  · The cuff will tighten, then loosen.  · The numbers will change. When they stop changing, your blood pressure reading will appear.  · If you get a reading that is too high or too low for you, relax for a few minutes. Then do the test again.    Step 4. Write down the results  · Write down your blood pressure numbers. Bj the date and time. Keep your results in one place, such as a notebook.  · Remove the cuff from your arm. Turn off the machine.  · Take the readings with you to your medical appointments.  · If you start a new blood pressure medicine, or change a blood pressure medicine dose, note the day you started the new drug or dosage on your blood pressure recording sheet. This will help your healthcare provider monitor the effect of medication changes.     Date Last Reviewed: 4/27/2016  © 8859-0687 [employer]. [address] [place] [address], [place] [address]. All rights reserved. This information is not intended as a substitute for professional medical care. Always follow your healthcare professional's instructions.

## 2018-09-07 NOTE — PROGRESS NOTES
Subjective:       Patient ID: Libby Estrada is a 83 y.o. female.    Chief Complaint: Follow-up (nausea is better)  She was last seen in primary care by me on 08/08/2018. She last saw Josue on 09/14/2017.   HPI   She states the nausea has subsided and home blood pressures are improved and she states feels much better.   Vitals:    09/07/18 1029   BP: 130/81   Pulse:    Resp:    Temp:      BP Readings from Last 3 Encounters:   09/07/18 130/81   08/08/18 125/80   12/20/17 (!) 142/74     Review of Systems    Lab Results   Component Value Date    HGBA1C 5.7 (H) 08/11/2018     CMP  Sodium   Date Value Ref Range Status   08/11/2018 139 136 - 145 mmol/L Final   06/25/2015 142 137 - 145 MMOL/L Final     Potassium   Date Value Ref Range Status   08/11/2018 4.4 3.5 - 5.1 mmol/L Final   06/25/2015 4.3 3.5 - 5.1 MMOL/L Final     Chloride   Date Value Ref Range Status   08/11/2018 104 95 - 110 mmol/L Final   06/25/2015 103 98 - 107 MMOL/L Final     CO2   Date Value Ref Range Status   08/11/2018 26 23 - 29 mmol/L Final     Glucose   Date Value Ref Range Status   08/11/2018 110 70 - 110 mg/dL Final     BUN, Bld   Date Value Ref Range Status   08/11/2018 11 8 - 23 mg/dL Final     Creatinine   Date Value Ref Range Status   08/11/2018 0.8 0.5 - 1.4 mg/dL Final   06/25/2015 0.86 0.52 - 1.04 MG/DL Final     Calcium   Date Value Ref Range Status   08/11/2018 9.7 8.7 - 10.5 mg/dL Final     Total Protein   Date Value Ref Range Status   08/11/2018 7.7 6.0 - 8.4 g/dL Final     Albumin   Date Value Ref Range Status   08/11/2018 3.8 3.5 - 5.2 g/dL Final   06/25/2015 3.9 3.5 - 5.0 G/DL Final     Total Bilirubin   Date Value Ref Range Status   08/11/2018 0.4 0.1 - 1.0 mg/dL Final     Comment:     For infants and newborns, interpretation of results should be based  on gestational age, weight and in agreement with clinical  observations.  Premature Infant recommended reference ranges:  Up to 24 hours.............<8.0 mg/dL  Up to 48  hours............<12.0 mg/dL  3-5 days..................<15.0 mg/dL  6-29 days.................<15.0 mg/dL       Alkaline Phosphatase   Date Value Ref Range Status   08/11/2018 67 55 - 135 U/L Final     AST   Date Value Ref Range Status   08/11/2018 16 10 - 40 U/L Final     ALT   Date Value Ref Range Status   08/11/2018 12 10 - 44 U/L Final     Anion Gap   Date Value Ref Range Status   08/11/2018 9 8 - 16 mmol/L Final     eGFR if    Date Value Ref Range Status   08/11/2018 >60.0 >60 mL/min/1.73 m^2 Final     eGFR if non    Date Value Ref Range Status   08/11/2018 >60.0 >60 mL/min/1.73 m^2 Final     Comment:     Calculation used to obtain the estimated glomerular filtration  rate (eGFR) is the CKD-EPI equation.        Objective:      Physical Exam   Constitutional: She is oriented to person, place, and time. Vital signs are normal. She appears well-developed and well-nourished. She is active and cooperative.   HENT:   Head: Normocephalic and atraumatic.   Nose: Nose normal.   Neck: Trachea normal, normal range of motion, full passive range of motion without pain and phonation normal. Neck supple.   Cardiovascular: Normal rate, regular rhythm and normal heart sounds.   Pulmonary/Chest: Effort normal and breath sounds normal.   Abdominal: Soft. Bowel sounds are normal. There is no tenderness.   Lymphadenopathy:        Head (right side): No submental, no submandibular, no tonsillar, no preauricular, no posterior auricular and no occipital adenopathy present.        Head (left side): No submental, no submandibular, no tonsillar, no preauricular, no posterior auricular and no occipital adenopathy present.     She has no cervical adenopathy.   Neurological: She is alert and oriented to person, place, and time.   Skin: Skin is warm, dry and intact.   Psychiatric: She has a normal mood and affect. Her speech is normal and behavior is normal. Judgment and thought content normal. Cognition and  memory are normal.   Nursing note and vitals reviewed.      Assessment & Plan:       Nausea- Subsided    Prediabetes-HGBA1C improved decreased to 5.7 from 5.8    Pure hypercholesterolemia-     Vitamin D deficiency- Continue OTC D3 at 1,000 IU daily    Other orders  -     Influenza - High Dose (65+) (PF) (IM)       Medication List           Accurate as of 9/7/18  2:33 PM. If you have any questions, ask your nurse or doctor.               CONTINUE taking these medications    aspirin 81 MG EC tablet  Commonly known as:  ECOTRIN     CENTRUM SILVER ORAL     dorzolamide-timolol 2-0.5% 22.3-6.8 mg/mL ophthalmic solution  Commonly known as:  COSOPT  Place 1 drop into the right eye 2 (two) times daily.     gabapentin 300 MG capsule  Commonly known as:  NEURONTIN     ibuprofen 400 MG tablet  Commonly known as:  ADVIL,MOTRIN     latanoprost 0.005 % ophthalmic solution  PLACE 1 DROP INTO BOTH EYES EVERY EVENING.     vitamin D 1000 units Tab              Follow-up in about 3 months (around 12/7/2018), or if symptoms worsen or fail to improve.

## 2018-10-22 ENCOUNTER — OFFICE VISIT (OUTPATIENT)
Dept: OPHTHALMOLOGY | Facility: CLINIC | Age: 83
End: 2018-10-22
Payer: MEDICARE

## 2018-10-22 DIAGNOSIS — H40.053 BORDERLINE GLAUCOMA OF BOTH EYES WITH OCULAR HYPERTENSION: Primary | ICD-10-CM

## 2018-10-22 DIAGNOSIS — R73.03 BORDERLINE DIABETES: ICD-10-CM

## 2018-10-22 DIAGNOSIS — H25.12 NUCLEAR SCLEROSIS, LEFT: ICD-10-CM

## 2018-10-22 PROCEDURE — 92012 INTRM OPH EXAM EST PATIENT: CPT | Mod: S$PBB,,, | Performed by: OPHTHALMOLOGY

## 2018-10-22 PROCEDURE — 99999 PR PBB SHADOW E&M-EST. PATIENT-LVL II: CPT | Mod: PBBFAC,,, | Performed by: OPHTHALMOLOGY

## 2018-10-22 PROCEDURE — 99212 OFFICE O/P EST SF 10 MIN: CPT | Mod: PBBFAC,PO | Performed by: OPHTHALMOLOGY

## 2018-10-22 RX ORDER — DORZOLAMIDE HYDROCHLORIDE AND TIMOLOL MALEATE 20; 5 MG/ML; MG/ML
1 SOLUTION/ DROPS OPHTHALMIC 2 TIMES DAILY
Qty: 30 ML | Refills: 3 | Status: SHIPPED | OUTPATIENT
Start: 2018-10-22 | End: 2018-10-31 | Stop reason: RX

## 2018-10-22 RX ORDER — LATANOPROST 50 UG/ML
SOLUTION/ DROPS OPHTHALMIC
Qty: 3 BOTTLE | Refills: 3 | Status: SHIPPED | OUTPATIENT
Start: 2018-10-22 | End: 2019-09-30 | Stop reason: SDUPTHER

## 2018-10-22 NOTE — PROGRESS NOTES
HPI     Glaucoma      Additional comments: 4 month iop ck with OCT rnfl              Comments     DLS: 6/22/18    Pt states does feel she is seeing as well since last visit.     Gtts: Dorzolamide-timolol BID OU, Latanoprost QHS OU          Last edited by Francis Francois Jr., MD on 10/22/2018 11:24 AM. (History)        ROS     Negative for: Constitutional, Gastrointestinal, Neurological, Skin,   Genitourinary, Musculoskeletal, HENT, Endocrine, Cardiovascular, Eyes,   Respiratory, Psychiatric, Allergic/Imm, Heme/Lymph    Last edited by Francis Francois Jr., MD on 10/22/2018 11:13 AM. (History)        Assessment /Plan     For exam results, see Encounter Report.    Borderline glaucoma of both eyes with ocular hypertension- stable IOP  Continue cosopt bid ou and latanoprost qhs ou  Follow-up in about 6 months (around 4/22/2019) for IOP, Medication check ,hvf, and gonio.    Borderline diabetes  -no retinopathy seen on DFE today  -continue good blood pressure and glucose control  -F/U for yearly DFE    Nuclear sclerosis, left  -no decrease in ADLS, no significant glare, and functionality is satisfactory  -counseled on what to expect if the cataracts worsening and how it can effect the vision  -continue to monitor and if vision changes patient can call for another appointment

## 2018-10-30 ENCOUNTER — TELEPHONE (OUTPATIENT)
Dept: OPHTHALMOLOGY | Facility: CLINIC | Age: 83
End: 2018-10-30

## 2018-10-30 NOTE — TELEPHONE ENCOUNTER
----- Message from Rachelle Bashir sent at 10/30/2018 11:38 AM CDT -----  Contact: Clarence with [facility]  [facility] about medication dorzolamide-timolol 2-0.5% (COSOPT) 22.3-6.8 mg/mL ophthalmic solution    Medication is on back order and needs to know if they can split medication     Please call back [phone]

## 2018-10-31 ENCOUNTER — TELEPHONE (OUTPATIENT)
Dept: OPHTHALMOLOGY | Facility: CLINIC | Age: 83
End: 2018-10-31

## 2018-10-31 ENCOUNTER — EXTERNAL CHRONIC CARE MANAGEMENT (OUTPATIENT)
Dept: PRIMARY CARE CLINIC | Facility: CLINIC | Age: 83
End: 2018-10-31
Payer: MEDICARE

## 2018-10-31 DIAGNOSIS — H40.053 BILATERAL OCULAR HYPERTENSION: Primary | ICD-10-CM

## 2018-10-31 PROCEDURE — 99490 CHRNC CARE MGMT STAFF 1ST 20: CPT | Mod: S$PBB,,, | Performed by: FAMILY MEDICINE

## 2018-10-31 PROCEDURE — 99490 CHRNC CARE MGMT STAFF 1ST 20: CPT | Mod: PBBFAC,PO | Performed by: FAMILY MEDICINE

## 2018-10-31 RX ORDER — TIMOLOL MALEATE 5 MG/ML
1 SOLUTION/ DROPS OPHTHALMIC 2 TIMES DAILY
Qty: 15 ML | Refills: 6 | Status: SHIPPED | OUTPATIENT
Start: 2018-10-31 | End: 2018-11-12

## 2018-10-31 RX ORDER — DORZOLAMIDE HCL 20 MG/ML
1 SOLUTION/ DROPS OPHTHALMIC 3 TIMES DAILY
Qty: 10 ML | Refills: 6 | Status: SHIPPED | OUTPATIENT
Start: 2018-10-31 | End: 2018-11-12

## 2018-10-31 NOTE — TELEPHONE ENCOUNTER
----- Message from Melanie Pedroza sent at 10/31/2018 10:26 AM CDT -----  Contact: self  Patient need to speak to Jenny but asking that she get call after 2pm    Please call to advice [phone] (home)

## 2018-11-01 DIAGNOSIS — H40.053 BILATERAL OCULAR HYPERTENSION: ICD-10-CM

## 2018-11-03 RX ORDER — DORZOLAMIDE HCL 20 MG/ML
1 SOLUTION/ DROPS OPHTHALMIC 3 TIMES DAILY
Qty: 10 ML | Refills: 6 | OUTPATIENT
Start: 2018-11-03 | End: 2018-12-03

## 2018-11-12 RX ORDER — DORZOLAMIDE HCL 20 MG/ML
1 SOLUTION/ DROPS OPHTHALMIC 3 TIMES DAILY
Qty: 10 ML | Refills: 11 | Status: SHIPPED | OUTPATIENT
Start: 2018-11-12 | End: 2019-01-11

## 2018-11-12 RX ORDER — TIMOLOL MALEATE 5 MG/ML
1 SOLUTION/ DROPS OPHTHALMIC 2 TIMES DAILY
Qty: 15 ML | Refills: 11 | Status: SHIPPED | OUTPATIENT
Start: 2018-11-12 | End: 2018-12-19

## 2018-11-30 ENCOUNTER — EXTERNAL CHRONIC CARE MANAGEMENT (OUTPATIENT)
Dept: PRIMARY CARE CLINIC | Facility: CLINIC | Age: 83
End: 2018-11-30
Payer: MEDICARE

## 2018-11-30 PROCEDURE — 99490 CHRNC CARE MGMT STAFF 1ST 20: CPT | Mod: S$PBB,,, | Performed by: FAMILY MEDICINE

## 2018-11-30 PROCEDURE — 99490 CHRNC CARE MGMT STAFF 1ST 20: CPT | Mod: PBBFAC,PO | Performed by: FAMILY MEDICINE

## 2018-12-19 ENCOUNTER — OFFICE VISIT (OUTPATIENT)
Dept: FAMILY MEDICINE | Facility: CLINIC | Age: 83
End: 2018-12-19
Payer: MEDICARE

## 2018-12-19 VITALS
RESPIRATION RATE: 18 BRPM | WEIGHT: 155.63 LBS | HEART RATE: 82 BPM | DIASTOLIC BLOOD PRESSURE: 78 MMHG | HEIGHT: 64 IN | BODY MASS INDEX: 26.57 KG/M2 | SYSTOLIC BLOOD PRESSURE: 132 MMHG

## 2018-12-19 DIAGNOSIS — E78.00 PURE HYPERCHOLESTEROLEMIA: ICD-10-CM

## 2018-12-19 DIAGNOSIS — I10 ESSENTIAL HYPERTENSION: Primary | ICD-10-CM

## 2018-12-19 DIAGNOSIS — R73.03 PREDIABETES: ICD-10-CM

## 2018-12-19 PROBLEM — M79.641 PAIN OF RIGHT HAND: Status: RESOLVED | Noted: 2017-10-17 | Resolved: 2018-12-19

## 2018-12-19 PROBLEM — D00.07: Status: RESOLVED | Noted: 2017-09-20 | Resolved: 2018-12-19

## 2018-12-19 PROCEDURE — 99214 OFFICE O/P EST MOD 30 MIN: CPT | Mod: S$PBB,,, | Performed by: FAMILY MEDICINE

## 2018-12-19 PROCEDURE — 99213 OFFICE O/P EST LOW 20 MIN: CPT | Mod: PBBFAC,PO | Performed by: FAMILY MEDICINE

## 2018-12-19 PROCEDURE — 99999 PR PBB SHADOW E&M-EST. PATIENT-LVL III: CPT | Mod: PBBFAC,,, | Performed by: FAMILY MEDICINE

## 2018-12-19 NOTE — PROGRESS NOTES
Subjective:       Patient ID: Libby Estrada is a 83 y.o. female.    Chief Complaint: Annual Exam (Patient here for annual exam)    Here for f/u htn and lipids as well chronic medical issues.  Doing well overall.      Hypertension   This is a chronic problem. The current episode started more than 1 year ago. The problem is controlled. Pertinent negatives include no chest pain, palpitations or shortness of breath.   Hyperlipidemia   This is a chronic problem. The current episode started more than 1 year ago. The problem is controlled. Pertinent negatives include no chest pain or shortness of breath.     Review of Systems   Constitutional: Negative for chills, fatigue and fever.   Respiratory: Negative for cough, chest tightness and shortness of breath.    Cardiovascular: Negative for chest pain, palpitations and leg swelling.   Endocrine: Negative for cold intolerance and heat intolerance.   Skin: Negative for rash.   Psychiatric/Behavioral: Negative for decreased concentration. The patient is not nervous/anxious.        Objective:      Physical Exam   Constitutional: She appears well-developed and well-nourished.   HENT:   Head: Normocephalic and atraumatic.   Cardiovascular: Normal rate, regular rhythm and normal heart sounds.   Pulmonary/Chest: Effort normal and breath sounds normal.   Psychiatric: She has a normal mood and affect.   Nursing note and vitals reviewed.      Assessment:       1. Essential hypertension    2. Prediabetes    3. Pure hypercholesterolemia        Plan:       Essential hypertension  -     CBC auto differential; Future; Expected date: 12/19/2018  -     Comprehensive metabolic panel; Future; Expected date: 12/19/2018  -     Hemoglobin A1c; Future; Expected date: 12/19/2018  -     Lipid panel; Future; Expected date: 12/19/2018  -     TSH; Future; Expected date: 12/19/2018    Prediabetes  -     CBC auto differential; Future; Expected date: 12/19/2018  -     Comprehensive metabolic panel;  Future; Expected date: 12/19/2018  -     Hemoglobin A1c; Future; Expected date: 12/19/2018  -     Lipid panel; Future; Expected date: 12/19/2018  -     TSH; Future; Expected date: 12/19/2018    Pure hypercholesterolemia  -     CBC auto differential; Future; Expected date: 12/19/2018  -     Comprehensive metabolic panel; Future; Expected date: 12/19/2018  -     Hemoglobin A1c; Future; Expected date: 12/19/2018  -     Lipid panel; Future; Expected date: 12/19/2018  -     TSH; Future; Expected date: 12/19/2018      Had recent labs. Update before f/u.  Doing well overall.   Will monitor chronic medical issues and continue current plan of care.  Diet/exercise and wt loss. Can not take statin unfortunately.  Follow-up in about 6 months (around 6/19/2019), or if symptoms worsen or fail to improve.

## 2018-12-31 ENCOUNTER — EXTERNAL CHRONIC CARE MANAGEMENT (OUTPATIENT)
Dept: PRIMARY CARE CLINIC | Facility: CLINIC | Age: 83
End: 2018-12-31
Payer: MEDICARE

## 2018-12-31 PROCEDURE — 99490 PR CHRONIC CARE MGMT, 1ST 20 MIN: ICD-10-PCS | Mod: S$PBB,,, | Performed by: FAMILY MEDICINE

## 2018-12-31 PROCEDURE — 99490 CHRNC CARE MGMT STAFF 1ST 20: CPT | Mod: PBBFAC,PO | Performed by: FAMILY MEDICINE

## 2018-12-31 PROCEDURE — 99490 CHRNC CARE MGMT STAFF 1ST 20: CPT | Mod: S$PBB,,, | Performed by: FAMILY MEDICINE

## 2019-01-10 ENCOUNTER — TELEPHONE (OUTPATIENT)
Dept: OPHTHALMOLOGY | Facility: CLINIC | Age: 84
End: 2019-01-10

## 2019-01-10 NOTE — TELEPHONE ENCOUNTER
----- Message from Frida Stewart sent at 1/10/2019  9:18 AM CST -----  Contact: Libby  Type: Needs Medical Advice    Who Called:  patient  Best Call Back Number: [phone]   Additional Information: patient has cracked her glasses & needs new ones but would like to know if we have an updated Rx--please advise--thank you

## 2019-01-10 NOTE — TELEPHONE ENCOUNTER
Called pt concerning glasses problem. Pt states she cracked the glass on her gls. Scheduled her for MRx appt with Dr Francois on 1/11/19.

## 2019-01-11 ENCOUNTER — OFFICE VISIT (OUTPATIENT)
Dept: OPHTHALMOLOGY | Facility: CLINIC | Age: 84
End: 2019-01-11
Payer: MEDICARE

## 2019-01-11 DIAGNOSIS — H25.12 NUCLEAR SCLEROSIS, LEFT: Primary | ICD-10-CM

## 2019-01-11 DIAGNOSIS — H40.053 BORDERLINE GLAUCOMA OF BOTH EYES WITH OCULAR HYPERTENSION: ICD-10-CM

## 2019-01-11 DIAGNOSIS — H52.7 REFRACTIVE ERROR: ICD-10-CM

## 2019-01-11 DIAGNOSIS — Z96.1 PSEUDOPHAKIA: ICD-10-CM

## 2019-01-11 PROCEDURE — 99212 OFFICE O/P EST SF 10 MIN: CPT | Mod: PBBFAC,PO | Performed by: OPHTHALMOLOGY

## 2019-01-11 PROCEDURE — 92015 PR REFRACTION: ICD-10-PCS | Mod: ,,, | Performed by: OPHTHALMOLOGY

## 2019-01-11 PROCEDURE — 92014 COMPRE OPH EXAM EST PT 1/>: CPT | Mod: S$PBB,,, | Performed by: OPHTHALMOLOGY

## 2019-01-11 PROCEDURE — 99999 PR PBB SHADOW E&M-EST. PATIENT-LVL II: CPT | Mod: PBBFAC,,, | Performed by: OPHTHALMOLOGY

## 2019-01-11 PROCEDURE — 92014 PR EYE EXAM, EST PATIENT,COMPREHESV: ICD-10-PCS | Mod: S$PBB,,, | Performed by: OPHTHALMOLOGY

## 2019-01-11 PROCEDURE — 99999 PR PBB SHADOW E&M-EST. PATIENT-LVL II: ICD-10-PCS | Mod: PBBFAC,,, | Performed by: OPHTHALMOLOGY

## 2019-01-11 PROCEDURE — 92015 DETERMINE REFRACTIVE STATE: CPT | Mod: ,,, | Performed by: OPHTHALMOLOGY

## 2019-01-11 RX ORDER — DORZOLAMIDE HYDROCHLORIDE AND TIMOLOL MALEATE 20; 5 MG/ML; MG/ML
1 SOLUTION/ DROPS OPHTHALMIC 2 TIMES DAILY
Refills: 3 | COMMUNITY
Start: 2018-11-10 | End: 2019-02-07 | Stop reason: SDUPTHER

## 2019-01-11 NOTE — LETTER
January 11, 2019      DOMINIC Salas MD  [address] [facility] [address] [place] [address]           [facility]  [address] [facility] [address] [place] [address]  Phone: [phone]  Fax: [phone]          Patient: Libby Estrada   MR Number: [id number]   YOB: 1935   Date of Visit: 1/11/2019       Dear Dr. DOMINIC Salas:    Thank you for referring Libby Estrada to me for evaluation. Attached you will find relevant portions of my assessment and plan of care.    If you have questions, please do not hesitate to call me. I look forward to following Libby Estrada along with you.    Sincerely,    Francis Francois Jr., MD    Enclosure  CC:  No Recipients    If you would like to receive this communication electronically, please contact [email or web address] or [phone] to request more information on [employer] Link access.    For providers and/or their staff who would like to refer a patient to [facility], please contact us through our one-stop-shop provider referral line, [facility], at [phone].    If you feel you have received this communication in error or would no longer like to receive these types of communications, please e-mail [email or web address]

## 2019-02-07 NOTE — TELEPHONE ENCOUNTER
Called pt concerning medication for glaucoma. Pt needs refill for dorzolamide-timolol. Told her I would send request to Dr Francois and call her when ready.

## 2019-02-07 NOTE — TELEPHONE ENCOUNTER
----- Message from Linda Velasquez sent at 2/7/2019  3:20 PM CST -----  Type: Needs Medical Advice    Who Called: Patient    Best Call Back Number: [phone] (home)     Additional Information: Would like to discuss her prescription given by Dr. Francois, please contact to discuss

## 2019-02-08 RX ORDER — DORZOLAMIDE HYDROCHLORIDE AND TIMOLOL MALEATE 20; 5 MG/ML; MG/ML
1 SOLUTION/ DROPS OPHTHALMIC 2 TIMES DAILY
Qty: 10 ML | Refills: 3 | Status: SHIPPED | OUTPATIENT
Start: 2019-02-08 | End: 2019-05-02 | Stop reason: SDUPTHER

## 2019-02-08 NOTE — TELEPHONE ENCOUNTER
Called to inform pt that Dr Francois sent in refill for her dorzolamide-timolol drop to [facility].

## 2019-05-02 RX ORDER — DORZOLAMIDE HYDROCHLORIDE AND TIMOLOL MALEATE 20; 5 MG/ML; MG/ML
SOLUTION/ DROPS OPHTHALMIC
Qty: 10 ML | Refills: 2 | Status: SHIPPED | OUTPATIENT
Start: 2019-05-02 | End: 2019-08-27 | Stop reason: SDUPTHER

## 2019-05-31 ENCOUNTER — TELEPHONE (OUTPATIENT)
Dept: FAMILY MEDICINE | Facility: CLINIC | Age: 84
End: 2019-05-31

## 2019-05-31 NOTE — TELEPHONE ENCOUNTER
----- Message from Crys Glass sent at 5/31/2019  9:33 AM CDT -----  Contact: Self [phone]  Pt is requesting a call back to discuss her elevated blood pressure 154/94 and she had a roaring sound in her head.  Pt is scheduled on 6.11.19 but is concerned and wants to discuss.  This morning her BP had returned to normal.    Pt is not sure if she should come in today or not.    Pt may be reached at [phone].    Thank you.  MAX

## 2019-05-31 NOTE — TELEPHONE ENCOUNTER
Pt states that she experienced roaring and ringing in ears, also pressure in head last night. Her bp was elevated 154/94. She states that she took an additional 81 asa that night. This morning the symptoms were gone and her bp was back to normal 116/78 and 120/79. Pt stated tht she did not notice any slurred speech or double vision last night. No slurred speech during conversation noted. Not currently on bp med. Pt instructed to go to ER if she feels weird before she hears from us, she verbalized understanding. Please advise per pt.

## 2019-05-31 NOTE — TELEPHONE ENCOUNTER
Patient states she does not need to come in right now. States her blood pressure is back to normal and she has not had any symptoms. Will continue to monitor her blood pressure and call if symptoms return.

## 2019-06-04 ENCOUNTER — PATIENT OUTREACH (OUTPATIENT)
Dept: ADMINISTRATIVE | Facility: HOSPITAL | Age: 84
End: 2019-06-04

## 2019-06-04 NOTE — PROGRESS NOTES
Health Maintenance Due   Topic Date Due    Shingles Vaccine (2 of 3) 12/23/2014    DEXA SCAN  03/09/2019     Chart review complete/scrubbed 06/04/2019  Future Appointments   Date Time Provider Department Center   6/11/2019  9:05 AM LABHARPREET [facility] [place] [place]   6/18/2019 10:40 AM DOMINIC Salas MD [facility]   7/15/2019 10:00 AM Francis Francois Jr., MD [facility]

## 2019-06-11 ENCOUNTER — LAB VISIT (OUTPATIENT)
Dept: LAB | Facility: HOSPITAL | Age: 84
End: 2019-06-11
Attending: FAMILY MEDICINE
Payer: MEDICARE

## 2019-06-11 DIAGNOSIS — E78.00 PURE HYPERCHOLESTEROLEMIA: ICD-10-CM

## 2019-06-11 DIAGNOSIS — R73.03 PREDIABETES: ICD-10-CM

## 2019-06-11 DIAGNOSIS — I10 ESSENTIAL HYPERTENSION: ICD-10-CM

## 2019-06-11 LAB
ALBUMIN SERPL BCP-MCNC: 3.8 G/DL (ref 3.5–5.2)
ALP SERPL-CCNC: 77 U/L (ref 55–135)
ALT SERPL W/O P-5'-P-CCNC: 12 U/L (ref 10–44)
ANION GAP SERPL CALC-SCNC: 8 MMOL/L (ref 8–16)
AST SERPL-CCNC: 16 U/L (ref 10–40)
BASOPHILS # BLD AUTO: 0.06 K/UL (ref 0–0.2)
BASOPHILS NFR BLD: 0.9 % (ref 0–1.9)
BILIRUB SERPL-MCNC: 0.3 MG/DL (ref 0.1–1)
BUN SERPL-MCNC: 14 MG/DL (ref 8–23)
CALCIUM SERPL-MCNC: 9.7 MG/DL (ref 8.7–10.5)
CHLORIDE SERPL-SCNC: 103 MMOL/L (ref 95–110)
CHOLEST SERPL-MCNC: 237 MG/DL (ref 120–199)
CHOLEST/HDLC SERPL: 3.6 {RATIO} (ref 2–5)
CO2 SERPL-SCNC: 27 MMOL/L (ref 23–29)
CREAT SERPL-MCNC: 0.8 MG/DL (ref 0.5–1.4)
DIFFERENTIAL METHOD: NORMAL
EOSINOPHIL # BLD AUTO: 0.2 K/UL (ref 0–0.5)
EOSINOPHIL NFR BLD: 3.6 % (ref 0–8)
ERYTHROCYTE [DISTWIDTH] IN BLOOD BY AUTOMATED COUNT: 13.1 % (ref 11.5–14.5)
EST. GFR  (AFRICAN AMERICAN): >60 ML/MIN/1.73 M^2
EST. GFR  (NON AFRICAN AMERICAN): >60 ML/MIN/1.73 M^2
ESTIMATED AVG GLUCOSE: 126 MG/DL (ref 68–131)
GLUCOSE SERPL-MCNC: 108 MG/DL (ref 70–110)
HBA1C MFR BLD HPLC: 6 % (ref 4–5.6)
HCT VFR BLD AUTO: 39 % (ref 37–48.5)
HDLC SERPL-MCNC: 66 MG/DL (ref 40–75)
HDLC SERPL: 27.8 % (ref 20–50)
HGB BLD-MCNC: 12.6 G/DL (ref 12–16)
IMM GRANULOCYTES # BLD AUTO: 0.01 K/UL (ref 0–0.04)
IMM GRANULOCYTES NFR BLD AUTO: 0.1 % (ref 0–0.5)
LDLC SERPL CALC-MCNC: 151.8 MG/DL (ref 63–159)
LYMPHOCYTES # BLD AUTO: 1.7 K/UL (ref 1–4.8)
LYMPHOCYTES NFR BLD: 25 % (ref 18–48)
MCH RBC QN AUTO: 29 PG (ref 27–31)
MCHC RBC AUTO-ENTMCNC: 32.3 G/DL (ref 32–36)
MCV RBC AUTO: 90 FL (ref 82–98)
MONOCYTES # BLD AUTO: 0.6 K/UL (ref 0.3–1)
MONOCYTES NFR BLD: 8.6 % (ref 4–15)
NEUTROPHILS # BLD AUTO: 4.2 K/UL (ref 1.8–7.7)
NEUTROPHILS NFR BLD: 61.8 % (ref 38–73)
NONHDLC SERPL-MCNC: 171 MG/DL
NRBC BLD-RTO: 0 /100 WBC
PLATELET # BLD AUTO: 279 K/UL (ref 150–350)
PMV BLD AUTO: 10.3 FL (ref 9.2–12.9)
POTASSIUM SERPL-SCNC: 4.3 MMOL/L (ref 3.5–5.1)
PROT SERPL-MCNC: 7.5 G/DL (ref 6–8.4)
RBC # BLD AUTO: 4.34 M/UL (ref 4–5.4)
SODIUM SERPL-SCNC: 138 MMOL/L (ref 136–145)
TRIGL SERPL-MCNC: 96 MG/DL (ref 30–150)
TSH SERPL DL<=0.005 MIU/L-ACNC: 1.1 UIU/ML (ref 0.4–4)
WBC # BLD AUTO: 6.72 K/UL (ref 3.9–12.7)

## 2019-06-11 PROCEDURE — 36415 COLL VENOUS BLD VENIPUNCTURE: CPT | Mod: PO

## 2019-06-11 PROCEDURE — 85025 COMPLETE CBC W/AUTO DIFF WBC: CPT

## 2019-06-11 PROCEDURE — 80053 COMPREHEN METABOLIC PANEL: CPT

## 2019-06-11 PROCEDURE — 84443 ASSAY THYROID STIM HORMONE: CPT

## 2019-06-11 PROCEDURE — 83036 HEMOGLOBIN GLYCOSYLATED A1C: CPT

## 2019-06-11 PROCEDURE — 80061 LIPID PANEL: CPT

## 2019-06-18 ENCOUNTER — OFFICE VISIT (OUTPATIENT)
Dept: FAMILY MEDICINE | Facility: CLINIC | Age: 84
End: 2019-06-18
Payer: MEDICARE

## 2019-06-18 ENCOUNTER — HOSPITAL ENCOUNTER (OUTPATIENT)
Dept: RADIOLOGY | Facility: HOSPITAL | Age: 84
Discharge: HOME | End: 2019-06-18
Attending: FAMILY MEDICINE
Payer: MEDICARE

## 2019-06-18 VITALS
HEIGHT: 64 IN | OXYGEN SATURATION: 96 % | BODY MASS INDEX: 26.46 KG/M2 | DIASTOLIC BLOOD PRESSURE: 80 MMHG | HEART RATE: 80 BPM | SYSTOLIC BLOOD PRESSURE: 110 MMHG | WEIGHT: 155 LBS

## 2019-06-18 DIAGNOSIS — Z13.820 SCREENING FOR OSTEOPOROSIS: ICD-10-CM

## 2019-06-18 DIAGNOSIS — Z78.0 POSTMENOPAUSAL: ICD-10-CM

## 2019-06-18 DIAGNOSIS — R73.03 PREDIABETES: ICD-10-CM

## 2019-06-18 DIAGNOSIS — E78.00 PURE HYPERCHOLESTEROLEMIA: Primary | ICD-10-CM

## 2019-06-18 PROCEDURE — 77080 DXA BONE DENSITY AXIAL: CPT | Mod: TC,PO

## 2019-06-18 PROCEDURE — 99213 OFFICE O/P EST LOW 20 MIN: CPT | Mod: PBBFAC,PO | Performed by: FAMILY MEDICINE

## 2019-06-18 PROCEDURE — 77080 DXA BONE DENSITY AXIAL: CPT | Mod: 26,,, | Performed by: RADIOLOGY

## 2019-06-18 PROCEDURE — 99999 PR PBB SHADOW E&M-EST. PATIENT-LVL III: CPT | Mod: PBBFAC,,, | Performed by: FAMILY MEDICINE

## 2019-06-18 PROCEDURE — 99214 OFFICE O/P EST MOD 30 MIN: CPT | Mod: S$PBB,,, | Performed by: FAMILY MEDICINE

## 2019-06-18 PROCEDURE — 77080 DEXA BONE DENSITY SPINE HIP: ICD-10-PCS | Mod: 26,,, | Performed by: RADIOLOGY

## 2019-06-18 PROCEDURE — 99999 PR PBB SHADOW E&M-EST. PATIENT-LVL III: ICD-10-PCS | Mod: PBBFAC,,, | Performed by: FAMILY MEDICINE

## 2019-06-18 PROCEDURE — 99214 PR OFFICE/OUTPT VISIT, EST, LEVL IV, 30-39 MIN: ICD-10-PCS | Mod: S$PBB,,, | Performed by: FAMILY MEDICINE

## 2019-06-18 NOTE — PROGRESS NOTES
Subjective:       Patient ID: Libby Estrada is a 84 y.o. female.    Chief Complaint: Hypertension    Here for f/u lipids and chronic medical issues. Doing well overall. Clarified no history of htn so updated chart. Had recent labs. Follows with Dr. Cristina for chronic back pain and stable on gabapentin.    Hyperlipidemia   This is a chronic problem. The current episode started more than 1 year ago. The problem is controlled. Pertinent negatives include no chest pain or shortness of breath.     Review of Systems   Constitutional: Negative for chills, fatigue and fever.   Respiratory: Negative for cough, chest tightness and shortness of breath.    Cardiovascular: Negative for chest pain, palpitations and leg swelling.   Endocrine: Negative for cold intolerance and heat intolerance.   Musculoskeletal: Positive for back pain.   Skin: Negative for rash.   Psychiatric/Behavioral: Negative for dysphoric mood. The patient is not nervous/anxious.        Objective:      Physical Exam   Constitutional: She appears well-developed and well-nourished.   HENT:   Head: Normocephalic and atraumatic.   Cardiovascular: Normal rate, regular rhythm and normal heart sounds.   Pulmonary/Chest: Effort normal and breath sounds normal.   Psychiatric: She has a normal mood and affect.   Nursing note and vitals reviewed.      Assessment:       1. Pure hypercholesterolemia    2. Prediabetes    3. Postmenopausal    4. Screening for osteoporosis        Plan:       Pure hypercholesterolemia  -     CBC auto differential; Future; Expected date: 06/18/2019  -     Comprehensive metabolic panel; Future; Expected date: 06/18/2019  -     Hemoglobin A1c; Future; Expected date: 06/18/2019  -     Lipid panel; Future; Expected date: 06/18/2019  -     TSH; Future; Expected date: 06/18/2019    Prediabetes  -     CBC auto differential; Future; Expected date: 06/18/2019  -     Comprehensive metabolic panel; Future; Expected date: 06/18/2019  -     Hemoglobin  A1c; Future; Expected date: 06/18/2019  -     Lipid panel; Future; Expected date: 06/18/2019  -     TSH; Future; Expected date: 06/18/2019    Postmenopausal  -     DXA Bone Density Spine And Hip; Future; Expected date: 06/18/2019    Screening for osteoporosis  -     DXA Bone Density Spine And Hip; Future; Expected date: 06/18/2019    shingles vaccine at [facility]  Labs stable and lipid improved with diet  hga1c stable  Will monitor chronic medical issues and continue current plan of care.        Follow up in about 6 months (around 12/18/2019), or if symptoms worsen or fail to improve.

## 2019-06-30 ENCOUNTER — EXTERNAL CHRONIC CARE MANAGEMENT (OUTPATIENT)
Dept: PRIMARY CARE CLINIC | Facility: CLINIC | Age: 84
End: 2019-06-30
Payer: MEDICARE

## 2019-06-30 PROCEDURE — 99490 CHRNC CARE MGMT STAFF 1ST 20: CPT | Mod: S$PBB,,, | Performed by: FAMILY MEDICINE

## 2019-06-30 PROCEDURE — 99490 CHRNC CARE MGMT STAFF 1ST 20: CPT | Mod: PBBFAC,PO | Performed by: FAMILY MEDICINE

## 2019-06-30 PROCEDURE — 99490 PR CHRONIC CARE MGMT, 1ST 20 MIN: ICD-10-PCS | Mod: S$PBB,,, | Performed by: FAMILY MEDICINE

## 2019-07-09 RX ORDER — DORZOLAMIDE HYDROCHLORIDE AND TIMOLOL MALEATE 20; 5 MG/ML; MG/ML
1 SOLUTION/ DROPS OPHTHALMIC 2 TIMES DAILY
Qty: 10 ML | Refills: 2 | OUTPATIENT
Start: 2019-07-09

## 2019-07-15 ENCOUNTER — OFFICE VISIT (OUTPATIENT)
Dept: OPHTHALMOLOGY | Facility: CLINIC | Age: 84
End: 2019-07-15
Payer: MEDICARE

## 2019-07-15 DIAGNOSIS — Z96.1 PSEUDOPHAKIA: ICD-10-CM

## 2019-07-15 DIAGNOSIS — H40.053 BORDERLINE GLAUCOMA OF BOTH EYES WITH OCULAR HYPERTENSION: ICD-10-CM

## 2019-07-15 DIAGNOSIS — H25.12 NUCLEAR SCLEROSIS, LEFT: Primary | ICD-10-CM

## 2019-07-15 PROCEDURE — 99999 PR PBB SHADOW E&M-EST. PATIENT-LVL III: ICD-10-PCS | Mod: PBBFAC,,, | Performed by: OPHTHALMOLOGY

## 2019-07-15 PROCEDURE — 92014 PR EYE EXAM, EST PATIENT,COMPREHESV: ICD-10-PCS | Mod: S$PBB,,, | Performed by: OPHTHALMOLOGY

## 2019-07-15 PROCEDURE — 92014 COMPRE OPH EXAM EST PT 1/>: CPT | Mod: S$PBB,,, | Performed by: OPHTHALMOLOGY

## 2019-07-15 PROCEDURE — 99999 PR PBB SHADOW E&M-EST. PATIENT-LVL III: CPT | Mod: PBBFAC,,, | Performed by: OPHTHALMOLOGY

## 2019-07-15 PROCEDURE — 99213 OFFICE O/P EST LOW 20 MIN: CPT | Mod: PBBFAC,PO | Performed by: OPHTHALMOLOGY

## 2019-07-15 NOTE — PROGRESS NOTES
HPI     Glaucoma      Additional comments: 6 month iop/med and cataract OD check              Comments     DLS: 1/11/19    Pt states sometimes after reading a while her vision will get blurred va   OU.     Gtts: Cosopt BID, Latanoprost QHS OU          Last edited by Cheryle Quintana on 7/15/2019 10:20 AM. (History)        ROS     Negative for: Constitutional, Gastrointestinal, Neurological, Skin,   Genitourinary, Musculoskeletal, HENT, Endocrine, Cardiovascular, Eyes,   Respiratory, Psychiatric, Allergic/Imm, Heme/Lymph    Last edited by Francis Francois Jr., MD on 7/15/2019 11:11 AM. (History)        Assessment /Plan     For exam results, see Encounter Report.    Nuclear sclerosis, left    Borderline glaucoma of both eyes with ocular hypertension    Pseudophakia    Nuclear sclerosis, left  -no decrease in ADLS, no significant glare, and functionality is satisfactory  -counseled on what to expect if the cataracts worsening and how it can effect the vision  -continue to monitor and if vision changes patient can call for another appointment    Borderline glaucoma of both eyes with ocular hypertension  stable IOP 16 and 15  Continue cosopt bid ou and latanoprost qhs ou  Follow up in about 6 months (around 1/15/2020) for IOP and Medication check.     Pseudophakia- stable, observe

## 2019-07-15 NOTE — PATIENT INSTRUCTIONS
"  What Is Glaucoma?    Glaucoma is an eye disease that can cause blindness. If caught early, it can usually be controlled. But it often has no symptoms, so you need regular eye exams. Glaucoma usually begins when pressure builds up in the eye. This pressure can damage the optic nerve. The optic nerve sends messages to the brain so you can see. There are two main kinds of glaucoma: "open-angle" and "closed-angle."  Drainage area  The eye is always producing fluid. The eye's drainage areas may become clogged or blocked. Too much fluid stays in the eye. This increases eye pressure.  Optic nerve  Too much pressure in the eye can damage the optic nerve. If damaged, this nerve cannot send the messages to the brain that let you see.  Open-Angle Glaucoma  Open-angle is the most common kind of glaucoma. It occurs slowly as people age. The drainage area in the eye becomes clogged. Not enough fluid drains from the eye, so pressure slowly builds up. This causes gradual loss of side (peripheral) vision. You may not even notice changes until much of your vision is lost.  Closed-Angle Glaucoma  Closed-angle glaucoma is less common than open-angle. It usually comes on quickly. The drainage area in the eye suddenly becomes completely blocked. Eye pressure builds rapidly. You may notice blurred vision and rainbow halos around lights. You may also have headaches, nausea, vomiting, and severe pain. If not treated right away, blindness can occur quickly.  Date Last Reviewed: 6/1/2015  © 2080-7860 [employer]. [address] [place] [address], [place] [address]. All rights reserved. This information is not intended as a substitute for professional medical care. Always follow your healthcare professional's instructions.        "

## 2019-08-27 RX ORDER — DORZOLAMIDE HYDROCHLORIDE AND TIMOLOL MALEATE 20; 5 MG/ML; MG/ML
SOLUTION/ DROPS OPHTHALMIC
Qty: 30 ML | Refills: 1 | Status: SHIPPED | OUTPATIENT
Start: 2019-08-27 | End: 2020-02-18

## 2019-08-31 ENCOUNTER — EXTERNAL CHRONIC CARE MANAGEMENT (OUTPATIENT)
Dept: PRIMARY CARE CLINIC | Facility: CLINIC | Age: 84
End: 2019-08-31
Payer: MEDICARE

## 2019-08-31 PROCEDURE — 99490 PR CHRONIC CARE MGMT, 1ST 20 MIN: ICD-10-PCS | Mod: S$PBB,,, | Performed by: FAMILY MEDICINE

## 2019-08-31 PROCEDURE — 99490 CHRNC CARE MGMT STAFF 1ST 20: CPT | Mod: PBBFAC,PO | Performed by: FAMILY MEDICINE

## 2019-08-31 PROCEDURE — 99490 CHRNC CARE MGMT STAFF 1ST 20: CPT | Mod: S$PBB,,, | Performed by: FAMILY MEDICINE

## 2019-09-30 ENCOUNTER — EXTERNAL CHRONIC CARE MANAGEMENT (OUTPATIENT)
Dept: PRIMARY CARE CLINIC | Facility: CLINIC | Age: 84
End: 2019-09-30
Payer: MEDICARE

## 2019-09-30 DIAGNOSIS — H40.053 BORDERLINE GLAUCOMA OF BOTH EYES WITH OCULAR HYPERTENSION: ICD-10-CM

## 2019-09-30 PROCEDURE — 99490 CHRNC CARE MGMT STAFF 1ST 20: CPT | Mod: PBBFAC,PO | Performed by: FAMILY MEDICINE

## 2019-09-30 PROCEDURE — 99490 CHRNC CARE MGMT STAFF 1ST 20: CPT | Mod: S$PBB,,, | Performed by: FAMILY MEDICINE

## 2019-09-30 PROCEDURE — 99490 PR CHRONIC CARE MGMT, 1ST 20 MIN: ICD-10-PCS | Mod: S$PBB,,, | Performed by: FAMILY MEDICINE

## 2019-09-30 RX ORDER — LATANOPROST 50 UG/ML
SOLUTION/ DROPS OPHTHALMIC
Qty: 2.5 ML | Refills: 12 | Status: SHIPPED | OUTPATIENT
Start: 2019-09-30 | End: 2020-09-17 | Stop reason: SDUPTHER

## 2019-10-16 ENCOUNTER — PES CALL (OUTPATIENT)
Dept: ADMINISTRATIVE | Facility: CLINIC | Age: 84
End: 2019-10-16

## 2019-11-05 ENCOUNTER — OFFICE VISIT (OUTPATIENT)
Dept: OPHTHALMOLOGY | Facility: CLINIC | Age: 84
End: 2019-11-05
Payer: MEDICARE

## 2019-11-05 DIAGNOSIS — H25.12 NUCLEAR SCLEROSIS, LEFT: Primary | ICD-10-CM

## 2019-11-05 DIAGNOSIS — Z96.1 PSEUDOPHAKIA: ICD-10-CM

## 2019-11-05 DIAGNOSIS — H52.7 REFRACTIVE ERROR: ICD-10-CM

## 2019-11-05 DIAGNOSIS — H40.053 BORDERLINE GLAUCOMA OF BOTH EYES WITH OCULAR HYPERTENSION: ICD-10-CM

## 2019-11-05 PROCEDURE — 92015 DETERMINE REFRACTIVE STATE: CPT | Mod: ,,, | Performed by: OPHTHALMOLOGY

## 2019-11-05 PROCEDURE — 99213 OFFICE O/P EST LOW 20 MIN: CPT | Mod: PBBFAC,PO | Performed by: OPHTHALMOLOGY

## 2019-11-05 PROCEDURE — 92014 COMPRE OPH EXAM EST PT 1/>: CPT | Mod: S$PBB,,, | Performed by: OPHTHALMOLOGY

## 2019-11-05 PROCEDURE — 92014 PR EYE EXAM, EST PATIENT,COMPREHESV: ICD-10-PCS | Mod: S$PBB,,, | Performed by: OPHTHALMOLOGY

## 2019-11-05 PROCEDURE — 99999 PR PBB SHADOW E&M-EST. PATIENT-LVL III: CPT | Mod: PBBFAC,,, | Performed by: OPHTHALMOLOGY

## 2019-11-05 PROCEDURE — 92015 PR REFRACTION: ICD-10-PCS | Mod: ,,, | Performed by: OPHTHALMOLOGY

## 2019-11-05 PROCEDURE — 99999 PR PBB SHADOW E&M-EST. PATIENT-LVL III: ICD-10-PCS | Mod: PBBFAC,,, | Performed by: OPHTHALMOLOGY

## 2019-11-05 NOTE — PROGRESS NOTES
HPI     Blurred Vision      Additional comments: blurred va with glasses she purchased in 1/2019//   Never has been good for her//              Comments     blurred va with glasses she purchased in 1/2019// Never has been good for   her//  Gl pt noit sure if medicare will cover an IOP so \she would like to wait   until she comes back for iol ch//  pT STATES MEDICARE WILL NOT PAY  IF not   enough time has gone by..//          Last edited by Rachelle gM on 11/5/2019 11:00 AM. (History)            Assessment /Plan     For exam results, see Encounter Report.    Nuclear sclerosis, left    Borderline glaucoma of both eyes with ocular hypertension    Pseudophakia    Refractive error- last pair of glasses checked appears the alignment in the glasses was off but will hold off on glasses until surgery      -schedule cataract surgery Left eye  -R[employer] benefits discussed with patient  -Risks of worse vision, loss of vision, infection, bleeding, re-surgery,and may need to have surgery done by a different physician was explained to the patient  -patient was also counseled on premium lens options, laser cataract, and astigmatic correction  -patient was also counseled that they may still need glasses after surgery to help improve their vision, especially the need for reading glasses for reading after surgery  -patient understood the risks involved with cataract surgery and wanted to move forward with surgery

## 2019-11-05 NOTE — PATIENT INSTRUCTIONS
Small-Incision Cataract Surgery: Implanting the New Lens    Once your old lens has been removed, your doctor slips the new lens (IOL or intraocular lens) in through the incision. The IOL is then placed in the capsule that held your old lens. With the new lens in place, your doctor is ready to close the incision. Some incisions may be closed with stitches. Others are self-sealing. That means they will stay closed on their own without stitches. The IOL does much the same thing as your old lens did before it became cloudy. It focuses light, letting you see sharp images and vivid colors. The IOL normally lasts a lifetime.  How small is an IOL?     Date Last Reviewed: 6/14/2015  © 6024-1269 [employer]. [address] [place] [address], [place] [address]. All rights reserved. This information is not intended as a substitute for professional medical care. Always follow your healthcare professional's instructions.

## 2019-12-12 ENCOUNTER — LAB VISIT (OUTPATIENT)
Dept: LAB | Facility: HOSPITAL | Age: 84
End: 2019-12-12
Attending: FAMILY MEDICINE
Payer: MEDICARE

## 2019-12-12 DIAGNOSIS — E78.00 PURE HYPERCHOLESTEROLEMIA: ICD-10-CM

## 2019-12-12 DIAGNOSIS — R73.03 PREDIABETES: ICD-10-CM

## 2019-12-12 LAB
ALBUMIN SERPL BCP-MCNC: 3.8 G/DL (ref 3.5–5.2)
ALP SERPL-CCNC: 69 U/L (ref 55–135)
ALT SERPL W/O P-5'-P-CCNC: 12 U/L (ref 10–44)
ANION GAP SERPL CALC-SCNC: 8 MMOL/L (ref 8–16)
AST SERPL-CCNC: 17 U/L (ref 10–40)
BASOPHILS # BLD AUTO: 0.05 K/UL (ref 0–0.2)
BASOPHILS NFR BLD: 0.8 % (ref 0–1.9)
BILIRUB SERPL-MCNC: 0.4 MG/DL (ref 0.1–1)
BUN SERPL-MCNC: 12 MG/DL (ref 8–23)
CALCIUM SERPL-MCNC: 9.7 MG/DL (ref 8.7–10.5)
CHLORIDE SERPL-SCNC: 105 MMOL/L (ref 95–110)
CHOLEST SERPL-MCNC: 251 MG/DL (ref 120–199)
CHOLEST/HDLC SERPL: 3.6 {RATIO} (ref 2–5)
CO2 SERPL-SCNC: 27 MMOL/L (ref 23–29)
CREAT SERPL-MCNC: 0.8 MG/DL (ref 0.5–1.4)
DIFFERENTIAL METHOD: ABNORMAL
EOSINOPHIL # BLD AUTO: 0.3 K/UL (ref 0–0.5)
EOSINOPHIL NFR BLD: 4.3 % (ref 0–8)
ERYTHROCYTE [DISTWIDTH] IN BLOOD BY AUTOMATED COUNT: 13.1 % (ref 11.5–14.5)
EST. GFR  (AFRICAN AMERICAN): >60 ML/MIN/1.73 M^2
EST. GFR  (NON AFRICAN AMERICAN): >60 ML/MIN/1.73 M^2
ESTIMATED AVG GLUCOSE: 128 MG/DL (ref 68–131)
GLUCOSE SERPL-MCNC: 106 MG/DL (ref 70–110)
HBA1C MFR BLD HPLC: 6.1 % (ref 4–5.6)
HCT VFR BLD AUTO: 40.7 % (ref 37–48.5)
HDLC SERPL-MCNC: 69 MG/DL (ref 40–75)
HDLC SERPL: 27.5 % (ref 20–50)
HGB BLD-MCNC: 13 G/DL (ref 12–16)
IMM GRANULOCYTES # BLD AUTO: 0.01 K/UL (ref 0–0.04)
IMM GRANULOCYTES NFR BLD AUTO: 0.2 % (ref 0–0.5)
LDLC SERPL CALC-MCNC: 163 MG/DL (ref 63–159)
LYMPHOCYTES # BLD AUTO: 1.5 K/UL (ref 1–4.8)
LYMPHOCYTES NFR BLD: 23.7 % (ref 18–48)
MCH RBC QN AUTO: 29.1 PG (ref 27–31)
MCHC RBC AUTO-ENTMCNC: 31.9 G/DL (ref 32–36)
MCV RBC AUTO: 91 FL (ref 82–98)
MONOCYTES # BLD AUTO: 0.6 K/UL (ref 0.3–1)
MONOCYTES NFR BLD: 8.7 % (ref 4–15)
NEUTROPHILS # BLD AUTO: 4 K/UL (ref 1.8–7.7)
NEUTROPHILS NFR BLD: 62.3 % (ref 38–73)
NONHDLC SERPL-MCNC: 182 MG/DL
NRBC BLD-RTO: 0 /100 WBC
PLATELET # BLD AUTO: 269 K/UL (ref 150–350)
PMV BLD AUTO: 10.4 FL (ref 9.2–12.9)
POTASSIUM SERPL-SCNC: 4.3 MMOL/L (ref 3.5–5.1)
PROT SERPL-MCNC: 7.7 G/DL (ref 6–8.4)
RBC # BLD AUTO: 4.47 M/UL (ref 4–5.4)
SODIUM SERPL-SCNC: 140 MMOL/L (ref 136–145)
TRIGL SERPL-MCNC: 95 MG/DL (ref 30–150)
TSH SERPL DL<=0.005 MIU/L-ACNC: 1.13 UIU/ML (ref 0.4–4)
WBC # BLD AUTO: 6.34 K/UL (ref 3.9–12.7)

## 2019-12-12 PROCEDURE — 83036 HEMOGLOBIN GLYCOSYLATED A1C: CPT

## 2019-12-12 PROCEDURE — 84443 ASSAY THYROID STIM HORMONE: CPT

## 2019-12-12 PROCEDURE — 85025 COMPLETE CBC W/AUTO DIFF WBC: CPT

## 2019-12-12 PROCEDURE — 36415 COLL VENOUS BLD VENIPUNCTURE: CPT | Mod: PO

## 2019-12-12 PROCEDURE — 80053 COMPREHEN METABOLIC PANEL: CPT

## 2019-12-12 PROCEDURE — 80061 LIPID PANEL: CPT

## 2019-12-19 ENCOUNTER — OFFICE VISIT (OUTPATIENT)
Dept: FAMILY MEDICINE | Facility: CLINIC | Age: 84
End: 2019-12-19
Payer: MEDICARE

## 2019-12-19 VITALS
TEMPERATURE: 98 F | DIASTOLIC BLOOD PRESSURE: 80 MMHG | OXYGEN SATURATION: 96 % | SYSTOLIC BLOOD PRESSURE: 138 MMHG | BODY MASS INDEX: 27.25 KG/M2 | HEART RATE: 96 BPM | WEIGHT: 158.75 LBS

## 2019-12-19 DIAGNOSIS — R73.03 PREDIABETES: ICD-10-CM

## 2019-12-19 DIAGNOSIS — E78.00 PURE HYPERCHOLESTEROLEMIA: ICD-10-CM

## 2019-12-19 DIAGNOSIS — I10 ESSENTIAL HYPERTENSION: Primary | ICD-10-CM

## 2019-12-19 PROCEDURE — 99214 PR OFFICE/OUTPT VISIT, EST, LEVL IV, 30-39 MIN: ICD-10-PCS | Mod: S$PBB,,, | Performed by: FAMILY MEDICINE

## 2019-12-19 PROCEDURE — 1159F PR MEDICATION LIST DOCUMENTED IN MEDICAL RECORD: ICD-10-PCS | Mod: ,,, | Performed by: FAMILY MEDICINE

## 2019-12-19 PROCEDURE — 1125F AMNT PAIN NOTED PAIN PRSNT: CPT | Mod: ,,, | Performed by: FAMILY MEDICINE

## 2019-12-19 PROCEDURE — 99213 OFFICE O/P EST LOW 20 MIN: CPT | Mod: PBBFAC,PO | Performed by: FAMILY MEDICINE

## 2019-12-19 PROCEDURE — 99999 PR PBB SHADOW E&M-EST. PATIENT-LVL III: ICD-10-PCS | Mod: PBBFAC,,, | Performed by: FAMILY MEDICINE

## 2019-12-19 PROCEDURE — 1159F MED LIST DOCD IN RCRD: CPT | Mod: ,,, | Performed by: FAMILY MEDICINE

## 2019-12-19 PROCEDURE — 99999 PR PBB SHADOW E&M-EST. PATIENT-LVL III: CPT | Mod: PBBFAC,,, | Performed by: FAMILY MEDICINE

## 2019-12-19 PROCEDURE — 99214 OFFICE O/P EST MOD 30 MIN: CPT | Mod: S$PBB,,, | Performed by: FAMILY MEDICINE

## 2019-12-19 PROCEDURE — 1125F PR PAIN SEVERITY QUANTIFIED, PAIN PRESENT: ICD-10-PCS | Mod: ,,, | Performed by: FAMILY MEDICINE

## 2019-12-19 RX ORDER — EZETIMIBE 10 MG/1
10 TABLET ORAL DAILY
Qty: 90 TABLET | Refills: 3 | Status: SHIPPED | OUTPATIENT
Start: 2019-12-19 | End: 2020-11-10

## 2019-12-19 NOTE — PROGRESS NOTES
Subjective:       Patient ID: Libby Estrada is a 84 y.o. female.    Chief Complaint: Follow-up    Here for f/u htn and lipids. Doing well overall. Had recent labs.    Hypertension   This is a chronic problem. The current episode started more than 1 year ago. The problem is controlled. Pertinent negatives include no chest pain, palpitations or shortness of breath.   Hyperlipidemia   This is a chronic problem. The current episode started more than 1 year ago. The problem is uncontrolled. Pertinent negatives include no chest pain or shortness of breath.     Review of Systems   Constitutional: Negative for chills and fever.   Respiratory: Negative for cough, chest tightness and shortness of breath.    Cardiovascular: Negative for chest pain, palpitations and leg swelling.   Endocrine: Negative for cold intolerance and heat intolerance.   Psychiatric/Behavioral: Negative for decreased concentration. The patient is not nervous/anxious.        Objective:      Physical Exam   Constitutional: She appears well-developed and well-nourished.   HENT:   Head: Normocephalic and atraumatic.   Cardiovascular: Normal rate, regular rhythm and normal heart sounds.   Pulmonary/Chest: Effort normal and breath sounds normal.   Psychiatric: She has a normal mood and affect.   Nursing note and vitals reviewed.      Assessment:       1. Essential hypertension    2. Pure hypercholesterolemia    3. Prediabetes        Plan:       Essential hypertension  -     CBC auto differential; Future; Expected date: 12/19/2019  -     Comprehensive metabolic panel; Future; Expected date: 12/19/2019  -     Lipid panel; Future; Expected date: 12/19/2019  -     TSH; Future; Expected date: 12/19/2019  -     Hemoglobin A1c; Future; Expected date: 12/19/2019    Pure hypercholesterolemia  -     CBC auto differential; Future; Expected date: 12/19/2019  -     Comprehensive metabolic panel; Future; Expected date: 12/19/2019  -     Lipid panel; Future; Expected date:  12/19/2019  -     TSH; Future; Expected date: 12/19/2019  -     Hemoglobin A1c; Future; Expected date: 12/19/2019    Prediabetes  -     CBC auto differential; Future; Expected date: 12/19/2019  -     Comprehensive metabolic panel; Future; Expected date: 12/19/2019  -     Lipid panel; Future; Expected date: 12/19/2019  -     TSH; Future; Expected date: 12/19/2019  -     Hemoglobin A1c; Future; Expected date: 12/19/2019    Other orders  -     ezetimibe (ZETIA) 10 mg tablet; Take 1 tablet (10 mg total) by mouth once daily.  Dispense: 90 tablet; Refill: 3        htn controlled with lifestyle  Lipid not at goal and plans diet/exercise  Trial of zetia as statin intolerant  Will monitor chronic medical issues and continue current plan of care.    Follow up in about 6 months (around 6/19/2020), or if symptoms worsen or fail to improve.

## 2020-01-02 ENCOUNTER — TELEPHONE (OUTPATIENT)
Dept: OPHTHALMOLOGY | Facility: CLINIC | Age: 85
End: 2020-01-02

## 2020-01-02 NOTE — TELEPHONE ENCOUNTER
Patient returning call to reschedule appointment due to provider being out date of physical. Patient leaves for school 8/30. Is patient able to be scheduled on 8/29? There are no physical appointments until late September.    Rachelle Salas [place] Staff Just now (5:27 PM)     Hello, pt is a mutual pt of ours that will be having topical anes cataract surgery in 1/16/2020.  Can she be cleared by Dr Salas's chart note from 12/19/19?  Please let us know.  Thanks

## 2020-01-03 ENCOUNTER — OFFICE VISIT (OUTPATIENT)
Dept: OPHTHALMOLOGY | Facility: CLINIC | Age: 85
End: 2020-01-03
Payer: MEDICARE

## 2020-01-03 DIAGNOSIS — H25.12 AGE-RELATED NUCLEAR CATARACT OF LEFT EYE: Primary | ICD-10-CM

## 2020-01-03 PROCEDURE — 92136 IOL MASTER - OS - LEFT EYE: ICD-10-PCS | Mod: 26,S$PBB,, | Performed by: OPHTHALMOLOGY

## 2020-01-03 PROCEDURE — 99999 PR PBB SHADOW E&M-EST. PATIENT-LVL III: ICD-10-PCS | Mod: PBBFAC,,, | Performed by: OPHTHALMOLOGY

## 2020-01-03 PROCEDURE — 99999 PR PBB SHADOW E&M-EST. PATIENT-LVL III: CPT | Mod: PBBFAC,,, | Performed by: OPHTHALMOLOGY

## 2020-01-03 PROCEDURE — 92025 COMPUTERIZED CORNEAL TOPOGRAPHY: ICD-10-PCS | Mod: 26,S$PBB,, | Performed by: OPHTHALMOLOGY

## 2020-01-03 PROCEDURE — 99499 NO LOS: ICD-10-PCS | Mod: S$PBB,,, | Performed by: OPHTHALMOLOGY

## 2020-01-03 PROCEDURE — 99499 UNLISTED E&M SERVICE: CPT | Mod: S$PBB,,, | Performed by: OPHTHALMOLOGY

## 2020-01-03 PROCEDURE — 99213 OFFICE O/P EST LOW 20 MIN: CPT | Mod: PBBFAC,PO,25 | Performed by: OPHTHALMOLOGY

## 2020-01-03 PROCEDURE — 92136 OPHTHALMIC BIOMETRY: CPT | Mod: PBBFAC,PO,LT | Performed by: OPHTHALMOLOGY

## 2020-01-03 PROCEDURE — 92025 CPTRIZED CORNEAL TOPOGRAPHY: CPT | Mod: PBBFAC,PO | Performed by: OPHTHALMOLOGY

## 2020-01-03 RX ORDER — PHENYLEPHRINE HYDROCHLORIDE 100 MG/ML
1 SOLUTION/ DROPS OPHTHALMIC
Status: CANCELLED | OUTPATIENT
Start: 2020-01-03 | End: 2020-01-03

## 2020-01-03 RX ORDER — PREDNISOLONE ACETATE 10 MG/ML
1 SUSPENSION/ DROPS OPHTHALMIC 4 TIMES DAILY
Qty: 5 ML | Refills: 3 | Status: SHIPPED | OUTPATIENT
Start: 2020-01-17 | End: 2020-02-16

## 2020-01-03 RX ORDER — PHENYLEPHRINE HYDROCHLORIDE 25 MG/ML
1 SOLUTION/ DROPS OPHTHALMIC
Status: CANCELLED | OUTPATIENT
Start: 2020-01-03 | End: 2020-01-03

## 2020-01-03 RX ORDER — PROPARACAINE HYDROCHLORIDE 5 MG/ML
1 SOLUTION/ DROPS OPHTHALMIC
Status: CANCELLED | OUTPATIENT
Start: 2020-01-03 | End: 2020-01-03

## 2020-01-03 RX ORDER — TROPICAMIDE 10 MG/ML
1 SOLUTION/ DROPS OPHTHALMIC
Status: CANCELLED | OUTPATIENT
Start: 2020-01-03 | End: 2020-01-03

## 2020-01-03 RX ORDER — DICLOFENAC SODIUM 1 MG/ML
1 SOLUTION/ DROPS OPHTHALMIC 4 TIMES DAILY
Qty: 5 ML | Refills: 3 | Status: SHIPPED | OUTPATIENT
Start: 2020-01-13 | End: 2020-02-12

## 2020-01-03 RX ORDER — MOXIFLOXACIN 5 MG/ML
1 SOLUTION/ DROPS OPHTHALMIC 4 TIMES DAILY
Qty: 3 ML | Refills: 1 | Status: SHIPPED | OUTPATIENT
Start: 2020-01-17 | End: 2020-01-24

## 2020-01-03 NOTE — PROGRESS NOTES
HPI     Pre-op Exam      Additional comments: Pre-op exam for cataract surgery              Comments     Pt is here for pre-op for cataract surgery OS.     Gtts: Latanoprost QHS, Dorzolamide-timolol BID OU          Last edited by Cheryle Quintana on 1/3/2020  1:28 PM. (History)        ROS     Negative for: Constitutional, Gastrointestinal, Neurological, Skin,   Genitourinary, Musculoskeletal, HENT, Endocrine, Cardiovascular, Eyes,   Respiratory, Psychiatric, Allergic/Imm, Heme/Lymph    Last edited by Francis Francois Jr., MD on 1/3/2020  2:11 PM. (History)        Assessment /Plan     For exam results, see Encounter Report.    Age-related nuclear cataract of left eye  -     diclofenac (VOLTAREN) 0.1 % ophthalmic solution; Place 1 drop into the left eye 4 (four) times daily.  Dispense: 5 mL; Refill: 3  -     prednisoLONE acetate (PRED FORTE) 1 % DrpS; Place 1 drop into the left eye 4 (four) times daily.  Dispense: 5 mL; Refill: 3  -     moxifloxacin (VIGAMOX) 0.5 % ophthalmic solution; Place 1 drop into the left eye 4 (four) times daily. for 7 days  Dispense: 3 mL; Refill: 1  -     Case Request Operating Room: PHACOEMULSIFICATION, CATARACT  -     Computerized corneal topography  -     IOL Master - OS - Left Eye

## 2020-01-03 NOTE — H&P
CC: H&P for cataract surgery  HPI: Patient has been diagnosed with cataracts, which are interfering with daily activities due to blurred vision and glare which cannot adequately be corrected with glasses.   PMH:  Past Medical History:   Diagnosis Date    Arthritis     Cataract     OS    Episode of hypertension 2012    pt states she does not have, Maybe white coat syndrome    GERD (gastroesophageal reflux disease)     Glaucoma     suspect    Hyperlipidemia     Ocular hypertension     Spinal stenosis        FH:  Family History   Problem Relation Age of Onset    Diabetes Mother     Heart attack Mother     Cancer Maternal Aunt         ovarian    Heart attack Father     Arthritis Father     Heart disease Father          at age 68    Heart attack Sister     Heart attack Brother     Amblyopia Neg Hx     Blindness Neg Hx     Cataracts Neg Hx     Hypertension Neg Hx     Macular degeneration Neg Hx     Retinal detachment Neg Hx     Strabismus Neg Hx     Stroke Neg Hx     Thyroid disease Neg Hx     Glaucoma Neg Hx        SH:  Social History     Socioeconomic History    Marital status:      Spouse name: Not on file    Number of children: Not on file    Years of education: Not on file    Highest education level: Not on file   Occupational History    Not on file   Social Needs    Financial resource strain: Not on file    Food insecurity:     Worry: Not on file     Inability: Not on file    Transportation needs:     Medical: Not on file     Non-medical: Not on file   Tobacco Use    Smoking status: Never Smoker    Smokeless tobacco: Never Used   Substance and Sexual Activity    Alcohol use: Yes     Alcohol/week: 2.0 standard drinks     Types: 2 Glasses of wine per week    Drug use: No    Sexual activity: Not on file   Lifestyle    Physical activity:     Days per week: Not on file     Minutes per session: Not on file    Stress: Not on file   Relationships    Social  connections:     Talks on phone: Not on file     Gets together: Not on file     Attends Protestant service: Not on file     Active member of club or organization: Not on file     Attends meetings of clubs or organizations: Not on file     Relationship status: Not on file   Other Topics Concern    Not on file   Social History Narrative    Not on file       ROS:  HEENT: Blurred vision  CV: No chest pain  Pulm: No SOB  Please see my last exam note for additional ROS details    PE:   BP- 170/84 Pulse 79  HEENT: Cataract  CV: N S1 and S2 no murmurs  Pulm: CTAB wheezes, rales, or ronchi  Abd:  soft nabs NTND no masses  Extremeties: No edema    A/P  1. Cataract - Indications, risks and alternatives to the procedure were explained to the patient. The patient was given the opportunity to ask questions and consented to the procedure in writing.  Proceed with cataract surgery as scheduled.  2. Other health issues - patient has been cleared by their PCP. See last note for details.

## 2020-01-03 NOTE — H&P (VIEW-ONLY)
CC: H&P for cataract surgery  HPI: Patient has been diagnosed with cataracts, which are interfering with daily activities due to blurred vision and glare which cannot adequately be corrected with glasses.   PMH:  Past Medical History:   Diagnosis Date    Arthritis     Cataract     OS    Episode of hypertension 2012    pt states she does not have, Maybe white coat syndrome    GERD (gastroesophageal reflux disease)     Glaucoma     suspect    Hyperlipidemia     Ocular hypertension     Spinal stenosis        FH:  Family History   Problem Relation Age of Onset    Diabetes Mother     Heart attack Mother     Cancer Maternal Aunt         ovarian    Heart attack Father     Arthritis Father     Heart disease Father          at age 68    Heart attack Sister     Heart attack Brother     Amblyopia Neg Hx     Blindness Neg Hx     Cataracts Neg Hx     Hypertension Neg Hx     Macular degeneration Neg Hx     Retinal detachment Neg Hx     Strabismus Neg Hx     Stroke Neg Hx     Thyroid disease Neg Hx     Glaucoma Neg Hx        SH:  Social History     Socioeconomic History    Marital status:      Spouse name: Not on file    Number of children: Not on file    Years of education: Not on file    Highest education level: Not on file   Occupational History    Not on file   Social Needs    Financial resource strain: Not on file    Food insecurity:     Worry: Not on file     Inability: Not on file    Transportation needs:     Medical: Not on file     Non-medical: Not on file   Tobacco Use    Smoking status: Never Smoker    Smokeless tobacco: Never Used   Substance and Sexual Activity    Alcohol use: Yes     Alcohol/week: 2.0 standard drinks     Types: 2 Glasses of wine per week    Drug use: No    Sexual activity: Not on file   Lifestyle    Physical activity:     Days per week: Not on file     Minutes per session: Not on file    Stress: Not on file   Relationships    Social  connections:     Talks on phone: Not on file     Gets together: Not on file     Attends Orthodox service: Not on file     Active member of club or organization: Not on file     Attends meetings of clubs or organizations: Not on file     Relationship status: Not on file   Other Topics Concern    Not on file   Social History Narrative    Not on file       ROS:  HEENT: Blurred vision  CV: No chest pain  Pulm: No SOB  Please see my last exam note for additional ROS details    PE:   BP- 170/84 Pulse 79  HEENT: Cataract  CV: N S1 and S2 no murmurs  Pulm: CTAB wheezes, rales, or ronchi  Abd:  soft nabs NTND no masses  Extremeties: No edema    A/P  1. Cataract - Indications, risks and alternatives to the procedure were explained to the patient. The patient was given the opportunity to ask questions and consented to the procedure in writing.  Proceed with cataract surgery as scheduled.  2. Other health issues - patient has been cleared by their PCP. See last note for details.

## 2020-01-15 ENCOUNTER — ANESTHESIA EVENT (OUTPATIENT)
Dept: SURGERY | Facility: HOSPITAL | Age: 85
End: 2020-01-15
Payer: MEDICARE

## 2020-01-16 ENCOUNTER — HOSPITAL ENCOUNTER (OUTPATIENT)
Facility: HOSPITAL | Age: 85
Discharge: HOME | End: 2020-01-16
Attending: OPHTHALMOLOGY | Admitting: OPHTHALMOLOGY
Payer: MEDICARE

## 2020-01-16 ENCOUNTER — ANESTHESIA (OUTPATIENT)
Dept: SURGERY | Facility: HOSPITAL | Age: 85
End: 2020-01-16
Payer: MEDICARE

## 2020-01-16 VITALS
TEMPERATURE: 98 F | RESPIRATION RATE: 18 BRPM | OXYGEN SATURATION: 98 % | SYSTOLIC BLOOD PRESSURE: 156 MMHG | DIASTOLIC BLOOD PRESSURE: 72 MMHG | HEART RATE: 74 BPM | WEIGHT: 150 LBS | HEIGHT: 64 IN | BODY MASS INDEX: 25.61 KG/M2

## 2020-01-16 DIAGNOSIS — H25.12 AGE-RELATED NUCLEAR CATARACT OF LEFT EYE: Primary | ICD-10-CM

## 2020-01-16 PROCEDURE — 63600175 PHARM REV CODE 636 W HCPCS: Mod: PO | Performed by: NURSE ANESTHETIST, CERTIFIED REGISTERED

## 2020-01-16 PROCEDURE — 66984 XCAPSL CTRC RMVL W/O ECP: CPT | Mod: LT,,, | Performed by: OPHTHALMOLOGY

## 2020-01-16 PROCEDURE — 71000015 HC POSTOP RECOV 1ST HR: Mod: PO | Performed by: OPHTHALMOLOGY

## 2020-01-16 PROCEDURE — 36000706: Mod: PO | Performed by: OPHTHALMOLOGY

## 2020-01-16 PROCEDURE — 71000033 HC RECOVERY, INTIAL HOUR: Mod: PO | Performed by: OPHTHALMOLOGY

## 2020-01-16 PROCEDURE — 25000003 PHARM REV CODE 250: Mod: PO | Performed by: OPHTHALMOLOGY

## 2020-01-16 PROCEDURE — 25000003 PHARM REV CODE 250: Mod: PO

## 2020-01-16 PROCEDURE — D9220A PRA ANESTHESIA: ICD-10-PCS | Mod: ANES,,, | Performed by: ANESTHESIOLOGY

## 2020-01-16 PROCEDURE — D9220A PRA ANESTHESIA: ICD-10-PCS | Mod: CRNA,,, | Performed by: NURSE ANESTHETIST, CERTIFIED REGISTERED

## 2020-01-16 PROCEDURE — V2632 POST CHMBR INTRAOCULAR LENS: HCPCS | Mod: PO | Performed by: OPHTHALMOLOGY

## 2020-01-16 PROCEDURE — 37000009 HC ANESTHESIA EA ADD 15 MINS: Mod: PO | Performed by: OPHTHALMOLOGY

## 2020-01-16 PROCEDURE — 66984 PR REMOVAL, CATARACT, W/INSRT INTRAOC LENS, W/O ENDO CYCLO: ICD-10-PCS | Mod: LT,,, | Performed by: OPHTHALMOLOGY

## 2020-01-16 PROCEDURE — 63600175 PHARM REV CODE 636 W HCPCS: Mod: PO | Performed by: OPHTHALMOLOGY

## 2020-01-16 PROCEDURE — D9220A PRA ANESTHESIA: Mod: ANES,,, | Performed by: ANESTHESIOLOGY

## 2020-01-16 PROCEDURE — 37000008 HC ANESTHESIA 1ST 15 MINUTES: Mod: PO | Performed by: OPHTHALMOLOGY

## 2020-01-16 PROCEDURE — D9220A PRA ANESTHESIA: Mod: CRNA,,, | Performed by: NURSE ANESTHETIST, CERTIFIED REGISTERED

## 2020-01-16 PROCEDURE — 63600175 PHARM REV CODE 636 W HCPCS: Mod: PO | Performed by: ANESTHESIOLOGY

## 2020-01-16 PROCEDURE — 36000707: Mod: PO | Performed by: OPHTHALMOLOGY

## 2020-01-16 PROCEDURE — 25000003 PHARM REV CODE 250: Mod: PO | Performed by: NURSE ANESTHETIST, CERTIFIED REGISTERED

## 2020-01-16 DEVICE — LENS 19.0 ACRYSOF: Type: IMPLANTABLE DEVICE | Site: EYE | Status: FUNCTIONAL

## 2020-01-16 RX ORDER — FENTANYL CITRATE 50 UG/ML
25 INJECTION, SOLUTION INTRAMUSCULAR; INTRAVENOUS EVERY 5 MIN PRN
Status: DISCONTINUED | OUTPATIENT
Start: 2020-01-16 | End: 2020-01-16 | Stop reason: HOSPADM

## 2020-01-16 RX ORDER — PHENYLEPHRINE HYDROCHLORIDE 25 MG/ML
1 SOLUTION/ DROPS OPHTHALMIC
Status: ACTIVE | OUTPATIENT
Start: 2020-01-16 | End: 2020-01-16

## 2020-01-16 RX ORDER — PROPARACAINE HYDROCHLORIDE 5 MG/ML
1 SOLUTION/ DROPS OPHTHALMIC
Status: COMPLETED | OUTPATIENT
Start: 2020-01-16 | End: 2020-01-16

## 2020-01-16 RX ORDER — LIDOCAINE HYDROCHLORIDE 10 MG/ML
1 INJECTION, SOLUTION EPIDURAL; INFILTRATION; INTRACAUDAL; PERINEURAL ONCE
Status: DISCONTINUED | OUTPATIENT
Start: 2020-01-16 | End: 2020-01-16 | Stop reason: HOSPADM

## 2020-01-16 RX ORDER — LIDOCAINE HYDROCHLORIDE 10 MG/ML
INJECTION INFILTRATION; PERINEURAL
Status: DISCONTINUED | OUTPATIENT
Start: 2020-01-16 | End: 2020-01-16 | Stop reason: HOSPADM

## 2020-01-16 RX ORDER — PHENYLEPHRINE HYDROCHLORIDE 100 MG/ML
1 SOLUTION/ DROPS OPHTHALMIC
Status: COMPLETED | OUTPATIENT
Start: 2020-01-16 | End: 2020-01-16

## 2020-01-16 RX ORDER — OFLOXACIN 3 MG/ML
SOLUTION/ DROPS OPHTHALMIC
Status: DISCONTINUED | OUTPATIENT
Start: 2020-01-16 | End: 2020-01-16 | Stop reason: HOSPADM

## 2020-01-16 RX ORDER — ONDANSETRON 2 MG/ML
INJECTION INTRAMUSCULAR; INTRAVENOUS
Status: DISCONTINUED | OUTPATIENT
Start: 2020-01-16 | End: 2020-01-16

## 2020-01-16 RX ORDER — FENTANYL CITRATE 50 UG/ML
INJECTION, SOLUTION INTRAMUSCULAR; INTRAVENOUS
Status: DISCONTINUED | OUTPATIENT
Start: 2020-01-16 | End: 2020-01-16

## 2020-01-16 RX ORDER — TROPICAMIDE 10 MG/ML
1 SOLUTION/ DROPS OPHTHALMIC
Status: COMPLETED | OUTPATIENT
Start: 2020-01-16 | End: 2020-01-16

## 2020-01-16 RX ORDER — EPINEPHRINE 1 MG/ML
INJECTION INTRAMUSCULAR; INTRAVENOUS; SUBCUTANEOUS
Status: DISCONTINUED | OUTPATIENT
Start: 2020-01-16 | End: 2020-01-16 | Stop reason: HOSPADM

## 2020-01-16 RX ORDER — LIDOCAINE HYDROCHLORIDE 40 MG/ML
INJECTION, SOLUTION RETROBULBAR
Status: DISCONTINUED | OUTPATIENT
Start: 2020-01-16 | End: 2020-01-16

## 2020-01-16 RX ORDER — ACETAMINOPHEN 325 MG/1
650 TABLET ORAL EVERY 4 HOURS PRN
Qty: 8 TABLET | Refills: 0 | Status: SHIPPED | OUTPATIENT
Start: 2020-01-16 | End: 2020-01-19

## 2020-01-16 RX ORDER — SODIUM CHLORIDE, SODIUM LACTATE, POTASSIUM CHLORIDE, CALCIUM CHLORIDE 600; 310; 30; 20 MG/100ML; MG/100ML; MG/100ML; MG/100ML
INJECTION, SOLUTION INTRAVENOUS CONTINUOUS
Status: DISCONTINUED | OUTPATIENT
Start: 2020-01-16 | End: 2020-01-16 | Stop reason: HOSPADM

## 2020-01-16 RX ORDER — MIDAZOLAM HYDROCHLORIDE 1 MG/ML
INJECTION, SOLUTION INTRAMUSCULAR; INTRAVENOUS
Status: DISCONTINUED | OUTPATIENT
Start: 2020-01-16 | End: 2020-01-16

## 2020-01-16 RX ADMIN — LIDOCAINE HYDROCHLORIDE 4 DROP: 40 SOLUTION RETROBULBAR; TOPICAL at 07:01

## 2020-01-16 RX ADMIN — SODIUM CHLORIDE, SODIUM LACTATE, POTASSIUM CHLORIDE, AND CALCIUM CHLORIDE: .6; .31; .03; .02 INJECTION, SOLUTION INTRAVENOUS at 06:01

## 2020-01-16 RX ADMIN — TROPICAMIDE 1 DROP: 10 SOLUTION/ DROPS OPHTHALMIC at 06:01

## 2020-01-16 RX ADMIN — PROPARACAINE HYDROCHLORIDE 1 DROP: 5 SOLUTION/ DROPS OPHTHALMIC at 06:01

## 2020-01-16 RX ADMIN — PHENYLEPHRINE HYDROCHLORIDE 1 DROP: 100 SOLUTION/ DROPS OPHTHALMIC at 06:01

## 2020-01-16 RX ADMIN — MIDAZOLAM HYDROCHLORIDE 1 MG: 1 INJECTION, SOLUTION INTRAMUSCULAR; INTRAVENOUS at 07:01

## 2020-01-16 RX ADMIN — MIDAZOLAM HYDROCHLORIDE 1 MG: 1 INJECTION, SOLUTION INTRAMUSCULAR; INTRAVENOUS at 06:01

## 2020-01-16 RX ADMIN — FENTANYL CITRATE 25 MCG: 50 INJECTION, SOLUTION INTRAMUSCULAR; INTRAVENOUS at 07:01

## 2020-01-16 RX ADMIN — PHENYLEPHRINE HYDROCHLORIDE 1 DROP: 25 SOLUTION/ DROPS OPHTHALMIC at 06:01

## 2020-01-16 RX ADMIN — ONDANSETRON 4 MG: 2 INJECTION, SOLUTION INTRAMUSCULAR; INTRAVENOUS at 07:01

## 2020-01-16 RX ADMIN — LIDOCAINE HYDROCHLORIDE 5 DROP: 40 SOLUTION RETROBULBAR; TOPICAL at 07:01

## 2020-01-16 NOTE — TRANSFER OF CARE
"Anesthesia Transfer of Care Note    Patient: Libby Estrada    Procedure(s) Performed: Procedure(s) (LRB):  PHACOEMULSIFICATION, CATARACT (Left)    Patient location: PACU    Anesthesia Type: MAC    Transport from OR: Transported from OR on room air with adequate spontaneous ventilation    Post pain: adequate analgesia    Post assessment: no apparent anesthetic complications    Post vital signs: stable    Level of consciousness: awake, alert and oriented    Nausea/Vomiting: no nausea/vomiting    Complications: none    Transfer of care protocol was followed      Last vitals:   Visit Vitals  BP (!) 180/78   Pulse 94   Temp 36.6 °C (97.8 °F) (Skin)   Resp 18   Ht 5' 4" (1.626 m)   Wt 68 kg (150 lb)   LMP  (LMP Unknown)   SpO2 98%   Breastfeeding? No   BMI 25.75 kg/m²     "

## 2020-01-16 NOTE — OP NOTE
Date of surgery: 1/16/2020    Operative Report    Preoperative Diagnosis: Nuclear sclerosis, left eye    Postoperative Diagnosis: Nuclear sclerosis, left eye    Procedure: Phacoemulsification with posterior chamber intraocular lens, left eye    Surgeon: Francis Francois Jr. M.D.    Anesthesia: MAC with topical     Brief Preoperative Note:  The patient was seen in the office with cataract of the left eye.  Because of the impairment of the patient's reading, driving, ambulation, and other activities of daily living needing a good quality of vision, the patient elected to remove the cataract and place a acrylic lens implant, if possible    Procedure in detail:    Informed consent was obtained. Indications, risks and alternatives to the procedure were explained to the patient. The patient was given the opportunity to ask questions and consented to the procedure in writing. In the preoperative holding area, the patients identity and operative site were confirmed.  Topical anesthetic was administered, consisting of alternating 0.5% Proparacaine and 4% preservative-free Lidocaine Q 5 minutes times 3.  The patient was taken to the operative suite.  A time-out was performed.  The left eye was prepped with 5% Betadine and draped in sterile fashion.  A lid speculum was placed in the left eye.  The temporal clear corneal incision was developed using a LRI dillon knife and crescent blade for a 3 plane incision.  A paracentesis was done with a 1mm dillon blade.  Before instillation of the Viscoat, approximately 0.1 to 0.3cc of diluted preservative free intracameral lidocaine was instilled.  Viscoat was injected into the anterior chamber with a 27-gauge needle.  A 2.4mm dillon blade was used to make a temporal clear corneal incision.  Afterwards, a cystotome was used to perform a continuous curvilinear capsulorrhexis with the assistance of utrata forceps.  Hydrodissection was performed using balanced salt solution,injected  under the anterior capsule using a lafleur cannula and hydrodelineation was performed using a blunt-tipped cannula.  Next, phacoemulsification performed in a divide and conquer fashion with the use of a nucleus rotator to turn the lens and protect the posterior capsule.  Cortical material was then removed using irrigation and aspiration.  Healon was then injected into the anterior chamber and into capsular bag to inflate the bag for the placement of the lens implant and then an Reji SN60WF 19.0 D lens was injected into the capsular bag and rotated in position with the assistance of a Sinsky hook.  Irrigation and aspiration were used to remove the remaining viscoelastic. Next, Miochol was injected into the anterior chamber and the pupil was allowed to constrict in a symmetrical fashion.  Wound hydrated with BSS. A collagen shield was placed into the eye and the eye was covered with a Salazar shield.  The patient tolerated the procedure well and was transferred to the recovery area in good condition.  Estimated blood loss:  none  Specimens:   none  Complications:  none  Disposition:   stable to PACU

## 2020-01-16 NOTE — ANESTHESIA PREPROCEDURE EVALUATION
01/16/2020  Libby Estrada is a 85 y.o., female.    Anesthesia Evaluation    I have reviewed the Patient Summary Reports.    I have reviewed the Nursing Notes.   I have reviewed the Medications.     Review of Systems  Cardiovascular:   Exercise tolerance: good Hypertension, well controlled    Pulmonary:  Pulmonary Normal    Hepatic/GI:   GERD    Musculoskeletal:   Spinal stenosis, pain Spine Disorders: lumbar    Psych:  Psychiatric Normal           Physical Exam  General:  Well nourished    Airway/Jaw/Neck:  Airway Findings: Mouth Opening: Normal Tongue: Normal  General Airway Assessment: Adult  Mallampati: III  Improves to II with phonation.      Dental:  Dental Findings: In tact   Chest/Lungs:  Chest/Lungs Findings: Clear to auscultation, Normal Respiratory Rate     Heart/Vascular:  Heart Findings: Rate: Normal  Rhythm: Regular Rhythm  Sounds: Normal        Mental Status:  Mental Status Findings:  Cooperative, Alert and Oriented         Anesthesia Plan  Type of Anesthesia, risks & benefits discussed:  Anesthesia Type:  MAC  Patient's Preference:   Intra-op Monitoring Plan: standard ASA monitors  Intra-op Monitoring Plan Comments:   Post Op Pain Control Plan: multimodal analgesia  Post Op Pain Control Plan Comments:   Induction:   IV  Beta Blocker:  Patient is not currently on a Beta-Blocker (No further documentation required).       Informed Consent: Patient understands risks and agrees with Anesthesia plan.  Questions answered. Anesthesia consent signed with patient.  ASA Score: 2     Day of Surgery Review of History & Physical:            Ready For Surgery From Anesthesia Perspective.

## 2020-01-16 NOTE — ANESTHESIA POSTPROCEDURE EVALUATION
Anesthesia Post Evaluation    Patient: Libby Estrada    Procedure(s) Performed: Procedure(s) (LRB):  PHACOEMULSIFICATION, CATARACT (Left)    Final Anesthesia Type: MAC    Patient location during evaluation: PACU  Patient participation: Yes- Able to Participate  Level of consciousness: awake and alert  Post-procedure vital signs: reviewed and stable  Pain management: adequate  Airway patency: patent    PONV status at discharge: No PONV  Anesthetic complications: no      Cardiovascular status: blood pressure returned to baseline and hypertensive  Respiratory status: spontaneous ventilation  Hydration status: euvolemic  Follow-up not needed.          Vitals Value Taken Time   /72 1/16/2020  8:00 AM   Temp 36.4 °C (97.5 °F) 1/16/2020  7:47 AM   Pulse 74 1/16/2020  8:00 AM   Resp 18 1/16/2020  8:00 AM   SpO2 98 % 1/16/2020  8:00 AM         Event Time     Out of Recovery 07:48:00          Pain/Jem Score: Jem Score: 10 (1/16/2020  7:47 AM)

## 2020-01-16 NOTE — BRIEF OP NOTE
Operative Note     SUMMARY     Surgery Date: 1/16/2020     Surgeon(s) and Role:     * Francis Francois Jr., MD - Primary    Pre-op Diagnosis:  Age-related nuclear cataract of left eye [H25.12]    Post-op Diagnosis:  Age-related nuclear cataract of left eye [H25.12]    Procedure(s) (LRB):  PHACOEMULSIFICATION, CATARACT (Left)    Anesthesia: Monitor Anesthesia Care    Findings/Key Components:  Same as post op diagnosis    Estimated Blood Loss: * No values recorded between 1/16/2020  7:19 AM and 1/16/2020  7:47 AM *         Specimens (From admission, onward)    None            Discharge Note      SUMMARY     Admit Date: 1/16/2020    Attending Physician: Francis Francois Jr., MD     Discharge Physician: Francis Francois Jr., MD    Discharge Date: 1/16/2020     Final Diagnosis: Age-related nuclear cataract of left eye [H25.12]    Hospital Course: The patient was admitted for outpatient surgery and tolerated the procedure well. The patient was discharged home in stable condition the same day.    Diet: Advance as tolerated    Follow-Up: Follow up with Dr. Francois, next day, as previously scheduled  Activity as tolerated.      Activity: Per Handout Instructions    Disposition: Home or self care    Patient Instructions:   Current Discharge Medication List      START taking these medications    Details   acetaminophen (TYLENOL) 325 MG tablet Take 2 tablets (650 mg total) by mouth every 4 (four) hours as needed for Pain.  Qty: 8 tablet, Refills: 0         CONTINUE these medications which have NOT CHANGED    Details   aspirin (ECOTRIN) 81 MG EC tablet Take 81 mg by mouth once daily.        diclofenac (VOLTAREN) 0.1 % ophthalmic solution Place 1 drop into the left eye 4 (four) times daily.  Qty: 5 mL, Refills: 3    Associated Diagnoses: Age-related nuclear cataract of left eye      dorzolamide-timolol 2-0.5% (COSOPT) 22.3-6.8 mg/mL ophthalmic solution INSTILL 1 DROP INTO BOTH EYES TWICE A DAY  Qty: 30 mL, Refills: 1       ezetimibe (ZETIA) 10 mg tablet Take 1 tablet (10 mg total) by mouth once daily.  Qty: 90 tablet, Refills: 3      gabapentin (NEURONTIN) 300 MG capsule Take 300 mg by mouth 3 (three) times daily.   Refills: 2      ibuprofen (ADVIL,MOTRIN) 400 MG tablet Take 400 mg by mouth every 6 (six) hours as needed for Other. Not sure of mg      latanoprost 0.005 % ophthalmic solution PLACE 1 DROP INTO BOTH EYES EVERY EVENING. Ok to dispense 90 day supply  Qty: 2.5 mL, Refills: 12    Associated Diagnoses: Borderline glaucoma of both eyes with ocular hypertension      moxifloxacin (VIGAMOX) 0.5 % ophthalmic solution Place 1 drop into the left eye 4 (four) times daily. for 7 days  Qty: 3 mL, Refills: 1    Associated Diagnoses: Age-related nuclear cataract of left eye      MULTIVITAMIN W-MINERALS/LUTEIN (CENTRUM SILVER ORAL) Take by mouth.      prednisoLONE acetate (PRED FORTE) 1 % DrpS Place 1 drop into the left eye 4 (four) times daily.  Qty: 5 mL, Refills: 3    Associated Diagnoses: Age-related nuclear cataract of left eye      vitamin D 1000 units Tab Take 1,000 Units by mouth once daily.             Discharge Procedure Orders (must include Diet, Follow-up, Activity)   Discharge Procedure Orders (must include Diet, Follow-up, Activity)   Diet general     Lifting restrictions     Call MD for:  persistent nausea and vomiting     Call MD for:  severe uncontrolled pain     Leave dressing on - Keep it clean, dry, and intact until clinic visit     Activity as tolerated

## 2020-01-17 ENCOUNTER — OFFICE VISIT (OUTPATIENT)
Dept: OPHTHALMOLOGY | Facility: CLINIC | Age: 85
End: 2020-01-17
Payer: MEDICARE

## 2020-01-17 DIAGNOSIS — Z96.1 STATUS POST CATARACT EXTRACTION AND INSERTION OF INTRAOCULAR LENS OF LEFT EYE: Primary | ICD-10-CM

## 2020-01-17 DIAGNOSIS — Z98.42 STATUS POST CATARACT EXTRACTION AND INSERTION OF INTRAOCULAR LENS OF LEFT EYE: Primary | ICD-10-CM

## 2020-01-17 PROCEDURE — 99024 PR POST-OP FOLLOW-UP VISIT: ICD-10-PCS | Mod: POP,,, | Performed by: OPHTHALMOLOGY

## 2020-01-17 PROCEDURE — 99999 PR PBB SHADOW E&M-EST. PATIENT-LVL II: ICD-10-PCS | Mod: PBBFAC,,, | Performed by: OPHTHALMOLOGY

## 2020-01-17 PROCEDURE — 99212 OFFICE O/P EST SF 10 MIN: CPT | Mod: PBBFAC,PO | Performed by: OPHTHALMOLOGY

## 2020-01-17 PROCEDURE — 99999 PR PBB SHADOW E&M-EST. PATIENT-LVL II: CPT | Mod: PBBFAC,,, | Performed by: OPHTHALMOLOGY

## 2020-01-17 PROCEDURE — 99024 POSTOP FOLLOW-UP VISIT: CPT | Mod: POP,,, | Performed by: OPHTHALMOLOGY

## 2020-01-17 NOTE — PROGRESS NOTES
HPI     Post-op Evaluation      Additional comments: 1 day s/p phaco iol OS 1/16              Comments     Pt states no pain but has some itching.     Gtts: Prednisolone, Diclofenac, Moxifloxacin QID OS, Installed 1 drop into   OS at 9:06am          Last edited by Cheryle Quintana on 1/17/2020  9:07 AM. (History)        ROS     Negative for: Constitutional, Gastrointestinal, Neurological, Skin,   Genitourinary, Musculoskeletal, HENT, Endocrine, Cardiovascular, Eyes,   Respiratory, Psychiatric, Allergic/Imm, Heme/Lymph    Last edited by Francis Francois Jr., MD on 1/17/2020  9:16 AM. (History)        Assessment /Plan     For exam results, see Encounter Report.    Status post cataract extraction and insertion of intraocular lens of left eye    Prednisolone acetate (pink or white top drop) 1 drop 4x daily left eye; shake well before using drop  Vigamox 1 drop 4x daily left eye (tan top)  Diclofenac 1 drop 4x daily left eye (gray top)  No bending no lifting  Keep head elevated when laying down  Keep dry   F/u 1 week

## 2020-01-22 ENCOUNTER — OFFICE VISIT (OUTPATIENT)
Dept: OPHTHALMOLOGY | Facility: CLINIC | Age: 85
End: 2020-01-22
Payer: MEDICARE

## 2020-01-22 DIAGNOSIS — Z98.42 STATUS POST CATARACT EXTRACTION AND INSERTION OF INTRAOCULAR LENS OF LEFT EYE: Primary | ICD-10-CM

## 2020-01-22 DIAGNOSIS — Z96.1 STATUS POST CATARACT EXTRACTION AND INSERTION OF INTRAOCULAR LENS OF LEFT EYE: Primary | ICD-10-CM

## 2020-01-22 PROCEDURE — 99024 POSTOP FOLLOW-UP VISIT: CPT | Mod: POP,,, | Performed by: OPHTHALMOLOGY

## 2020-01-22 PROCEDURE — 99999 PR PBB SHADOW E&M-EST. PATIENT-LVL II: CPT | Mod: PBBFAC,,, | Performed by: OPHTHALMOLOGY

## 2020-01-22 PROCEDURE — 99212 OFFICE O/P EST SF 10 MIN: CPT | Mod: PBBFAC,PO | Performed by: OPHTHALMOLOGY

## 2020-01-22 PROCEDURE — 99999 PR PBB SHADOW E&M-EST. PATIENT-LVL II: ICD-10-PCS | Mod: PBBFAC,,, | Performed by: OPHTHALMOLOGY

## 2020-01-22 PROCEDURE — 99024 PR POST-OP FOLLOW-UP VISIT: ICD-10-PCS | Mod: POP,,, | Performed by: OPHTHALMOLOGY

## 2020-01-22 NOTE — PROGRESS NOTES
HPI     DLE- 1/17/20     Pt is here for 1 week post op sp phaco IOL OS done on 1/16.     Pt states va is doing well, denies eye pain. Pt uses gtts as directed Pred   x4, diclo x4, and viga x4 OS. Denies flashes or floaters since sx.     Last edited by Jake Tam on 1/22/2020  4:00 PM. (History)        ROS     Negative for: Constitutional, Gastrointestinal, Neurological, Skin,   Genitourinary, Musculoskeletal, HENT, Endocrine, Cardiovascular, Eyes,   Respiratory, Psychiatric, Allergic/Imm, Heme/Lymph    Last edited by Francis Francois Jr., MD on 1/22/2020  4:21 PM. (History)        Assessment /Plan     For exam results, see Encounter Report.    Status post cataract extraction and insertion of intraocular lens of left eye    Stop Moxifloxacin  Decrease PF and Diclofenac 1x daily left  Follow up in about 3 weeks (around 2/12/2020) for POM #1 visit cataract surgery.

## 2020-01-26 ENCOUNTER — NURSE TRIAGE (OUTPATIENT)
Dept: ADMINISTRATIVE | Facility: CLINIC | Age: 85
End: 2020-01-26

## 2020-01-26 NOTE — TELEPHONE ENCOUNTER
Reason for Disposition   Cough has been present for > 3 weeks    Additional Information   Negative: Severe difficulty breathing (e.g., struggling for each breath, speaks in single words)   Negative: Bluish (or gray) lips or face now   Negative: [1] Difficulty breathing AND [2] exposure to flames, smoke, or fumes   Negative: [1] Stridor AND [2] difficulty breathing   Negative: Sounds like a life-threatening emergency to the triager   Negative: [1] Previous asthma attacks AND [2] this feels like asthma attack   Negative: Dry (non-productive) cough (i.e., no sputum or minimal clear sputum)   Negative: Chest pain  (Exception: MILD central chest pain, present only when coughing)   Negative: Difficulty breathing   Negative: Patient sounds very sick or weak to the triager   Negative: [1] Coughed up blood AND [2] > 1 tablespoon (15 ml) (Exception: blood-tinged sputum)   Negative: Fever > 103 F (39.4 C)   Negative: [1] Fever > 101 F (38.3 C) AND [2] age > 60   Negative: [1] Fever > 100.0 F (37.8 C) AND [2] bedridden (e.g., nursing home patient, CVA, chronic illness, recovering from surgery)   Negative: [1] Fever > 100.0 F (37.8 C) AND [2] diabetes mellitus or weak immune system (e.g., HIV positive, cancer chemo, splenectomy, chronic steroids)   Negative: Wheezing is present   Negative: SEVERE coughing spells (e.g., whooping sound after coughing, vomiting after coughing)   Negative: [1] Continuous (nonstop) coughing interferes with work or school AND [2] no improvement using cough treatment per Care Advice   Negative: Coughing up frank-colored (reddish-brown) sputum   Negative: Fever present > 3 days (72 hours)   Negative: [1] Fever returns after gone for over 24 hours AND [2] symptoms worse or not improved   Negative: [1] Using nasal washes and pain medicine > 24 hours AND [2] sinus pain (around cheekbone or eye) persists   Negative: Earache   Negative: [1] Known COPD or other severe lung disease  (i.e., bronchiectasis, cystic fibrosis, lung surgery) AND [2] worsening symptoms (i.e., increased sputum purulence or amount, increased breathing difficulty    Protocols used: COUGH - ACUTE [id number]    Pt stated she has a productive cough with green sputum that comes and goes for over 3 weeks. Per triage protocol, advised to see a Physician within 2-3 days. Pt verbalized understanding.

## 2020-01-27 NOTE — TELEPHONE ENCOUNTER
Pt c/o headache, ear fullness, cough in the morning with green phlegm. Appt scheduled for tomorrow with Cristine HATHAWAY.

## 2020-01-28 ENCOUNTER — OFFICE VISIT (OUTPATIENT)
Dept: FAMILY MEDICINE | Facility: CLINIC | Age: 85
End: 2020-01-28
Payer: MEDICARE

## 2020-01-28 VITALS
WEIGHT: 157.44 LBS | HEIGHT: 64 IN | BODY MASS INDEX: 26.88 KG/M2 | OXYGEN SATURATION: 96 % | TEMPERATURE: 98 F | DIASTOLIC BLOOD PRESSURE: 80 MMHG | SYSTOLIC BLOOD PRESSURE: 138 MMHG | HEART RATE: 88 BPM

## 2020-01-28 DIAGNOSIS — J30.2 SEASONAL ALLERGIC RHINITIS, UNSPECIFIED TRIGGER: Primary | ICD-10-CM

## 2020-01-28 PROCEDURE — 1126F AMNT PAIN NOTED NONE PRSNT: CPT | Mod: ,,, | Performed by: PHYSICIAN ASSISTANT

## 2020-01-28 PROCEDURE — 99214 OFFICE O/P EST MOD 30 MIN: CPT | Mod: PBBFAC,PO | Performed by: PHYSICIAN ASSISTANT

## 2020-01-28 PROCEDURE — 1159F PR MEDICATION LIST DOCUMENTED IN MEDICAL RECORD: ICD-10-PCS | Mod: ,,, | Performed by: PHYSICIAN ASSISTANT

## 2020-01-28 PROCEDURE — 99213 OFFICE O/P EST LOW 20 MIN: CPT | Mod: S$PBB,,, | Performed by: PHYSICIAN ASSISTANT

## 2020-01-28 PROCEDURE — 99213 PR OFFICE/OUTPT VISIT, EST, LEVL III, 20-29 MIN: ICD-10-PCS | Mod: S$PBB,,, | Performed by: PHYSICIAN ASSISTANT

## 2020-01-28 PROCEDURE — 99999 PR PBB SHADOW E&M-EST. PATIENT-LVL IV: CPT | Mod: PBBFAC,,, | Performed by: PHYSICIAN ASSISTANT

## 2020-01-28 PROCEDURE — 1159F MED LIST DOCD IN RCRD: CPT | Mod: ,,, | Performed by: PHYSICIAN ASSISTANT

## 2020-01-28 PROCEDURE — 1126F PR PAIN SEVERITY QUANTIFIED, NO PAIN PRESENT: ICD-10-PCS | Mod: ,,, | Performed by: PHYSICIAN ASSISTANT

## 2020-01-28 PROCEDURE — 99999 PR PBB SHADOW E&M-EST. PATIENT-LVL IV: ICD-10-PCS | Mod: PBBFAC,,, | Performed by: PHYSICIAN ASSISTANT

## 2020-01-28 NOTE — PATIENT INSTRUCTIONS
Start taking Children's dosing of Zyrtec nightly.  Increase fluids.    Thanks for seeing me,  Cristine Hidalgo PA-C

## 2020-01-28 NOTE — PROGRESS NOTES
"Subjective:      Patient ID: Libby Estrada is a 85 y.o. female.    Chief Complaint: Otalgia  Patient is new to me.    HPI   Patient has been having ear fullness and headaches for a couple months.  Taken omar seltzer plus with some relief.  Not taking antihistamine daily.    Review of Systems   Constitutional: Negative for appetite change, chills and fever.   HENT: Positive for ear pain (fullness), postnasal drip, sinus pressure and sinus pain (left frontal).    Respiratory: Positive for cough (mild green sputum). Negative for shortness of breath.    Cardiovascular: Negative for chest pain.   Gastrointestinal: Negative for abdominal pain, constipation, diarrhea, nausea and vomiting.   Skin: Negative for rash.   Allergic/Immunologic: Positive for environmental allergies.   Neurological: Positive for dizziness (gabapentin), light-headedness and headaches (intermittent left frontal).       Objective:   /80   Pulse 88   Temp 98.3 °F (36.8 °C) (Oral)   Ht 5' 4" (1.626 m)   Wt 71.4 kg (157 lb 6.5 oz)   LMP  (LMP Unknown)   SpO2 96%   BMI 27.02 kg/m²      Physical Exam   Constitutional: She is oriented to person, place, and time. Vital signs are normal. She appears well-developed and well-nourished. She is active and cooperative. No distress.   HENT:   Head: Normocephalic and atraumatic.   Right Ear: Hearing, tympanic membrane, external ear and ear canal normal.   Left Ear: Hearing, tympanic membrane, external ear and ear canal normal.   Nose: Right sinus exhibits no maxillary sinus tenderness and no frontal sinus tenderness. Left sinus exhibits no maxillary sinus tenderness and no frontal sinus tenderness.   Mouth/Throat: Posterior oropharyngeal erythema (slight suggestive of post nasal drip) present.   Eyes: Conjunctivae and lids are normal.   Neck: Normal range of motion and phonation normal. Neck supple.   Cardiovascular: Normal rate, regular rhythm and normal heart sounds. Exam reveals no gallop and no " friction rub.   No murmur heard.  Pulmonary/Chest: Effort normal and breath sounds normal. No stridor. No respiratory distress. She has no decreased breath sounds. She has no wheezes. She has no rhonchi. She has no rales.   Musculoskeletal: Normal range of motion.   Lymphadenopathy:        Head (right side): No submental, no submandibular, no tonsillar, no preauricular, no posterior auricular and no occipital adenopathy present.        Head (left side): No submental, no submandibular, no tonsillar, no preauricular, no posterior auricular and no occipital adenopathy present.     She has no cervical adenopathy.   Neurological: She is alert and oriented to person, place, and time.   Skin: Skin is warm, dry and intact. No rash noted.   Psychiatric: She has a normal mood and affect. Her speech is normal and behavior is normal. Judgment and thought content normal.   Vitals reviewed.    Assessment:      1. Seasonal allergic rhinitis, unspecified trigger       Plan:   1. Seasonal allergic rhinitis, unspecified trigger  Start taking Children's dosing of Zyrtec nightly.  Increase fluids.    Follow up as needed.  Patient agreed with plan and expressed understanding.

## 2020-01-31 ENCOUNTER — EXTERNAL CHRONIC CARE MANAGEMENT (OUTPATIENT)
Dept: PRIMARY CARE CLINIC | Facility: CLINIC | Age: 85
End: 2020-01-31
Payer: MEDICARE

## 2020-01-31 PROCEDURE — 99490 CHRNC CARE MGMT STAFF 1ST 20: CPT | Mod: PBBFAC,PO | Performed by: FAMILY MEDICINE

## 2020-01-31 PROCEDURE — 99490 CHRNC CARE MGMT STAFF 1ST 20: CPT | Mod: S$PBB,,, | Performed by: FAMILY MEDICINE

## 2020-01-31 PROCEDURE — 99490 PR CHRONIC CARE MGMT, 1ST 20 MIN: ICD-10-PCS | Mod: S$PBB,,, | Performed by: FAMILY MEDICINE

## 2020-02-12 ENCOUNTER — IMMUNIZATION (OUTPATIENT)
Dept: PHARMACY | Facility: CLINIC | Age: 85
End: 2020-02-12
Payer: MEDICARE

## 2020-02-12 ENCOUNTER — OFFICE VISIT (OUTPATIENT)
Dept: OPHTHALMOLOGY | Facility: CLINIC | Age: 85
End: 2020-02-12
Payer: MEDICARE

## 2020-02-12 DIAGNOSIS — Z98.42 STATUS POST CATARACT EXTRACTION AND INSERTION OF INTRAOCULAR LENS OF LEFT EYE: Primary | ICD-10-CM

## 2020-02-12 DIAGNOSIS — Z96.1 STATUS POST CATARACT EXTRACTION AND INSERTION OF INTRAOCULAR LENS OF LEFT EYE: Primary | ICD-10-CM

## 2020-02-12 PROCEDURE — 99024 PR POST-OP FOLLOW-UP VISIT: ICD-10-PCS | Mod: POP,,, | Performed by: OPHTHALMOLOGY

## 2020-02-12 PROCEDURE — 99999 PR PBB SHADOW E&M-EST. PATIENT-LVL II: CPT | Mod: PBBFAC,,, | Performed by: OPHTHALMOLOGY

## 2020-02-12 PROCEDURE — 99999 PR PBB SHADOW E&M-EST. PATIENT-LVL II: ICD-10-PCS | Mod: PBBFAC,,, | Performed by: OPHTHALMOLOGY

## 2020-02-12 PROCEDURE — 99212 OFFICE O/P EST SF 10 MIN: CPT | Mod: PBBFAC,PO | Performed by: OPHTHALMOLOGY

## 2020-02-12 PROCEDURE — 99024 POSTOP FOLLOW-UP VISIT: CPT | Mod: POP,,, | Performed by: OPHTHALMOLOGY

## 2020-02-12 NOTE — PROGRESS NOTES
HPI     DLE- 1/22/20     Post op 1 month sp phaco iol OS done 1/16. Pt states va is doing well.   Denies eye pain. Pt states she will finish her drops tomorrow. Pt has been   taking Pred x1 in OS and Diclo x1 in OS. Pt is still taking glaucoma drops   as directed. Denies flashes or floaters in OS since sx.     Last edited by Jake Tam on 2/12/2020  1:21 PM. (History)            Assessment /Plan     For exam results, see Encounter Report.    Status post cataract extraction and insertion of intraocular lens of left eye    satisfactory course, doing well  D/c drops   Continue with cosopt bid ou and latanoprost qhs ou  Follow up in about 4 months (around 6/12/2020) for IOP and Medication check.

## 2020-02-18 RX ORDER — DORZOLAMIDE HYDROCHLORIDE AND TIMOLOL MALEATE 20; 5 MG/ML; MG/ML
SOLUTION/ DROPS OPHTHALMIC
Qty: 30 ML | Refills: 4 | Status: SHIPPED | OUTPATIENT
Start: 2020-02-18 | End: 2021-03-22

## 2020-02-29 ENCOUNTER — EXTERNAL CHRONIC CARE MANAGEMENT (OUTPATIENT)
Dept: PRIMARY CARE CLINIC | Facility: CLINIC | Age: 85
End: 2020-02-29
Payer: MEDICARE

## 2020-02-29 PROCEDURE — 99490 CHRNC CARE MGMT STAFF 1ST 20: CPT | Mod: PBBFAC,PO | Performed by: FAMILY MEDICINE

## 2020-02-29 PROCEDURE — 99490 PR CHRONIC CARE MGMT, 1ST 20 MIN: ICD-10-PCS | Mod: S$PBB,,, | Performed by: FAMILY MEDICINE

## 2020-02-29 PROCEDURE — 99490 CHRNC CARE MGMT STAFF 1ST 20: CPT | Mod: S$PBB,,, | Performed by: FAMILY MEDICINE

## 2020-03-31 ENCOUNTER — EXTERNAL CHRONIC CARE MANAGEMENT (OUTPATIENT)
Dept: PRIMARY CARE CLINIC | Facility: CLINIC | Age: 85
End: 2020-03-31
Payer: MEDICARE

## 2020-03-31 PROCEDURE — 99490 CHRNC CARE MGMT STAFF 1ST 20: CPT | Mod: PBBFAC,PO | Performed by: FAMILY MEDICINE

## 2020-03-31 PROCEDURE — 99490 PR CHRONIC CARE MGMT, 1ST 20 MIN: ICD-10-PCS | Mod: S$PBB,,, | Performed by: FAMILY MEDICINE

## 2020-03-31 PROCEDURE — 99490 CHRNC CARE MGMT STAFF 1ST 20: CPT | Mod: S$PBB,,, | Performed by: FAMILY MEDICINE

## 2020-04-30 ENCOUNTER — EXTERNAL CHRONIC CARE MANAGEMENT (OUTPATIENT)
Dept: PRIMARY CARE CLINIC | Facility: CLINIC | Age: 85
End: 2020-04-30
Payer: MEDICARE

## 2020-04-30 PROCEDURE — 99490 CHRNC CARE MGMT STAFF 1ST 20: CPT | Mod: PBBFAC,PO | Performed by: FAMILY MEDICINE

## 2020-04-30 PROCEDURE — 99490 CHRNC CARE MGMT STAFF 1ST 20: CPT | Mod: S$PBB,,, | Performed by: FAMILY MEDICINE

## 2020-04-30 PROCEDURE — 99490 PR CHRONIC CARE MGMT, 1ST 20 MIN: ICD-10-PCS | Mod: S$PBB,,, | Performed by: FAMILY MEDICINE

## 2020-05-21 ENCOUNTER — IMMUNIZATION (OUTPATIENT)
Dept: PHARMACY | Facility: CLINIC | Age: 85
End: 2020-05-21
Payer: MEDICARE

## 2020-05-31 ENCOUNTER — EXTERNAL CHRONIC CARE MANAGEMENT (OUTPATIENT)
Dept: PRIMARY CARE CLINIC | Facility: CLINIC | Age: 85
End: 2020-05-31
Payer: MEDICARE

## 2020-05-31 PROCEDURE — 99490 CHRNC CARE MGMT STAFF 1ST 20: CPT | Mod: PBBFAC,PO | Performed by: FAMILY MEDICINE

## 2020-05-31 PROCEDURE — 99490 CHRNC CARE MGMT STAFF 1ST 20: CPT | Mod: S$PBB,,, | Performed by: FAMILY MEDICINE

## 2020-05-31 PROCEDURE — 99490 PR CHRONIC CARE MGMT, 1ST 20 MIN: ICD-10-PCS | Mod: S$PBB,,, | Performed by: FAMILY MEDICINE

## 2020-06-16 ENCOUNTER — OFFICE VISIT (OUTPATIENT)
Dept: OPHTHALMOLOGY | Facility: CLINIC | Age: 85
End: 2020-06-16
Payer: MEDICARE

## 2020-06-16 DIAGNOSIS — H40.053 BORDERLINE GLAUCOMA OF BOTH EYES WITH OCULAR HYPERTENSION: Primary | ICD-10-CM

## 2020-06-16 DIAGNOSIS — Z96.1 PSEUDOPHAKIA: ICD-10-CM

## 2020-06-16 DIAGNOSIS — H52.7 REFRACTIVE ERROR: ICD-10-CM

## 2020-06-16 PROCEDURE — 99999 PR PBB SHADOW E&M-EST. PATIENT-LVL III: ICD-10-PCS | Mod: PBBFAC,,, | Performed by: OPHTHALMOLOGY

## 2020-06-16 PROCEDURE — 99213 OFFICE O/P EST LOW 20 MIN: CPT | Mod: PBBFAC,PO | Performed by: OPHTHALMOLOGY

## 2020-06-16 PROCEDURE — 92012 INTRM OPH EXAM EST PATIENT: CPT | Mod: S$PBB,,, | Performed by: OPHTHALMOLOGY

## 2020-06-16 PROCEDURE — 92012 PR EYE EXAM, EST PATIENT,INTERMED: ICD-10-PCS | Mod: S$PBB,,, | Performed by: OPHTHALMOLOGY

## 2020-06-16 PROCEDURE — 99999 PR PBB SHADOW E&M-EST. PATIENT-LVL III: CPT | Mod: PBBFAC,,, | Performed by: OPHTHALMOLOGY

## 2020-06-16 NOTE — PROGRESS NOTES
HPI     DLE_ 2/12/20     Pt is here for 4 month IOP ck. Pt states, no changes since last seen. Pt   states she notices an occasional blur in OD but states she does not want   to do anything further about it. Pt is currently taking Cosopt BID OU and   Latanoprost QHS OU. Denies flashes or floaters.     Last edited by Jake Tam on 6/16/2020  8:20 AM. (History)        ROS     Negative for: Constitutional, Gastrointestinal, Neurological, Skin,   Genitourinary, Musculoskeletal, HENT, Endocrine, Cardiovascular, Eyes,   Respiratory, Psychiatric, Allergic/Imm, Heme/Lymph    Last edited by Francis Francois Jr., MD on 6/16/2020  8:52 AM. (History)        Assessment /Plan     For exam results, see Encounter Report.    Borderline glaucoma of both eyes with ocular hypertension  IOP 13 OU, Stable observe  Follow up in about 4 months (around 10/16/2020) for IOP and Medication check.  Pseudophakia  Stable observe  Refractive error  Satisfied with current glasses

## 2020-06-24 ENCOUNTER — LAB VISIT (OUTPATIENT)
Dept: LAB | Facility: HOSPITAL | Age: 85
End: 2020-06-24
Attending: FAMILY MEDICINE
Payer: MEDICARE

## 2020-06-24 DIAGNOSIS — I10 ESSENTIAL HYPERTENSION: ICD-10-CM

## 2020-06-24 DIAGNOSIS — R73.03 PREDIABETES: ICD-10-CM

## 2020-06-24 DIAGNOSIS — E78.00 PURE HYPERCHOLESTEROLEMIA: ICD-10-CM

## 2020-06-24 LAB
ALBUMIN SERPL BCP-MCNC: 4.1 G/DL (ref 3.5–5.2)
ALP SERPL-CCNC: 72 U/L (ref 55–135)
ALT SERPL W/O P-5'-P-CCNC: 13 U/L (ref 10–44)
ANION GAP SERPL CALC-SCNC: 7 MMOL/L (ref 8–16)
AST SERPL-CCNC: 18 U/L (ref 10–40)
BASOPHILS # BLD AUTO: 0.05 K/UL (ref 0–0.2)
BASOPHILS NFR BLD: 0.8 % (ref 0–1.9)
BILIRUB SERPL-MCNC: 0.3 MG/DL (ref 0.1–1)
BUN SERPL-MCNC: 13 MG/DL (ref 8–23)
CALCIUM SERPL-MCNC: 9.7 MG/DL (ref 8.7–10.5)
CHLORIDE SERPL-SCNC: 98 MMOL/L (ref 95–110)
CHOLEST SERPL-MCNC: 210 MG/DL (ref 120–199)
CHOLEST/HDLC SERPL: 3.2 {RATIO} (ref 2–5)
CO2 SERPL-SCNC: 27 MMOL/L (ref 23–29)
CREAT SERPL-MCNC: 0.8 MG/DL (ref 0.5–1.4)
DIFFERENTIAL METHOD: ABNORMAL
EOSINOPHIL # BLD AUTO: 0.2 K/UL (ref 0–0.5)
EOSINOPHIL NFR BLD: 2.5 % (ref 0–8)
ERYTHROCYTE [DISTWIDTH] IN BLOOD BY AUTOMATED COUNT: 13.2 % (ref 11.5–14.5)
EST. GFR  (AFRICAN AMERICAN): >60 ML/MIN/1.73 M^2
EST. GFR  (NON AFRICAN AMERICAN): >60 ML/MIN/1.73 M^2
ESTIMATED AVG GLUCOSE: 123 MG/DL (ref 68–131)
GLUCOSE SERPL-MCNC: 99 MG/DL (ref 70–110)
HBA1C MFR BLD HPLC: 5.9 % (ref 4–5.6)
HCT VFR BLD AUTO: 40.1 % (ref 37–48.5)
HDLC SERPL-MCNC: 66 MG/DL (ref 40–75)
HDLC SERPL: 31.4 % (ref 20–50)
HGB BLD-MCNC: 12.6 G/DL (ref 12–16)
IMM GRANULOCYTES # BLD AUTO: 0.02 K/UL (ref 0–0.04)
IMM GRANULOCYTES NFR BLD AUTO: 0.3 % (ref 0–0.5)
LDLC SERPL CALC-MCNC: 119.8 MG/DL (ref 63–159)
LYMPHOCYTES # BLD AUTO: 1.7 K/UL (ref 1–4.8)
LYMPHOCYTES NFR BLD: 26.1 % (ref 18–48)
MCH RBC QN AUTO: 29.4 PG (ref 27–31)
MCHC RBC AUTO-ENTMCNC: 31.4 G/DL (ref 32–36)
MCV RBC AUTO: 94 FL (ref 82–98)
MONOCYTES # BLD AUTO: 0.6 K/UL (ref 0.3–1)
MONOCYTES NFR BLD: 8.8 % (ref 4–15)
NEUTROPHILS # BLD AUTO: 3.9 K/UL (ref 1.8–7.7)
NEUTROPHILS NFR BLD: 61.5 % (ref 38–73)
NONHDLC SERPL-MCNC: 144 MG/DL
NRBC BLD-RTO: 0 /100 WBC
PLATELET # BLD AUTO: 269 K/UL (ref 150–350)
PMV BLD AUTO: 9.9 FL (ref 9.2–12.9)
POTASSIUM SERPL-SCNC: 4.6 MMOL/L (ref 3.5–5.1)
PROT SERPL-MCNC: 8.1 G/DL (ref 6–8.4)
RBC # BLD AUTO: 4.29 M/UL (ref 4–5.4)
SODIUM SERPL-SCNC: 132 MMOL/L (ref 136–145)
TRIGL SERPL-MCNC: 121 MG/DL (ref 30–150)
TSH SERPL DL<=0.005 MIU/L-ACNC: 1.19 UIU/ML (ref 0.4–4)
WBC # BLD AUTO: 6.39 K/UL (ref 3.9–12.7)

## 2020-06-24 PROCEDURE — 80061 LIPID PANEL: CPT

## 2020-06-24 PROCEDURE — 36415 COLL VENOUS BLD VENIPUNCTURE: CPT | Mod: PO

## 2020-06-24 PROCEDURE — 85025 COMPLETE CBC W/AUTO DIFF WBC: CPT

## 2020-06-24 PROCEDURE — 80053 COMPREHEN METABOLIC PANEL: CPT

## 2020-06-24 PROCEDURE — 83036 HEMOGLOBIN GLYCOSYLATED A1C: CPT

## 2020-06-24 PROCEDURE — 84443 ASSAY THYROID STIM HORMONE: CPT

## 2020-06-30 ENCOUNTER — EXTERNAL CHRONIC CARE MANAGEMENT (OUTPATIENT)
Dept: PRIMARY CARE CLINIC | Facility: CLINIC | Age: 85
End: 2020-06-30
Payer: MEDICARE

## 2020-06-30 ENCOUNTER — OFFICE VISIT (OUTPATIENT)
Dept: FAMILY MEDICINE | Facility: CLINIC | Age: 85
End: 2020-06-30
Payer: MEDICARE

## 2020-06-30 VITALS
HEART RATE: 90 BPM | HEIGHT: 64 IN | WEIGHT: 159.19 LBS | DIASTOLIC BLOOD PRESSURE: 80 MMHG | BODY MASS INDEX: 27.18 KG/M2 | OXYGEN SATURATION: 97 % | SYSTOLIC BLOOD PRESSURE: 150 MMHG | TEMPERATURE: 98 F

## 2020-06-30 DIAGNOSIS — R73.03 PREDIABETES: Primary | ICD-10-CM

## 2020-06-30 DIAGNOSIS — E78.5 HYPERLIPIDEMIA, UNSPECIFIED HYPERLIPIDEMIA TYPE: ICD-10-CM

## 2020-06-30 DIAGNOSIS — M51.37 DEGENERATION OF INTERVERTEBRAL DISC OF LUMBOSACRAL REGION: ICD-10-CM

## 2020-06-30 DIAGNOSIS — R79.89 LOW SERUM SODIUM: ICD-10-CM

## 2020-06-30 PROCEDURE — 99214 OFFICE O/P EST MOD 30 MIN: CPT | Mod: S$PBB,,, | Performed by: FAMILY MEDICINE

## 2020-06-30 PROCEDURE — 99490 CHRNC CARE MGMT STAFF 1ST 20: CPT | Mod: S$PBB,,, | Performed by: FAMILY MEDICINE

## 2020-06-30 PROCEDURE — 99999 PR PBB SHADOW E&M-EST. PATIENT-LVL III: ICD-10-PCS | Mod: PBBFAC,,, | Performed by: FAMILY MEDICINE

## 2020-06-30 PROCEDURE — 99999 PR PBB SHADOW E&M-EST. PATIENT-LVL III: CPT | Mod: PBBFAC,,, | Performed by: FAMILY MEDICINE

## 2020-06-30 PROCEDURE — 99490 CHRNC CARE MGMT STAFF 1ST 20: CPT | Mod: PBBFAC,PO | Performed by: FAMILY MEDICINE

## 2020-06-30 PROCEDURE — 99490 PR CHRONIC CARE MGMT, 1ST 20 MIN: ICD-10-PCS | Mod: S$PBB,,, | Performed by: FAMILY MEDICINE

## 2020-06-30 PROCEDURE — 99214 PR OFFICE/OUTPT VISIT, EST, LEVL IV, 30-39 MIN: ICD-10-PCS | Mod: S$PBB,,, | Performed by: FAMILY MEDICINE

## 2020-06-30 PROCEDURE — 99213 OFFICE O/P EST LOW 20 MIN: CPT | Mod: PBBFAC,PO,25 | Performed by: FAMILY MEDICINE

## 2020-06-30 RX ORDER — DULOXETIN HYDROCHLORIDE 30 MG/1
30 CAPSULE, DELAYED RELEASE ORAL DAILY
Qty: 90 CAPSULE | Refills: 1 | Status: SHIPPED | OUTPATIENT
Start: 2020-06-30 | End: 2020-11-03

## 2020-06-30 NOTE — PROGRESS NOTES
Subjective:       Patient ID: Libby Estrada is a 85 y.o. female.    Chief Complaint: Hypertension (6 mo follow up also wants to discuss pain isssues)    Here for f/u htn and new issues of persistent chronic leg pain. Has seen several specialists including planned neurosurgery that was cancelled. Interested in duloxetine.    Hypertension  This is a chronic problem. The current episode started more than 1 year ago. The problem is controlled. Pertinent negatives include no chest pain, palpitations or shortness of breath.     Review of Systems   Constitutional: Negative for chills and fever.   Respiratory: Negative for cough, chest tightness and shortness of breath.    Cardiovascular: Negative for chest pain, palpitations and leg swelling.   Endocrine: Negative for cold intolerance and heat intolerance.   Musculoskeletal: Positive for arthralgias and back pain.   Psychiatric/Behavioral: Negative for decreased concentration. The patient is not nervous/anxious.        Objective:      Physical Exam  Vitals signs and nursing note reviewed.   Constitutional:       Appearance: She is well-developed.   HENT:      Head: Normocephalic and atraumatic.   Cardiovascular:      Rate and Rhythm: Normal rate and regular rhythm.      Heart sounds: Normal heart sounds.   Pulmonary:      Effort: Pulmonary effort is normal.      Breath sounds: Normal breath sounds.         Assessment:       1. Prediabetes    2. Hyperlipidemia, unspecified hyperlipidemia type    3. Degeneration of intervertebral disc of lumbosacral region    4. Low serum sodium        Plan:       Prediabetes    Hyperlipidemia, unspecified hyperlipidemia type    Degeneration of intervertebral disc of lumbosacral region    Low serum sodium  -     Basic metabolic panel; Future; Expected date: 06/30/2020    Other orders  -     DULoxetine (CYMBALTA) 30 MG capsule; Take 1 capsule (30 mg total) by mouth once daily.  Dispense: 90 capsule; Refill: 1    sugar stable  States no issue  with htn in past and not on meds; home bp log and nurse check in 2 weeks  Lipid improved  Add sodium to diet and recheck    Follow up in about 6 months (around 12/30/2020), or if symptoms worsen or fail to improve.

## 2020-07-13 ENCOUNTER — PES CALL (OUTPATIENT)
Dept: ADMINISTRATIVE | Facility: CLINIC | Age: 85
End: 2020-07-13

## 2020-07-14 ENCOUNTER — CLINICAL SUPPORT (OUTPATIENT)
Dept: FAMILY MEDICINE | Facility: CLINIC | Age: 85
End: 2020-07-14
Payer: MEDICARE

## 2020-07-14 ENCOUNTER — LAB VISIT (OUTPATIENT)
Dept: LAB | Facility: HOSPITAL | Age: 85
End: 2020-07-14
Attending: FAMILY MEDICINE
Payer: MEDICARE

## 2020-07-14 VITALS
SYSTOLIC BLOOD PRESSURE: 144 MMHG | WEIGHT: 156.75 LBS | OXYGEN SATURATION: 95 % | HEIGHT: 64 IN | DIASTOLIC BLOOD PRESSURE: 80 MMHG | BODY MASS INDEX: 26.76 KG/M2 | HEART RATE: 86 BPM

## 2020-07-14 DIAGNOSIS — Z01.30 BP CHECK: Primary | ICD-10-CM

## 2020-07-14 DIAGNOSIS — R79.89 LOW SERUM SODIUM: ICD-10-CM

## 2020-07-14 LAB
ANION GAP SERPL CALC-SCNC: 9 MMOL/L (ref 8–16)
BUN SERPL-MCNC: 11 MG/DL (ref 8–23)
CALCIUM SERPL-MCNC: 9.8 MG/DL (ref 8.7–10.5)
CHLORIDE SERPL-SCNC: 101 MMOL/L (ref 95–110)
CO2 SERPL-SCNC: 27 MMOL/L (ref 23–29)
CREAT SERPL-MCNC: 0.9 MG/DL (ref 0.5–1.4)
EST. GFR  (AFRICAN AMERICAN): >60 ML/MIN/1.73 M^2
EST. GFR  (NON AFRICAN AMERICAN): 59 ML/MIN/1.73 M^2
GLUCOSE SERPL-MCNC: 93 MG/DL (ref 70–110)
POTASSIUM SERPL-SCNC: 4.3 MMOL/L (ref 3.5–5.1)
SODIUM SERPL-SCNC: 137 MMOL/L (ref 136–145)

## 2020-07-14 PROCEDURE — 99999 PR PBB SHADOW E&M-EST. PATIENT-LVL III: ICD-10-PCS | Mod: PBBFAC,,,

## 2020-07-14 PROCEDURE — 36415 COLL VENOUS BLD VENIPUNCTURE: CPT | Mod: PO

## 2020-07-14 PROCEDURE — 99213 OFFICE O/P EST LOW 20 MIN: CPT | Mod: PBBFAC,PO

## 2020-07-14 PROCEDURE — 99999 PR PBB SHADOW E&M-EST. PATIENT-LVL III: CPT | Mod: PBBFAC,,,

## 2020-07-14 PROCEDURE — 80048 BASIC METABOLIC PNL TOTAL CA: CPT | Mod: PO

## 2020-07-31 ENCOUNTER — EXTERNAL CHRONIC CARE MANAGEMENT (OUTPATIENT)
Dept: PRIMARY CARE CLINIC | Facility: CLINIC | Age: 85
End: 2020-07-31
Payer: MEDICARE

## 2020-07-31 PROCEDURE — 99490 CHRNC CARE MGMT STAFF 1ST 20: CPT | Mod: S$PBB,,, | Performed by: FAMILY MEDICINE

## 2020-07-31 PROCEDURE — 99490 CHRNC CARE MGMT STAFF 1ST 20: CPT | Mod: PBBFAC,PO | Performed by: FAMILY MEDICINE

## 2020-07-31 PROCEDURE — 99490 PR CHRONIC CARE MGMT, 1ST 20 MIN: ICD-10-PCS | Mod: S$PBB,,, | Performed by: FAMILY MEDICINE

## 2020-08-31 ENCOUNTER — EXTERNAL CHRONIC CARE MANAGEMENT (OUTPATIENT)
Dept: PRIMARY CARE CLINIC | Facility: CLINIC | Age: 85
End: 2020-08-31
Payer: MEDICARE

## 2020-08-31 PROCEDURE — 99490 CHRNC CARE MGMT STAFF 1ST 20: CPT | Mod: PBBFAC,PO | Performed by: FAMILY MEDICINE

## 2020-08-31 PROCEDURE — 99490 CHRNC CARE MGMT STAFF 1ST 20: CPT | Mod: S$PBB,,, | Performed by: FAMILY MEDICINE

## 2020-08-31 PROCEDURE — 99490 PR CHRONIC CARE MGMT, 1ST 20 MIN: ICD-10-PCS | Mod: S$PBB,,, | Performed by: FAMILY MEDICINE

## 2020-09-17 DIAGNOSIS — H40.053 BORDERLINE GLAUCOMA OF BOTH EYES WITH OCULAR HYPERTENSION: ICD-10-CM

## 2020-09-17 RX ORDER — LATANOPROST 50 UG/ML
SOLUTION/ DROPS OPHTHALMIC
Qty: 2.5 ML | Refills: 12 | Status: SHIPPED | OUTPATIENT
Start: 2020-09-17 | End: 2021-10-13

## 2020-09-17 NOTE — TELEPHONE ENCOUNTER
Called pt to inform her that Dr Francois sent in her refill for latanoprost to [facility].

## 2020-10-19 ENCOUNTER — PATIENT OUTREACH (OUTPATIENT)
Dept: ADMINISTRATIVE | Facility: OTHER | Age: 85
End: 2020-10-19

## 2020-10-19 NOTE — PROGRESS NOTES
LINKS immunization registry not responding  Care Everywhere updated  Health Maintenance updated  Chart reviewed for overdue Proactive [facility] Encounters (KULDEEP) health maintenance testing (CRS, Breast Ca, Diabetic Eye Exam)   Orders entered:N/A

## 2020-10-20 ENCOUNTER — OFFICE VISIT (OUTPATIENT)
Dept: OPHTHALMOLOGY | Facility: CLINIC | Age: 85
End: 2020-10-20
Payer: MEDICARE

## 2020-10-20 DIAGNOSIS — Z96.1 PSEUDOPHAKIA: ICD-10-CM

## 2020-10-20 DIAGNOSIS — H52.7 REFRACTIVE ERROR: ICD-10-CM

## 2020-10-20 DIAGNOSIS — H40.053 BORDERLINE GLAUCOMA OF BOTH EYES WITH OCULAR HYPERTENSION: Primary | ICD-10-CM

## 2020-10-20 PROCEDURE — 92012 INTRM OPH EXAM EST PATIENT: CPT | Mod: S$PBB,,, | Performed by: OPHTHALMOLOGY

## 2020-10-20 PROCEDURE — 99213 OFFICE O/P EST LOW 20 MIN: CPT | Mod: PBBFAC,PO | Performed by: OPHTHALMOLOGY

## 2020-10-20 PROCEDURE — 99999 PR PBB SHADOW E&M-EST. PATIENT-LVL III: ICD-10-PCS | Mod: PBBFAC,,, | Performed by: OPHTHALMOLOGY

## 2020-10-20 PROCEDURE — 99999 PR PBB SHADOW E&M-EST. PATIENT-LVL III: CPT | Mod: PBBFAC,,, | Performed by: OPHTHALMOLOGY

## 2020-10-20 PROCEDURE — 92012 PR EYE EXAM, EST PATIENT,INTERMED: ICD-10-PCS | Mod: S$PBB,,, | Performed by: OPHTHALMOLOGY

## 2020-10-20 RX ORDER — INFLUENZA A VIRUS A/VICTORIA/2454/2019 IVR-207 (H1N1) ANTIGEN (PROPIOLACTONE INACTIVATED), INFLUENZA A VIRUS A/HONG KONG/2671/2019 IVR-208 (H3N2) ANTIGEN (PROPIOLACTONE INACTIVATED), INFLUENZA B VIRUS B/VICTORIA/705/2018 BVR-11 ANTIGEN (PROPIOLACTONE INACTIVATED), INFLUENZA B VIRUS B/PHUKET/3073/2013 BVR-1B ANTIGEN (PROPIOLACTONE INACTIVATED) 15; 15; 15; 15 UG/.5ML; UG/.5ML; UG/.5ML; UG/.5ML
INJECTION, SUSPENSION INTRAMUSCULAR
COMMUNITY
Start: 2020-09-25 | End: 2020-11-03 | Stop reason: ALTCHOICE

## 2020-10-20 NOTE — PROGRESS NOTES
HPI     DLE- 6/16/20    Pt is here for 4 month IOP ck. Pt has been taking Cosopt BID OU and   Latanoprost QHS OU as directed. Denies eye pain. Denies flashes or   floaters. Va is stable. Eye vitamins taken daily     Last edited by Jake Tam on 10/20/2020 10:30 AM. (History)        ROS     Negative for: Constitutional, Gastrointestinal, Neurological, Skin,   Genitourinary, Musculoskeletal, HENT, Endocrine, Cardiovascular, Eyes,   Respiratory, Psychiatric, Allergic/Imm, Heme/Lymph    Last edited by Francis Francois Jr., MD on 10/20/2020 11:18 AM. (History)        Assessment /Plan     For exam results, see Encounter Report.    Borderline glaucoma of both eyes with ocular hypertension    Pseudophakia    Refractive error      Assessment /Plan     For exam results, see Encounter Report.    Borderline glaucoma of both eyes with ocular hypertension  IOP OD 15 OS 13, Stable observe  Follow up in about 4 months (around 2/20/2021) for IOP and Medication check.  Pseudophakia  Stable observe  Refractive error  Satisfied with current glasses

## 2020-10-20 NOTE — PATIENT INSTRUCTIONS
Glaucoma, Open-Angle (Chronic)    The eye is a fluid-filled globe with a lens in the front and a light-sensitive screen in the back (retina). The optic nerve conducts light signals from the retina to the brain and allows you to see visual images. Eye fluid is constantly produced within the eye. Excess fluid drains out into the bloodstream.  Open-angle glaucoma is a condition where the fluid pressure in the eye gradually increases and reduces blood flow to the optic nerve. This causes a gradual loss of vision, over months to years, which may progress to complete blindness if not treated. The cause of glaucoma is not known.  Open-angle glaucoma is painless. The first symptoms may be loss of peripheral (side) vision. Since most people dont pay much attention to their peripheral vision, you may have a lot of vision loss before you become aware of the problem. The vision loss is permanent.  Open-angle glaucoma can be treated with eye drops, surgery, and sometimes pills. These help to lower the pressure in the eye. Treatment is usually successful at keeping pressures low and preventing further vision loss. However, the condition cannot be cured. You will need to receive treatment for the rest of your life. Regular follow-up care with an ophthalmologist (a medical doctor who specializes in eye care) is very important to follow your response to the medicines.  Home care  · Take prescribed medicines exactly as directed.  · The eye needs certain vitamins and minerals for good health--especially vitamins A, C, and E, as well as zinc and copper. Eat a healthy diet full of fruits and vegetables to ensure that you get enough of these nutrients. If you have trouble following a balanced diet, consider taking a vitamin and mineral supplement.  · Drink 6 to 8 glasses of water in the course of a day. Drinking too much at one time (more than 1 quart) may increase eye pressure.  · Limit the amount of caffeine that you  drink.  · Regular exercise (3 times a week) may help reduce eye pressure. Avoid exercise positions with your head below your waist (such as bending over). This position will increase eye pressure. Talk to your healthcare provider about an appropriate exercise program for you.  · Protect your eyes. An eye injury can cause increased eye pressure. Wear safety glasses or goggles when you play sports, use tools or machinery, or work with chemicals.  Follow-up care  Follow up with your eye doctor, or as advised. Regular appointments will help make sure that your treatment is helping to keep your eyes at a safe pressure. Note: Open-angle glaucoma tends to run in families. Other family members over the age of 40 should also be examined by an eye doctor.  When to seek medical advice  Call your healthcare provider right away if any of these occur:  · Further loss of peripheral vision  · Blurred vision  · Eye pain or redness  · Severe headache  · Closplint halos around lights  · Sudden loss of vision  Date Last Reviewed: 6/22/2015  © 9374-3476 [employer]. [address] [place] [address], [place] [address]. All rights reserved. This information is not intended as a substitute for professional medical care. Always follow your healthcare professional's instructions.

## 2020-11-02 ENCOUNTER — TELEPHONE (OUTPATIENT)
Dept: FAMILY MEDICINE | Facility: CLINIC | Age: 85
End: 2020-11-02

## 2020-11-02 NOTE — TELEPHONE ENCOUNTER
----- Message from Nelly Cornejo sent at 11/2/2020 11:10 AM CST -----  Regarding: Pt Advice  Contact: Patient  Type: Needs Medical Advice  Who Called:  Patient  Symptoms (please be specific):  Back Pain down her legs  How long has patient had these symptoms:  The Last week her pain has increased  Pharmacy name and phone #:  [facility] - [place] - [address] [address] [facility] [address], [address];  Best Call Back Number: [phone]  Additional Information: Patient called and stated her pain levels are increasing and would like a nurse to please call her.

## 2020-11-03 ENCOUNTER — OFFICE VISIT (OUTPATIENT)
Dept: PAIN MEDICINE | Facility: CLINIC | Age: 85
End: 2020-11-03
Payer: MEDICARE

## 2020-11-03 ENCOUNTER — OFFICE VISIT (OUTPATIENT)
Dept: FAMILY MEDICINE | Facility: CLINIC | Age: 85
End: 2020-11-03
Payer: MEDICARE

## 2020-11-03 VITALS
DIASTOLIC BLOOD PRESSURE: 78 MMHG | HEART RATE: 76 BPM | WEIGHT: 159.19 LBS | BODY MASS INDEX: 27.32 KG/M2 | OXYGEN SATURATION: 96 % | SYSTOLIC BLOOD PRESSURE: 166 MMHG

## 2020-11-03 VITALS
SYSTOLIC BLOOD PRESSURE: 176 MMHG | HEART RATE: 110 BPM | OXYGEN SATURATION: 95 % | BODY MASS INDEX: 27.25 KG/M2 | TEMPERATURE: 99 F | DIASTOLIC BLOOD PRESSURE: 76 MMHG | WEIGHT: 159.63 LBS | HEIGHT: 64 IN

## 2020-11-03 DIAGNOSIS — M51.37 DEGENERATION OF INTERVERTEBRAL DISC OF LUMBOSACRAL REGION: ICD-10-CM

## 2020-11-03 DIAGNOSIS — M48.062 SPINAL STENOSIS OF LUMBAR REGION WITH NEUROGENIC CLAUDICATION: ICD-10-CM

## 2020-11-03 DIAGNOSIS — G89.29 CHRONIC BILATERAL LOW BACK PAIN WITH BILATERAL SCIATICA: ICD-10-CM

## 2020-11-03 DIAGNOSIS — M54.41 CHRONIC BILATERAL LOW BACK PAIN WITH BILATERAL SCIATICA: ICD-10-CM

## 2020-11-03 DIAGNOSIS — Z13.9 SCREENING PROCEDURE: ICD-10-CM

## 2020-11-03 DIAGNOSIS — R03.0 ELEVATED BLOOD PRESSURE READING IN OFFICE WITHOUT DIAGNOSIS OF HYPERTENSION: ICD-10-CM

## 2020-11-03 DIAGNOSIS — M54.42 CHRONIC BILATERAL LOW BACK PAIN WITH BILATERAL SCIATICA: ICD-10-CM

## 2020-11-03 DIAGNOSIS — M54.16 LUMBAR RADICULOPATHY: Primary | ICD-10-CM

## 2020-11-03 DIAGNOSIS — M51.37 DEGENERATION OF INTERVERTEBRAL DISC OF LUMBOSACRAL REGION: Primary | ICD-10-CM

## 2020-11-03 PROCEDURE — 99999 PR PBB SHADOW E&M-EST. PATIENT-LVL III: ICD-10-PCS | Mod: PBBFAC,,, | Performed by: PHYSICIAN ASSISTANT

## 2020-11-03 PROCEDURE — 99999 PR PBB SHADOW E&M-EST. PATIENT-LVL V: CPT | Mod: PBBFAC,,, | Performed by: ANESTHESIOLOGY

## 2020-11-03 PROCEDURE — 99204 PR OFFICE/OUTPT VISIT, NEW, LEVL IV, 45-59 MIN: ICD-10-PCS | Mod: S$PBB,,, | Performed by: ANESTHESIOLOGY

## 2020-11-03 PROCEDURE — 99204 OFFICE O/P NEW MOD 45 MIN: CPT | Mod: S$PBB,,, | Performed by: ANESTHESIOLOGY

## 2020-11-03 PROCEDURE — 99215 OFFICE O/P EST HI 40 MIN: CPT | Mod: PBBFAC,PN | Performed by: ANESTHESIOLOGY

## 2020-11-03 PROCEDURE — 99213 OFFICE O/P EST LOW 20 MIN: CPT | Mod: PBBFAC,27,PO | Performed by: PHYSICIAN ASSISTANT

## 2020-11-03 PROCEDURE — 99999 PR PBB SHADOW E&M-EST. PATIENT-LVL III: CPT | Mod: PBBFAC,,, | Performed by: PHYSICIAN ASSISTANT

## 2020-11-03 PROCEDURE — 99999 PR PBB SHADOW E&M-EST. PATIENT-LVL V: ICD-10-PCS | Mod: PBBFAC,,, | Performed by: ANESTHESIOLOGY

## 2020-11-03 PROCEDURE — 99214 PR OFFICE/OUTPT VISIT, EST, LEVL IV, 30-39 MIN: ICD-10-PCS | Mod: S$PBB,,, | Performed by: PHYSICIAN ASSISTANT

## 2020-11-03 PROCEDURE — 99214 OFFICE O/P EST MOD 30 MIN: CPT | Mod: S$PBB,,, | Performed by: PHYSICIAN ASSISTANT

## 2020-11-03 RX ORDER — SODIUM CHLORIDE, SODIUM LACTATE, POTASSIUM CHLORIDE, CALCIUM CHLORIDE 600; 310; 30; 20 MG/100ML; MG/100ML; MG/100ML; MG/100ML
INJECTION, SOLUTION INTRAVENOUS CONTINUOUS
Status: CANCELLED | OUTPATIENT
Start: 2020-11-11

## 2020-11-03 NOTE — PROGRESS NOTES
Subjective:      Patient ID: Libby Estrada is a 85 y.o. female.    Chief Complaint: Back Pain (week)    HPI   Patient takes gabapentin for chronic intermittent back pain, knee pain, or hand pain.  She has been evaluated by neurosurgery and not a surgery candidate.  Cymbalta gave her an allergic reaction.    Patient is not treated for hypertension.  Historically has borderline blood pressure and treating could be more hazardous.  BP Readings from Last 3 Encounters:   11/11/20 (!) 183/86   11/03/20 (!) 176/76   11/03/20 (!) 166/78     Review of Systems   Constitutional: Negative for appetite change, chills and fever.   Respiratory: Negative for shortness of breath.    Cardiovascular: Negative for chest pain.   Gastrointestinal: Negative for abdominal pain, blood in stool, constipation, diarrhea, nausea and vomiting.   Genitourinary: Negative for hematuria.   Musculoskeletal: Positive for arthralgias and back pain.   Neurological: Negative for dizziness, light-headedness and headaches.       Objective:   BP (!) 166/78   Pulse 76   Wt 72.2 kg (159 lb 2.8 oz)   LMP  (LMP Unknown)   SpO2 96%   BMI 27.32 kg/m²      Physical Exam  Vitals signs reviewed.   Constitutional:       General: She is not in acute distress.     Appearance: Normal appearance. She is well-developed and well-groomed.   HENT:      Head: Normocephalic and atraumatic.      Right Ear: Hearing and external ear normal.      Left Ear: Hearing and external ear normal.   Eyes:      General: Lids are normal.      Conjunctiva/sclera: Conjunctivae normal.   Neck:      Musculoskeletal: Normal range of motion.      Trachea: Phonation normal.   Cardiovascular:      Rate and Rhythm: Normal rate and regular rhythm.      Heart sounds: Normal heart sounds. No murmur. No friction rub. No gallop.    Pulmonary:      Effort: Pulmonary effort is normal. No respiratory distress.      Breath sounds: Normal breath sounds. No decreased breath sounds, wheezing, rhonchi or  rales.   Musculoskeletal:      Lumbar back: She exhibits decreased range of motion and bony tenderness.      Comments: Uses cane to ambulate.  Positive right straight leg raise.   Skin:     General: Skin is warm and dry.      Findings: No rash.   Neurological:      General: No focal deficit present.      Mental Status: She is alert and oriented to person, place, and time.   Psychiatric:         Attention and Perception: Attention normal.         Mood and Affect: Mood normal.         Speech: Speech normal.         Behavior: Behavior normal. Behavior is cooperative.         Judgment: Judgment normal.       Assessment:      1. Degeneration of intervertebral disc of lumbosacral region    2. Elevated blood pressure reading in office without diagnosis of hypertension       Plan:   1. Degeneration of intervertebral disc of lumbosacral region  - Ambulatory referral/consult to Pain Clinic; Future    2. Elevated blood pressure reading in office without diagnosis of hypertension  Could be affected by pain.  Will continue to monitor.    Follow up as needed.  Patient agreed with plan and expressed understanding.    Thank you for allowing me to serve you,

## 2020-11-03 NOTE — H&P (VIEW-ONLY)
[facility] New Patient Evaluation    Referred by: Cristine Hidalgo PA-C  Reason for referral: back pain    CC:   Chief Complaint   Patient presents with    Low-back Pain      Last 3 PDI Scores 11/3/2020   Pain Disability Index (PDI) 18       HPI:   Libby Estrada is a 85 y.o. female who complains of back pain    Onset: about 8 years  Inciting Event: none  Progression: since onset, pain is gradually worsening  Current Pain Score: 8/10  Typical Range: 7-10/10  Timing: constant  Quality: burning, throbbing  Radiation: yes, down the back of both legs  Associated numbness or weakness: no numbness, no weakness   Exacerbated by: standing, walking, bending  Allievated by: rest, sitting, medications, laying down.   Is Pain Level Acceptable?: No    Previous Therapies:  PT/OT: yes, in the past  HEP:   Interventions:   Surgery:  Medications:   - NSAIDS:   - MSK Relaxants:   - TCAs:   - SNRIs:   - Topicals:   - Anticonvulsants: gabapentin  - Opioids:     History:    Current Outpatient Medications:     acetaminophen (TYLENOL ARTHRITIS PAIN ORAL), Take by mouth., Disp: , Rfl:     dorzolamide-timolol 2-0.5% (COSOPT) 22.3-6.8 mg/mL ophthalmic solution, INSTILL 1 DROP INTO BOTH EYES TWICE A DAY, Disp: 30 mL, Rfl: 4    ezetimibe (ZETIA) 10 mg tablet, Take 1 tablet (10 mg total) by mouth once daily., Disp: 90 tablet, Rfl: 3    gabapentin (NEURONTIN) 300 MG capsule, Take 300 mg by mouth 3 (three) times daily. , Disp: , Rfl: 2    latanoprost 0.005 % ophthalmic solution, PLACE 1 DROP INTO BOTH EYES EVERY EVENING. Ok to dispense 90 day supply, Disp: 2.5 mL, Rfl: 12    MULTIVITAMIN W-MINERALS/LUTEIN (CENTRUM SILVER ORAL), Take by mouth., Disp: , Rfl:     vitamin D 1000 units Tab, Take 1,000 Units by mouth once daily., Disp: , Rfl:     Past Medical History:   Diagnosis Date    Arthritis     Cancer     skin cancer    Cataract     Done OU    Episode of hypertension 12/28/2012    pt states she does not have, Maybe white coat  syndrome    GERD (gastroesophageal reflux disease)     Glaucoma     suspect    Hyperlipidemia     Ocular hypertension     Spinal stenosis        Past Surgical History:   Procedure Laterality Date    APPENDECTOMY      CATARACT EXTRACTION W/  INTRAOCULAR LENS IMPLANT Right 2015    Dr Engel    CATARACT EXTRACTION W/  INTRAOCULAR LENS IMPLANT Left 2020    Dr Francois    COLONOSCOPY      LUMBAR EPIDURAL INJECTION      MOUTH SURGERY      dental implants    PHACOEMULSIFICATION OF CATARACT Left 2020    Procedure: PHACOEMULSIFICATION, CATARACT;  Surgeon: Francis Francois Jr., MD;  Location: [facility];  Service: Ophthalmology;  Laterality: Left;  Left       Family History   Problem Relation Age of Onset    Diabetes Mother     Heart attack Mother     Cancer Maternal Aunt         ovarian    Heart attack Father     Arthritis Father     Heart disease Father          at age 68    Heart attack Sister     Heart attack Brother     Amblyopia Neg Hx     Blindness Neg Hx     Cataracts Neg Hx     Hypertension Neg Hx     Macular degeneration Neg Hx     Retinal detachment Neg Hx     Strabismus Neg Hx     Stroke Neg Hx     Thyroid disease Neg Hx     Glaucoma Neg Hx        Social History     Socioeconomic History    Marital status:      Spouse name: Not on file    Number of children: Not on file    Years of education: Not on file    Highest education level: Not on file   Occupational History    Not on file   Social Needs    Financial resource strain: Not on file    Food insecurity     Worry: Not on file     Inability: Not on file    Transportation needs     Medical: Not on file     Non-medical: Not on file   Tobacco Use    Smoking status: Never Smoker    Smokeless tobacco: Never Used   Substance and Sexual Activity    Alcohol use: Yes     Alcohol/week: 2.0 standard drinks     Types: 2 Glasses of wine per week    Drug use: No    Sexual activity: Not on file   Lifestyle  "   Physical activity     Days per week: Not on file     Minutes per session: Not on file    Stress: Not on file   Relationships    Social connections     Talks on phone: Not on file     Gets together: Not on file     Attends Confucianist service: Not on file     Active member of club or organization: Not on file     Attends meetings of clubs or organizations: Not on file     Relationship status: Not on file   Other Topics Concern    Not on file   Social History Narrative    Not on file       Review of patient's allergies indicates:   Allergen Reactions    Cymbalta [duloxetine] Nausea And Vomiting    Polysporin [bacitracin-polymyxin b] Rash    Statins-hmg-coa reductase inhibitors      "muscles freezing and could not use them"    Codeine Nausea And Vomiting    Gamma immune glob from whey Other (See Comments)     shakes    Lidocaine Itching    Penicillins Hives    Sulfa (sulfonamide antibiotics) Swelling    Bacitracin Rash       Review of Systems:  General ROS: negative for - fever  Psychological ROS: negative for - hostility  Hematological and Lymphatic ROS: negative for - bleeding problems  Endocrine ROS: negative for - unexpected weight changes  Respiratory ROS: no cough, shortness of breath, or wheezing  Cardiovascular ROS: no chest pain or dyspnea on exertion  Gastrointestinal ROS: no abdominal pain, change in bowel habits, or black or bloody stools  Musculoskeletal ROS: negative for - muscular weakness  Neurological ROS: negative for - bowel and bladder control changes or numbness/tingling  Dermatological ROS: negative for rash    Physical Exam:  Vitals:    11/03/20 1516   BP: (!) 176/76   Pulse: 110   Temp: 98.5 °F (36.9 °C)   TempSrc: Temporal   SpO2: 95%   Weight: 72.4 kg (159 lb 9.8 oz)   Height: 5' 4" (1.626 m)   PainSc:   7   PainLoc: Back     Body mass index is 27.4 kg/m².     Gen: NAD  Gait: gait intact  Psych:  Mood appropriate for given condition  HEENT: eyes anicteric   GI: Abd soft  CV: " RRR  Lungs: breathing unlabored   ROM: limited AROM of the L spine in all planes, full ROM at ankles, knees and hips  Lumbar flexion 90 degrees, extension 50 degrees, side bending 30 degrees.    Sensation: intact to light touch in all dermatomes tested from L2-S1 bilaterally  Reflexes: 0/0 b/l patella and achilles  Palpation:  -TTP over the b/l greater trochanters and bilateral SI joint  Tone: normal in the b/l knees and hips   Skin: intact  Extremities: No edema in b/l ankles or hands  Provacative tests: + b/l axial facet loading       Right Left   L2/3 Iliacus Hip flexion  5-  5   L3/4 Qudratus Femoris Knee Extension  5  5   L4/5 Tib Anterior Ankle Dorsiflexion   5  5   L5/S1 Extensor Hallicus Longus Great toe extension  5  5                 S1/S2 Gastroc/Soleus Plantar Flexion  5  5       Imaging:  MRI lumbar spine 5/17/19  FINDINGS:  The conus ends at T12-L1.  No diffuse abnormal marrow or central conus signal is appreciated. No paraspinal fluid collections are appreciated. Osseous alignment appears maintained. There is multilevel facet, ligamentous hypertrophy mid to lower lumbar predominantly similar as well as maintained alignment demonstrating multilevel disc space narrowing, desiccation and marginal anterolisthesis L4-5.  There is Modic endplate degeneration at L2-3 slightly increased overall.  No interval extruded  type fragment is appreciated.     L1-2 demonstrates some broad-based concentric disc bulging, thecal sac triangulation as well as mild lateral recess, inferior neural foraminal narrowing bilaterally.  L2-3 demonstrates broad-based concentric disc bulging with thecal sac triangulation as well as moderate neural foraminal narrowing bilaterally right slightly more so than left.  L3-4 demonstrates broad-based concentric disc bulging with thecal sac triangulation, narrowing overall.  There is some annular fissuring as well as moderate left, moderate to significant right neural foraminal  narrowing with some posterior element hypertrophy, spurring.  L4-5 demonstrates broad-based concentric disc bulging with annular fissuring.  There is moderate neural foraminal narrowing demonstrated bilaterally with spinal canal narrowing overall along with exuberant posterior element, ligamentous hypertrophy.  L5-S1 demonstrates some broad-based concentric disc bulging with annular fissuring and small to moderate central protrusion.  There is significant neural foraminal narrowing demonstrated on the left as well as moderate right.    Labs:  BMP  Lab Results   Component Value Date     07/14/2020    K 4.3 07/14/2020     07/14/2020    CO2 27 07/14/2020    BUN 11 07/14/2020    CREATININE 0.9 07/14/2020    CALCIUM 9.8 07/14/2020    ANIONGAP 9 07/14/2020    ESTGFRAFRICA >60 07/14/2020    EGFRNONAA 59 (A) 07/14/2020     Lab Results   Component Value Date    ALT 13 06/24/2020    AST 18 06/24/2020    ALKPHOS 72 06/24/2020    BILITOT 0.3 06/24/2020       Assessment:   Problem List Items Addressed This Visit        Neuro    Degeneration of intervertebral disc of lumbosacral region      Other Visit Diagnoses     Lumbar radiculopathy    -  Primary    Screening procedure        Relevant Orders    COVID-19 Routine Screening    Spinal stenosis of lumbar region with neurogenic claudication        Chronic bilateral low back pain with bilateral sciatica            85 y.o. year old female presents to the office with back pain.  She's had chronic lower back pain for the past 8 years that has been gradually worsening.  Today her pain is 8/10, constant, burning, throbbing, radiating down the back of both of her legs.  She denies numbness, weakness, bowel/bladder dysfunction.  Her pain is worse with standing, walking, bending, and lifting and relieved with rest and sitting.  She has been taking gabapentin with mild relief.  On exam she has pain limited 5-/5 weakness of right hip flexion and + bilateral axial facet loading.  I  independently reviewed her lumbar MRI and it is c/w multilevel bilateral facet arthropathy and severe central canal stenosis at L4-5.  She describes neurogenic claudication.  She has seen Dr. Cristina but surgery not recommended at this time.  She has done PT in the past but has some limitations now due to living at [facility] and [place] [address].  Her pain is limiting her mobility and interfering with her ADL's.  We will schedule for lumbar LENY.  Follow up 2 weeks post injection.    Treatment Plan:   Procedures: L5/S1 ILESI. Procedure explained using an anatomical model.  Risks, benefits, alternatives explained to patient who verbalized understanding, including increased risk of infection, bleeding, need for additional procedures or surgery, and nerve damage.  Questions regarding the procedure, risks, expected outcome, and possible side effects were solicited and answered to the patient's satisfaction.  Libby wishes to proceed with the injection.  Verbal and written consent were obtained in clinic today.  PT/OT/HEP: I've given her some home exercises to start  Medications: continue medications as prescribed.  Can consider low dose opioid depending on how she responds to LENY  Labs: Reviewed and medications are appropriately dosed for current hepatorenal function.  Imaging: No additional recommended at this time.    : Not applicable    Cachorro Figueroa M.D.  Interventional Pain Medicine / Anesthesiology

## 2020-11-03 NOTE — PROGRESS NOTES
[facility] New Patient Evaluation    Referred by: Cristine Hidalgo PA-C  Reason for referral: back pain    CC:   Chief Complaint   Patient presents with    Low-back Pain      Last 3 PDI Scores 11/3/2020   Pain Disability Index (PDI) 18       HPI:   Libby Estrada is a 85 y.o. female who complains of back pain    Onset: about 8 years  Inciting Event: none  Progression: since onset, pain is gradually worsening  Current Pain Score: 8/10  Typical Range: 7-10/10  Timing: constant  Quality: burning, throbbing  Radiation: yes, down the back of both legs  Associated numbness or weakness: no numbness, no weakness   Exacerbated by: standing, walking, bending  Allievated by: rest, sitting, medications, laying down.   Is Pain Level Acceptable?: No    Previous Therapies:  PT/OT: yes, in the past  HEP:   Interventions:   Surgery:  Medications:   - NSAIDS:   - MSK Relaxants:   - TCAs:   - SNRIs:   - Topicals:   - Anticonvulsants: gabapentin  - Opioids:     History:    Current Outpatient Medications:     acetaminophen (TYLENOL ARTHRITIS PAIN ORAL), Take by mouth., Disp: , Rfl:     dorzolamide-timolol 2-0.5% (COSOPT) 22.3-6.8 mg/mL ophthalmic solution, INSTILL 1 DROP INTO BOTH EYES TWICE A DAY, Disp: 30 mL, Rfl: 4    ezetimibe (ZETIA) 10 mg tablet, Take 1 tablet (10 mg total) by mouth once daily., Disp: 90 tablet, Rfl: 3    gabapentin (NEURONTIN) 300 MG capsule, Take 300 mg by mouth 3 (three) times daily. , Disp: , Rfl: 2    latanoprost 0.005 % ophthalmic solution, PLACE 1 DROP INTO BOTH EYES EVERY EVENING. Ok to dispense 90 day supply, Disp: 2.5 mL, Rfl: 12    MULTIVITAMIN W-MINERALS/LUTEIN (CENTRUM SILVER ORAL), Take by mouth., Disp: , Rfl:     vitamin D 1000 units Tab, Take 1,000 Units by mouth once daily., Disp: , Rfl:     Past Medical History:   Diagnosis Date    Arthritis     Cancer     skin cancer    Cataract     Done OU    Episode of hypertension 12/28/2012    pt states she does not have, Maybe white coat  syndrome    GERD (gastroesophageal reflux disease)     Glaucoma     suspect    Hyperlipidemia     Ocular hypertension     Spinal stenosis        Past Surgical History:   Procedure Laterality Date    APPENDECTOMY      CATARACT EXTRACTION W/  INTRAOCULAR LENS IMPLANT Right 2015    Dr Engel    CATARACT EXTRACTION W/  INTRAOCULAR LENS IMPLANT Left 2020    Dr Francois    COLONOSCOPY      LUMBAR EPIDURAL INJECTION      MOUTH SURGERY      dental implants    PHACOEMULSIFICATION OF CATARACT Left 2020    Procedure: PHACOEMULSIFICATION, CATARACT;  Surgeon: Francis Francois Jr., MD;  Location: [facility];  Service: Ophthalmology;  Laterality: Left;  Left       Family History   Problem Relation Age of Onset    Diabetes Mother     Heart attack Mother     Cancer Maternal Aunt         ovarian    Heart attack Father     Arthritis Father     Heart disease Father          at age 68    Heart attack Sister     Heart attack Brother     Amblyopia Neg Hx     Blindness Neg Hx     Cataracts Neg Hx     Hypertension Neg Hx     Macular degeneration Neg Hx     Retinal detachment Neg Hx     Strabismus Neg Hx     Stroke Neg Hx     Thyroid disease Neg Hx     Glaucoma Neg Hx        Social History     Socioeconomic History    Marital status:      Spouse name: Not on file    Number of children: Not on file    Years of education: Not on file    Highest education level: Not on file   Occupational History    Not on file   Social Needs    Financial resource strain: Not on file    Food insecurity     Worry: Not on file     Inability: Not on file    Transportation needs     Medical: Not on file     Non-medical: Not on file   Tobacco Use    Smoking status: Never Smoker    Smokeless tobacco: Never Used   Substance and Sexual Activity    Alcohol use: Yes     Alcohol/week: 2.0 standard drinks     Types: 2 Glasses of wine per week    Drug use: No    Sexual activity: Not on file   Lifestyle  "   Physical activity     Days per week: Not on file     Minutes per session: Not on file    Stress: Not on file   Relationships    Social connections     Talks on phone: Not on file     Gets together: Not on file     Attends Buddhism service: Not on file     Active member of club or organization: Not on file     Attends meetings of clubs or organizations: Not on file     Relationship status: Not on file   Other Topics Concern    Not on file   Social History Narrative    Not on file       Review of patient's allergies indicates:   Allergen Reactions    Cymbalta [duloxetine] Nausea And Vomiting    Polysporin [bacitracin-polymyxin b] Rash    Statins-hmg-coa reductase inhibitors      "muscles freezing and could not use them"    Codeine Nausea And Vomiting    Gamma immune glob from whey Other (See Comments)     shakes    Lidocaine Itching    Penicillins Hives    Sulfa (sulfonamide antibiotics) Swelling    Bacitracin Rash       Review of Systems:  General ROS: negative for - fever  Psychological ROS: negative for - hostility  Hematological and Lymphatic ROS: negative for - bleeding problems  Endocrine ROS: negative for - unexpected weight changes  Respiratory ROS: no cough, shortness of breath, or wheezing  Cardiovascular ROS: no chest pain or dyspnea on exertion  Gastrointestinal ROS: no abdominal pain, change in bowel habits, or black or bloody stools  Musculoskeletal ROS: negative for - muscular weakness  Neurological ROS: negative for - bowel and bladder control changes or numbness/tingling  Dermatological ROS: negative for rash    Physical Exam:  Vitals:    11/03/20 1516   BP: (!) 176/76   Pulse: 110   Temp: 98.5 °F (36.9 °C)   TempSrc: Temporal   SpO2: 95%   Weight: 72.4 kg (159 lb 9.8 oz)   Height: 5' 4" (1.626 m)   PainSc:   7   PainLoc: Back     Body mass index is 27.4 kg/m².     Gen: NAD  Gait: gait intact  Psych:  Mood appropriate for given condition  HEENT: eyes anicteric   GI: Abd soft  CV: " RRR  Lungs: breathing unlabored   ROM: limited AROM of the L spine in all planes, full ROM at ankles, knees and hips  Lumbar flexion 90 degrees, extension 50 degrees, side bending 30 degrees.    Sensation: intact to light touch in all dermatomes tested from L2-S1 bilaterally  Reflexes: 0/0 b/l patella and achilles  Palpation:  -TTP over the b/l greater trochanters and bilateral SI joint  Tone: normal in the b/l knees and hips   Skin: intact  Extremities: No edema in b/l ankles or hands  Provacative tests: + b/l axial facet loading       Right Left   L2/3 Iliacus Hip flexion  5-  5   L3/4 Qudratus Femoris Knee Extension  5  5   L4/5 Tib Anterior Ankle Dorsiflexion   5  5   L5/S1 Extensor Hallicus Longus Great toe extension  5  5                 S1/S2 Gastroc/Soleus Plantar Flexion  5  5       Imaging:  MRI lumbar spine 5/17/19  FINDINGS:  The conus ends at T12-L1.  No diffuse abnormal marrow or central conus signal is appreciated. No paraspinal fluid collections are appreciated. Osseous alignment appears maintained. There is multilevel facet, ligamentous hypertrophy mid to lower lumbar predominantly similar as well as maintained alignment demonstrating multilevel disc space narrowing, desiccation and marginal anterolisthesis L4-5.  There is Modic endplate degeneration at L2-3 slightly increased overall.  No interval extruded  type fragment is appreciated.     L1-2 demonstrates some broad-based concentric disc bulging, thecal sac triangulation as well as mild lateral recess, inferior neural foraminal narrowing bilaterally.  L2-3 demonstrates broad-based concentric disc bulging with thecal sac triangulation as well as moderate neural foraminal narrowing bilaterally right slightly more so than left.  L3-4 demonstrates broad-based concentric disc bulging with thecal sac triangulation, narrowing overall.  There is some annular fissuring as well as moderate left, moderate to significant right neural foraminal  narrowing with some posterior element hypertrophy, spurring.  L4-5 demonstrates broad-based concentric disc bulging with annular fissuring.  There is moderate neural foraminal narrowing demonstrated bilaterally with spinal canal narrowing overall along with exuberant posterior element, ligamentous hypertrophy.  L5-S1 demonstrates some broad-based concentric disc bulging with annular fissuring and small to moderate central protrusion.  There is significant neural foraminal narrowing demonstrated on the left as well as moderate right.    Labs:  BMP  Lab Results   Component Value Date     07/14/2020    K 4.3 07/14/2020     07/14/2020    CO2 27 07/14/2020    BUN 11 07/14/2020    CREATININE 0.9 07/14/2020    CALCIUM 9.8 07/14/2020    ANIONGAP 9 07/14/2020    ESTGFRAFRICA >60 07/14/2020    EGFRNONAA 59 (A) 07/14/2020     Lab Results   Component Value Date    ALT 13 06/24/2020    AST 18 06/24/2020    ALKPHOS 72 06/24/2020    BILITOT 0.3 06/24/2020       Assessment:   Problem List Items Addressed This Visit        Neuro    Degeneration of intervertebral disc of lumbosacral region      Other Visit Diagnoses     Lumbar radiculopathy    -  Primary    Screening procedure        Relevant Orders    COVID-19 Routine Screening    Spinal stenosis of lumbar region with neurogenic claudication        Chronic bilateral low back pain with bilateral sciatica            85 y.o. year old female presents to the office with back pain.  She's had chronic lower back pain for the past 8 years that has been gradually worsening.  Today her pain is 8/10, constant, burning, throbbing, radiating down the back of both of her legs.  She denies numbness, weakness, bowel/bladder dysfunction.  Her pain is worse with standing, walking, bending, and lifting and relieved with rest and sitting.  She has been taking gabapentin with mild relief.  On exam she has pain limited 5-/5 weakness of right hip flexion and + bilateral axial facet loading.  I  independently reviewed her lumbar MRI and it is c/w multilevel bilateral facet arthropathy and severe central canal stenosis at L4-5.  She describes neurogenic claudication.  She has seen Dr. Cristina but surgery not recommended at this time.  She has done PT in the past but has some limitations now due to living at [facility] and [place] [address].  Her pain is limiting her mobility and interfering with her ADL's.  We will schedule for lumbar LENY.  Follow up 2 weeks post injection.    Treatment Plan:   Procedures: L5/S1 ILESI. Procedure explained using an anatomical model.  Risks, benefits, alternatives explained to patient who verbalized understanding, including increased risk of infection, bleeding, need for additional procedures or surgery, and nerve damage.  Questions regarding the procedure, risks, expected outcome, and possible side effects were solicited and answered to the patient's satisfaction.  Libby wishes to proceed with the injection.  Verbal and written consent were obtained in clinic today.  PT/OT/HEP: I've given her some home exercises to start  Medications: continue medications as prescribed.  Can consider low dose opioid depending on how she responds to LENY  Labs: Reviewed and medications are appropriately dosed for current hepatorenal function.  Imaging: No additional recommended at this time.    : Not applicable    Cachorro Figueroa M.D.  Interventional Pain Medicine / Anesthesiology

## 2020-11-03 NOTE — LETTER
November 3, 2020      Cristine Hidalgo PA-C  [address] [facility] [address] [place] [address]           [facility]  [address] [place] [address]  Phone: [phone]  Fax: [phone]          Patient: Libby Estrada   MR Number: [id number]   YOB: 1935   Date of Visit: 11/3/2020       Dear Cristine Hidalgo:    Thank you for referring Libby Estrada to me for evaluation. Attached you will find relevant portions of my assessment and plan of care.    If you have questions, please do not hesitate to call me. I look forward to following Libby Estrada along with you.    Sincerely,    Cachorro Figueroa MD    Enclosure  CC:  No Recipients    If you would like to receive this communication electronically, please contact [email or web address] or [phone] to request more information on [employer] [employer] access.    For providers and/or their staff who would like to refer a patient to [facility], please contact us through our one-stop-shop provider referral line, [facility], at [phone].    If you feel you have received this communication in error or would no longer like to receive these types of communications, please e-mail [email or web address]

## 2020-11-08 ENCOUNTER — LAB VISIT (OUTPATIENT)
Dept: FAMILY MEDICINE | Facility: CLINIC | Age: 85
End: 2020-11-08
Payer: MEDICARE

## 2020-11-08 DIAGNOSIS — Z13.9 SCREENING PROCEDURE: ICD-10-CM

## 2020-11-08 PROCEDURE — U0003 INFECTIOUS AGENT DETECTION BY NUCLEIC ACID (DNA OR RNA); SEVERE ACUTE RESPIRATORY SYNDROME CORONAVIRUS 2 (SARS-COV-2) (CORONAVIRUS DISEASE [COVID-19]), AMPLIFIED PROBE TECHNIQUE, MAKING USE OF HIGH THROUGHPUT TECHNOLOGIES AS DESCRIBED BY CMS-2020-01-R: HCPCS

## 2020-11-09 LAB — SARS-COV-2 RNA RESP QL NAA+PROBE: NOT DETECTED

## 2020-11-10 RX ORDER — EZETIMIBE 10 MG/1
TABLET ORAL
Qty: 90 TABLET | Refills: 2 | Status: SHIPPED | OUTPATIENT
Start: 2020-11-10 | End: 2021-07-27

## 2020-11-10 NOTE — PROGRESS NOTES
Refill Authorization Note   Libby Estrada is requesting a refill authorization.  Brief assessment and rationale for refill: Approve     Medication Therapy Plan: CDMR. approve     Medication reconciliation completed: No   Comments:       Requested Prescriptions   Pending Prescriptions Disp Refills    ezetimibe (ZETIA) 10 mg tablet [Pharmacy Med Name: ezetimibe 10 mg tablet] 90 tablet 2     Sig: TAKE ONE TABLET BY MOUTH ONCE DAILY       Cardiovascular:  Antilipid - Sterol Transport Inhibitors Passed - 11/10/2020  5:50 PM        Passed - Patient is at least 18 years old        Passed - Office visit in past 12 months or future 90 days     Recent Outpatient Visits            1 week ago Lumbar radiculopathy    [place] [facility] Cachorro Figueroa MD    1 week ago Degeneration of intervertebral disc of lumbosacral region    [facility] Cristine Hidalgo PA-C    3 weeks ago Borderline glaucoma of both eyes with ocular hypertension    [facility] Francis Francois Jr., MD    4 months ago Prediabetes    [facility] DOMINIC Salas MD    4 months ago Borderline glaucoma of both eyes with ocular hypertension    [facility] Francis Francois Jr., MD          Future Appointments              In 3 weeks MARILEE Arriaga [place] [facility], [place]    In 1 month DOMINIC Salas MD [facility]    In 3 months [facility] [place] [facility]                Passed - Total Cholesterol within 360 days     Cholesterol   Date Value Ref Range Status   06/24/2020 210 (H) 120 - 199 mg/dL Final     Comment:     The National Cholesterol Education Program (NCEP) has set the  following guidelines (reference ranges) for Cholesterol:  Optimal.....................<200 mg/dL  Borderline High.............200-239 mg/dL  High........................> or = 240 mg/dL     12/12/2019 251 (H) 120 - 199 mg/dL Final      Comment:     The National Cholesterol Education Program (NCEP) has set the  following guidelines (reference ranges) for Cholesterol:  Optimal.....................<200 mg/dL  Borderline High.............200-239 mg/dL  High........................> or = 240 mg/dL     06/11/2019 237 (H) 120 - 199 mg/dL Final     Comment:     The National Cholesterol Education Program (NCEP) has set the  following guidelines (reference ranges) for Cholesterol:  Optimal.....................<200 mg/dL  Borderline High.............200-239 mg/dL  High........................> or = 240 mg/dL                Passed - LDL within 360 days     LDL Calculated   Date Value Ref Range Status   06/25/2015 89 MG/DL Final     Comment:     Adult levels in terms of risk for coronary heart disease:  Optimal:_less than 100 MG/DL  Near to above Optimal:_100-129 MG/DL  Borderline High:_130-159 MG/DL  High:_160-189 MG/DL  Very High:_greater than 190 MG/DL       LDL Cholesterol   Date Value Ref Range Status   06/24/2020 119.8 63.0 - 159.0 mg/dL Final     Comment:     The National Cholesterol Education Program (NCEP) has set the  following guidelines (reference values) for LDL Cholesterol:  Optimal.......................<130 mg/dL  Borderline High...............130-159 mg/dL  High..........................160-189 mg/dL  Very High.....................>190 mg/dL              Passed - HDL within 360 days     HDL   Date Value Ref Range Status   06/24/2020 66 40 - 75 mg/dL Final     Comment:     The National Cholesterol Education Program (NCEP) has set the  following guidelines (reference values) for HDL Cholesterol:  Low...............<40 mg/dL  Optimal...........>60 mg/dL              Passed - Triglycerides within 360 days     Triglycerides   Date Value Ref Range Status   06/24/2020 121 30 - 150 mg/dL Final     Comment:     The National Cholesterol Education Program (NCEP) has set the  following guidelines (reference values) for  triglycerides:  Normal......................<150 mg/dL  Borderline High.............150-199 mg/dL  High........................200-499 mg/dL     12/12/2019 95 30 - 150 mg/dL Final     Comment:     The National Cholesterol Education Program (NCEP) has set the  following guidelines (reference values) for triglycerides:  Normal......................<150 mg/dL  Borderline High.............150-199 mg/dL  High........................200-499 mg/dL     06/11/2019 96 30 - 150 mg/dL Final     Comment:     The National Cholesterol Education Program (NCEP) has set the  following guidelines (reference values) for triglycerides:  Normal......................<150 mg/dL  Borderline High.............150-199 mg/dL  High........................200-499 mg/dL                Passed - AST is between 0 and 54 and within 360 days     AST   Date Value Ref Range Status   06/24/2020 18 10 - 40 U/L Final   12/12/2019 17 10 - 40 U/L Final   06/11/2019 16 10 - 40 U/L Final              Passed - ALT is between 0 and 94 and within 360 days     ALT   Date Value Ref Range Status   06/24/2020 13 10 - 44 U/L Final   12/12/2019 12 10 - 44 U/L Final   06/11/2019 12 10 - 44 U/L Final                  Appointments  past 12m or future 3m with PCP    Date Provider   Last Visit   6/30/2020 DOMINIC Salas MD   Next Visit   1/5/2021 ODMINIC Salas MD   ED visits in past 90 days: 0     Note composed:5:51 PM 11/10/2020

## 2020-11-10 NOTE — TELEPHONE ENCOUNTER
No new care gaps identified.  Powered by Track[employer]. Reference number: [id number]. 11/10/2020 8:43:21 AM   CST

## 2020-11-11 ENCOUNTER — HOSPITAL ENCOUNTER (OUTPATIENT)
Facility: HOSPITAL | Age: 85
Discharge: HOME | End: 2020-11-11
Attending: ANESTHESIOLOGY | Admitting: ANESTHESIOLOGY
Payer: MEDICARE

## 2020-11-11 ENCOUNTER — HOSPITAL ENCOUNTER (OUTPATIENT)
Dept: RADIOLOGY | Facility: HOSPITAL | Age: 85
Discharge: HOME | End: 2020-11-11
Attending: ANESTHESIOLOGY
Payer: MEDICARE

## 2020-11-11 DIAGNOSIS — M51.37 DEGENERATION OF INTERVERTEBRAL DISC OF LUMBOSACRAL REGION: ICD-10-CM

## 2020-11-11 DIAGNOSIS — M54.16 LUMBAR RADICULOPATHY: Primary | ICD-10-CM

## 2020-11-11 PROCEDURE — 62323 NJX INTERLAMINAR LMBR/SAC: CPT | Mod: ,,, | Performed by: ANESTHESIOLOGY

## 2020-11-11 PROCEDURE — 62323 PR INJ LUMBAR/SACRAL, W/IMAGING GUIDANCE: ICD-10-PCS | Mod: ,,, | Performed by: ANESTHESIOLOGY

## 2020-11-11 PROCEDURE — 25000003 PHARM REV CODE 250: Mod: PO | Performed by: ANESTHESIOLOGY

## 2020-11-11 PROCEDURE — 25500020 PHARM REV CODE 255: Mod: PO | Performed by: ANESTHESIOLOGY

## 2020-11-11 PROCEDURE — 62323 NJX INTERLAMINAR LMBR/SAC: CPT | Mod: PO | Performed by: ANESTHESIOLOGY

## 2020-11-11 PROCEDURE — 63600175 PHARM REV CODE 636 W HCPCS: Mod: PO | Performed by: ANESTHESIOLOGY

## 2020-11-11 PROCEDURE — 76000 FLUOROSCOPY <1 HR PHYS/QHP: CPT | Mod: TC,PO

## 2020-11-11 RX ORDER — SODIUM BICARBONATE 42 MG/ML
INJECTION, SOLUTION INTRAVENOUS
Status: DISCONTINUED | OUTPATIENT
Start: 2020-11-11 | End: 2020-11-11 | Stop reason: HOSPADM

## 2020-11-11 RX ORDER — SODIUM CHLORIDE, SODIUM LACTATE, POTASSIUM CHLORIDE, CALCIUM CHLORIDE 600; 310; 30; 20 MG/100ML; MG/100ML; MG/100ML; MG/100ML
INJECTION, SOLUTION INTRAVENOUS CONTINUOUS
Status: DISCONTINUED | OUTPATIENT
Start: 2020-11-11 | End: 2020-11-11 | Stop reason: HOSPADM

## 2020-11-11 RX ORDER — METHYLPREDNISOLONE ACETATE 80 MG/ML
INJECTION, SUSPENSION INTRA-ARTICULAR; INTRALESIONAL; INTRAMUSCULAR; SOFT TISSUE
Status: DISCONTINUED | OUTPATIENT
Start: 2020-11-11 | End: 2020-11-11 | Stop reason: HOSPADM

## 2020-11-11 RX ORDER — LIDOCAINE HYDROCHLORIDE 10 MG/ML
INJECTION, SOLUTION EPIDURAL; INFILTRATION; INTRACAUDAL; PERINEURAL
Status: DISCONTINUED | OUTPATIENT
Start: 2020-11-11 | End: 2020-11-11 | Stop reason: HOSPADM

## 2020-11-11 RX ORDER — MIDAZOLAM HYDROCHLORIDE 1 MG/ML
INJECTION INTRAMUSCULAR; INTRAVENOUS
Status: DISCONTINUED | OUTPATIENT
Start: 2020-11-11 | End: 2020-11-11 | Stop reason: HOSPADM

## 2020-11-11 NOTE — OP NOTE
"Procedure Note    Procedure Date: 11/11/2020    Procedure Performed:  L5/S1 lumbar interlaminar epidural steroid injection under fluoroscopy.    Indications: Patient has failed conservative therapy.      Pre-op diagnosis: Lumbar Radiculopathy    Post-op diagnosis: same    Physician: Cachorro Figueroa MD    IV Sedation medications: Moderate sedation was achieved with midazolam 2mg.  Continuous monitoring of EKG, blood pressure and pulse oximetry was provided by a registered nurse during the entire course of the procedure under my supervision and recorded in the patient's medical record.   Total time for sedation was 14 minutes.`     Medications injected: depomedrol 80mg, 1% Lidocaine 1ml, 2 mL sterile, preservative-free normal saline.    Local anesthetic used: 1% Lidocaine, 1 ml, 8.4% sodium bicarbonate 0.25ml    Estimated Blood Loss: none    Complications:  none    Technique:  The patient was interviewed in the holding area and Risks/Benefits were discussed, including, but not limited to, the possibility of new or different pain, bleeding or infection.   All questions were answered.  The patient understood and accepted risks.  Consent was verfied.  A time-out was taken to identify patient and procedure prior to starting the procedure. The patient was placed in the prone position on the fluoroscopy table. The area of the lumbar spine was prepped with Chloraprep and draped in a sterile manner. The L5/S1 interspace was identified and marked under AP fluoroscopy. The skin and subcutaneous tissues overlying the targeted interspace were anesthetized with 3-5 mL of 1% lidocaine using a 25G, 1.5" needle.  A 20G, 3.5" Tuohy epidural needle was directed toward the interspace under fluoroscopic guidance until the ligamentum flavum was engaged. From this point, a loss of resistance technique with a glass syringe and saline was used to identify entrance of the needle into the epidural space. Once loss of resistance was observed 1 mL " of contrast solution was injected. An appropriate epidurogram was noted.  A 4 mL mixture consisting of saline, 1 mL 1% Lidocaine and 80 mg of depomedrol was injected slowly and without resistance.  The needle was flushed with normal saline and removed. The contrast was seen to be displaced after injection. Patient was awake/responsive during all injections.  [facility] patient tolerated the procedure well [facility]  The patient was monitored after the procedure and was given post-procedure and discharge instructions to follow at home. The patient was discharged in a stable condition.    Event Time In   In Facility 1204   In Pre-Procedure 1228   Physician Available    Anesthesia Available    Pre-Op: Bedside Procedure Start    Pre-Op: Bedside Procedure Stop    Pre-Procedure Complete    Out of Pre-Procedure    Anesthesia Start    Anesthesia Start Data Collection    Setup Start    Setup Complete    In Room 1320   Prep Start    Procedure Prep Complete    Procedure Start 1326   Procedure Closing    Emergence    Procedure Finish 1333   Sedation Start 1319   Scope In    Extent Reached    Scope Out    Sedation End 1333   Out of Room    Cleanup Start    Cleanup Complete    Cosmetic Start    Cosmetic Stop    Pain Mgmt In Room    Pain Mgmt Out Room    In Recovery    Anesthesia Finish    Bedside Procedure Start    Bedside Procedure Stop    Recovery Care Complete    Out of Recovery    To Phase II    In Phase II    Pain Mgmt Recovery Start    Pain Mgmt Recovery Stop    Obs Rec Start    Obs Rec Stop    Phase II Care Complete    Out of Phase II    Procedural Care Complete    Discharge    Pain Follow Up Needed    Pain Follow Up Complete

## 2020-11-11 NOTE — INTERVAL H&P NOTE
The patient has been examined and the H&P has been reviewed:    I concur with the findings and no changes have occurred since H&P was written.  ASA 2, MP III    Active Hospital Problems    Diagnosis  POA    Lumbar radiculopathy [M54.16]  Yes      Resolved Hospital Problems   No resolved problems to display.

## 2020-11-11 NOTE — DISCHARGE SUMMARY
[facility]  Discharge Note  Short Stay    Admit Date: 11/11/2020    Discharge Date: 11/11/2020    Attending Physician: Cachorro Figueroa     Discharge Provider: Cachorro Figueroa    Diagnoses:  Active Hospital Problems    Diagnosis  POA    Lumbar radiculopathy [M54.16]  Yes      Resolved Hospital Problems   No resolved problems to display.       Discharged Condition: Good    Final Diagnoses: Lumbar radiculopathy [M54.16]    Disposition: Home or Self Care    Hospital Course: No complications, uneventful    Outcome of Hospitalization, Treatment, Procedure, or Surgery:  Patient was admitted for outpatient interventional pain management procedure. The patient tolerated the procedure well with no complications.    Follow up/Patient Instructions:  Follow up as scheduled in Pain Management office in 3-4 weeks.  Patient has received instructions and follow up date and time.    Medications:  Continue previous medications    Discharge Procedure Orders   Notify your health care provider if you experience any of the following:  temperature >100.4     Notify your health care provider if you experience any of the following:  persistent nausea and vomiting or diarrhea     Notify your health care provider if you experience any of the following:  severe uncontrolled pain     Notify your health care provider if you experience any of the following:  redness, tenderness, or signs of infection (pain, swelling, redness, odor or green/yellow discharge around incision site)     Notify your health care provider if you experience any of the following:  difficulty breathing or increased cough     Notify your health care provider if you experience any of the following:  severe persistent headache     Notify your health care provider if you experience any of the following:  worsening rash     Notify your health care provider if you experience any of the following:  persistent dizziness, light-headedness, or visual disturbances     Notify  your health care provider if you experience any of the following:  increased confusion or weakness     Activity as tolerated         Discharge Procedure Orders (must include Diet, Follow-up, Activity):   Discharge Procedure Orders (must include Diet, Follow-up, Activity)   Notify your health care provider if you experience any of the following:  temperature >100.4     Notify your health care provider if you experience any of the following:  persistent nausea and vomiting or diarrhea     Notify your health care provider if you experience any of the following:  severe uncontrolled pain     Notify your health care provider if you experience any of the following:  redness, tenderness, or signs of infection (pain, swelling, redness, odor or green/yellow discharge around incision site)     Notify your health care provider if you experience any of the following:  difficulty breathing or increased cough     Notify your health care provider if you experience any of the following:  severe persistent headache     Notify your health care provider if you experience any of the following:  worsening rash     Notify your health care provider if you experience any of the following:  persistent dizziness, light-headedness, or visual disturbances     Notify your health care provider if you experience any of the following:  increased confusion or weakness     Activity as tolerated

## 2020-11-12 VITALS
RESPIRATION RATE: 14 BRPM | HEART RATE: 83 BPM | SYSTOLIC BLOOD PRESSURE: 183 MMHG | OXYGEN SATURATION: 97 % | TEMPERATURE: 98 F | DIASTOLIC BLOOD PRESSURE: 86 MMHG

## 2020-11-14 ENCOUNTER — PATIENT MESSAGE (OUTPATIENT)
Dept: FAMILY MEDICINE | Facility: CLINIC | Age: 85
End: 2020-11-14

## 2020-11-24 ENCOUNTER — NURSE TRIAGE (OUTPATIENT)
Dept: ADMINISTRATIVE | Facility: CLINIC | Age: 85
End: 2020-11-24

## 2020-12-01 ENCOUNTER — OFFICE VISIT (OUTPATIENT)
Dept: PAIN MEDICINE | Facility: CLINIC | Age: 85
End: 2020-12-01
Payer: MEDICARE

## 2020-12-01 VITALS
HEART RATE: 77 BPM | WEIGHT: 155 LBS | TEMPERATURE: 98 F | SYSTOLIC BLOOD PRESSURE: 182 MMHG | OXYGEN SATURATION: 95 % | HEIGHT: 64 IN | DIASTOLIC BLOOD PRESSURE: 85 MMHG | BODY MASS INDEX: 26.46 KG/M2

## 2020-12-01 DIAGNOSIS — M79.10 MYALGIA: ICD-10-CM

## 2020-12-01 DIAGNOSIS — M54.16 LUMBAR RADICULOPATHY: Primary | ICD-10-CM

## 2020-12-01 DIAGNOSIS — M47.816 LUMBAR SPONDYLOSIS: ICD-10-CM

## 2020-12-01 DIAGNOSIS — M48.062 SPINAL STENOSIS OF LUMBAR REGION WITH NEUROGENIC CLAUDICATION: ICD-10-CM

## 2020-12-01 PROCEDURE — 99214 PR OFFICE/OUTPT VISIT, EST, LEVL IV, 30-39 MIN: ICD-10-PCS | Mod: S$PBB,,, | Performed by: NURSE PRACTITIONER

## 2020-12-01 PROCEDURE — 99213 OFFICE O/P EST LOW 20 MIN: CPT | Mod: PBBFAC,PN | Performed by: NURSE PRACTITIONER

## 2020-12-01 PROCEDURE — 99999 PR PBB SHADOW E&M-EST. PATIENT-LVL III: ICD-10-PCS | Mod: PBBFAC,,, | Performed by: NURSE PRACTITIONER

## 2020-12-01 PROCEDURE — 99214 OFFICE O/P EST MOD 30 MIN: CPT | Mod: S$PBB,,, | Performed by: NURSE PRACTITIONER

## 2020-12-01 PROCEDURE — 99999 PR PBB SHADOW E&M-EST. PATIENT-LVL III: CPT | Mod: PBBFAC,,, | Performed by: NURSE PRACTITIONER

## 2020-12-01 RX ORDER — BACLOFEN 10 MG/1
10 TABLET ORAL NIGHTLY PRN
Qty: 30 TABLET | Refills: 0 | Status: SHIPPED | OUTPATIENT
Start: 2020-12-01 | End: 2021-07-08

## 2020-12-01 NOTE — PROGRESS NOTES
[facility] Follow Up Evaluation    Referred by: Cristine Hidalgo PA-C  Reason for referral: back pain    CC:   No chief complaint on file.     Last 3 PDI Scores 12/1/2020 11/3/2020   Pain Disability Index (PDI) 26 18     Interval HPI 12/1/20: Mrs Estrada returns to clinic today for follow up.  She is s/p L5/S1 lumbar interlaminar epidural steroid injection on 11/11/20 with about 50% relief of her low back pain, she complains of b/l lower extremity cramping posteriorly to the calf, worse at night.  Her pain rating today is 510, worse with bending forward.  She denies numbness, weakness, bowel/bladder dysfunction. Continues HEP.  She continues gabapentin 300 mg t.i.d. and Tylenol Arthritis Strength b.i.d.     HPI:   Libby Estrada is a 85 y.o. female who complains of back pain    Onset: about 8 years  Inciting Event: none  Progression: since onset, pain is gradually worsening   Current Pain Score: 8/10  Typical Range: 7-10/10  Timing: constant  Quality: burning, throbbing  Radiation: yes, down the back of both legs  Associated numbness or weakness: no numbness, no weakness   Exacerbated by: standing, walking, bending  Allievated by: rest, sitting, medications, laying down.   Is Pain Level Acceptable?: No    Previous Therapies:  PT/OT: yes, in the past  HEP:   Interventions:   Surgery:  Medications:   - NSAIDS:   - MSK Relaxants:   - TCAs:   - SNRIs:   - Topicals:   - Anticonvulsants: gabapentin  - Opioids:     History:    Current Outpatient Medications:     acetaminophen (TYLENOL ARTHRITIS PAIN ORAL), Take by mouth., Disp: , Rfl:     dorzolamide-timolol 2-0.5% (COSOPT) 22.3-6.8 mg/mL ophthalmic solution, INSTILL 1 DROP INTO BOTH EYES TWICE A DAY, Disp: 30 mL, Rfl: 4    ezetimibe (ZETIA) 10 mg tablet, TAKE ONE TABLET BY MOUTH ONCE DAILY, Disp: 90 tablet, Rfl: 2    gabapentin (NEURONTIN) 300 MG capsule, Take 300 mg by mouth 3 (three) times daily. , Disp: , Rfl: 2    latanoprost 0.005 % ophthalmic solution,  PLACE 1 DROP INTO BOTH EYES EVERY EVENING. Ok to dispense 90 day supply, Disp: 2.5 mL, Rfl: 12    MULTIVITAMIN W-MINERALS/LUTEIN (CENTRUM SILVER ORAL), Take by mouth., Disp: , Rfl:     vitamin D 1000 units Tab, Take 1,000 Units by mouth once daily., Disp: , Rfl:     baclofen (LIORESAL) 10 MG tablet, Take 1 tablet (10 mg total) by mouth nightly as needed., Disp: 30 tablet, Rfl: 0    Past Medical History:   Diagnosis Date    Arthritis     Cancer     skin cancer    Cataract     Done OU    Episode of hypertension 2012    pt states she does not have, Maybe white coat syndrome    GERD (gastroesophageal reflux disease)     Glaucoma     suspect    Hyperlipidemia     Ocular hypertension     Spinal stenosis        Past Surgical History:   Procedure Laterality Date    APPENDECTOMY      CATARACT EXTRACTION W/  INTRAOCULAR LENS IMPLANT Right 2015    Dr Engel    CATARACT EXTRACTION W/  INTRAOCULAR LENS IMPLANT Left 2020    Dr Francois    COLONOSCOPY      EPIDURAL STEROID INJECTION INTO LUMBAR SPINE N/A 2020    Procedure: Injection-steroid-epidural-lumbar L5/S1;  Surgeon: Cachorro Figueroa MD;  Location: [facility] OR;  Service: Pain Management;  Laterality: N/A;    LUMBAR EPIDURAL INJECTION      MOUTH SURGERY      dental implants    PHACOEMULSIFICATION OF CATARACT Left 2020    Procedure: PHACOEMULSIFICATION, CATARACT;  Surgeon: Francis Francois Jr., MD;  Location: [facility] OR;  Service: Ophthalmology;  Laterality: Left;  Left       Family History   Problem Relation Age of Onset    Diabetes Mother     Heart attack Mother     Cancer Maternal Aunt         ovarian    Heart attack Father     Arthritis Father     Heart disease Father          at age 68    Heart attack Sister     Heart attack Brother     Amblyopia Neg Hx     Blindness Neg Hx     Cataracts Neg Hx     Hypertension Neg Hx     Macular degeneration Neg Hx     Retinal detachment Neg Hx     Strabismus Neg Hx      "Stroke Neg Hx     Thyroid disease Neg Hx     Glaucoma Neg Hx        Social History     Socioeconomic History    Marital status:      Spouse name: Not on file    Number of children: Not on file    Years of education: Not on file    Highest education level: Not on file   Occupational History    Not on file   Social Needs    Financial resource strain: Not on file    Food insecurity     Worry: Not on file     Inability: Not on file    Transportation needs     Medical: Not on file     Non-medical: Not on file   Tobacco Use    Smoking status: Never Smoker    Smokeless tobacco: Never Used   Substance and Sexual Activity    Alcohol use: Yes     Alcohol/week: 2.0 standard drinks     Types: 2 Glasses of wine per week    Drug use: No    Sexual activity: Not on file   Lifestyle    Physical activity     Days per week: Not on file     Minutes per session: Not on file    Stress: Not on file   Relationships    Social connections     Talks on phone: Not on file     Gets together: Not on file     Attends [employer] service: Not on file     Active member of club or organization: Not on file     Attends meetings of clubs or organizations: Not on file     Relationship status: Not on file   Other Topics Concern    Not on file   Social History Narrative    Not on file       Review of patient's allergies indicates:   Allergen Reactions    Cymbalta [duloxetine] Nausea And Vomiting    Polysporin [bacitracin-polymyxin b] Rash    Statins-hmg-coa reductase inhibitors      "muscles freezing and could not use them"    Codeine Nausea And Vomiting    Gamma immune glob from whey Other (See Comments)     shakes    Lidocaine Itching    Penicillins Hives    Sulfa (sulfonamide antibiotics) Swelling    Bacitracin Rash       Review of Systems:  General ROS: negative for - fever  Psychological ROS: negative for - hostility  Hematological and Lymphatic ROS: negative for - bleeding problems  Endocrine ROS: negative for - " "unexpected weight changes  Respiratory ROS: no cough, shortness of breath, or wheezing  Cardiovascular ROS: no chest pain or dyspnea on exertion  Gastrointestinal ROS: no abdominal pain, change in bowel habits, or black or bloody stools  Musculoskeletal ROS: negative for - muscular weakness  Neurological ROS: negative for - bowel and bladder control changes or numbness/tingling  Dermatological ROS: negative for rash    Physical Exam:  Vitals:    12/01/20 1031   BP: (!) 182/85   Pulse: 77   Temp: 98.2 °F (36.8 °C)   TempSrc: Temporal   SpO2: 95%   Weight: 70.3 kg (154 lb 15.7 oz)   Height: 5' 4" (1.626 m)   PainSc:   5   PainLoc: Back     Body mass index is 26.6 kg/m².     Gen: NAD  Gait: gait intact  Psych:  Mood appropriate for given condition  HEENT: eyes anicteric   GI: Abd soft  CV: RRR  Lungs: breathing unlabored   ROM: limited AROM of the L spine in all planes, full ROM at ankles, knees and hips  Lumbar flexion 90 degrees, extension 50 degrees, side bending 30 degrees.    Sensation: intact to light touch in all dermatomes tested from L2-S1 bilaterally  Reflexes: 2+ b/l patella and 0/0achilles  Palpation:  -TTP over the b/l greater trochanters and bilateral SI joint  Tone: normal in the b/l knees and hips   Skin: intact  Extremities: No edema in b/l ankles or hands  Provacative tests: + b/l axial facet loading       Right Left   L2/3 Iliacus Hip flexion  5  5   L3/4 Qudratus Femoris Knee Extension  5  5   L4/5 Tib Anterior Ankle Dorsiflexion   5  5   L5/S1 Extensor Hallicus Longus Great toe extension  5  5                 S1/S2 Gastroc/Soleus Plantar Flexion  5  5       Imaging:  MRI lumbar spine 5/17/19  FINDINGS:  The conus ends at T12-L1.  No diffuse abnormal marrow or central conus signal is appreciated. No paraspinal fluid collections are appreciated. Osseous alignment appears maintained. There is multilevel facet, ligamentous hypertrophy mid to lower lumbar predominantly similar as well as maintained " alignment demonstrating multilevel disc space narrowing, desiccation and marginal anterolisthesis L4-5.  There is Modic endplate degeneration at L2-3 slightly increased overall.  No interval extruded  type fragment is appreciated.     L1-2 demonstrates some broad-based concentric disc bulging, thecal sac triangulation as well as mild lateral recess, inferior neural foraminal narrowing bilaterally.  L2-3 demonstrates broad-based concentric disc bulging with thecal sac triangulation as well as moderate neural foraminal narrowing bilaterally right slightly more so than left.  L3-4 demonstrates broad-based concentric disc bulging with thecal sac triangulation, narrowing overall.  There is some annular fissuring as well as moderate left, moderate to significant right neural foraminal narrowing with some posterior element hypertrophy, spurring.  L4-5 demonstrates broad-based concentric disc bulging with annular fissuring.  There is moderate neural foraminal narrowing demonstrated bilaterally with spinal canal narrowing overall along with exuberant posterior element, ligamentous hypertrophy.  L5-S1 demonstrates some broad-based concentric disc bulging with annular fissuring and small to moderate central protrusion.  There is significant neural foraminal narrowing demonstrated on the left as well as moderate right.    Labs:  BMP  Lab Results   Component Value Date     07/14/2020    K 4.3 07/14/2020     07/14/2020    CO2 27 07/14/2020    BUN 11 07/14/2020    CREATININE 0.9 07/14/2020    CALCIUM 9.8 07/14/2020    ANIONGAP 9 07/14/2020    ESTGFRAFRICA >60 07/14/2020    EGFRNONAA 59 (A) 07/14/2020     Lab Results   Component Value Date    ALT 13 06/24/2020    AST 18 06/24/2020    ALKPHOS 72 06/24/2020    BILITOT 0.3 06/24/2020     Lab Results   Component Value Date    WBC 6.39 06/24/2020    HGB 12.6 06/24/2020    HCT 40.1 06/24/2020    MCV 94 06/24/2020     06/24/2020       Assessment:   Problem List  Items Addressed This Visit        Neuro    Lumbar radiculopathy - Primary      Other Visit Diagnoses     Myalgia        Relevant Medications    baclofen (LIORESAL) 10 MG tablet    Spinal stenosis of lumbar region with neurogenic claudication        Lumbar spondylosis            85 y.o. year old female presents to the office with back pain.  She's had chronic lower back pain for the past 8 years that has been gradually worsening.      12/1/20 - Mrs Estrada returns to clinic today for follow up.  She is s/p L5/S1 lumbar interlaminar epidural steroid injection on 11/11/20 with about 50% relief of her low back pain, she complains of b/l lower extremity cramping posteriorly to the calf, worse at night.  Her pain rating today is 510, worse with bending forward.  She denies numbness, weakness, bowel/bladder dysfunction.  She has done PT in the past but has some limitations now due to living at [facility] and [place] [id number].  Continues HEP.  She continues gabapentin 300 mg t.i.d. and Tylenol Arthritis Strength b.i.d.  On exam she has pain limited 5/5 bilateral lower extremity strength, sensation intact to light touch L2 through S1, 2+ b/l patella and 0/0achilles, + axial facet loading.  We reviewed her lumbar MRI together today, c/w multilevel bilateral facet arthropathy and severe central canal stenosis at L4-5.  She describes neurogenic claudication and facet mediated pain.  She has seen Dr. Cristina but surgery not recommended at this time.  We discussed the option of diagnostic medial branch block followed by RFA for the facet portion of her pain.  I have recommended a trial of baclofen for her nighttime muscle spasms.  I will call her in 1 week, if no relief of her leg cramping, we will recommend bilateral L4/5 transforaminal epidural steroid injection to provide further pain relief.    Treatment Plan:   Procedures: none  PT/OT/HEP: continue home exercises   Medications: continue medications as prescribed. Trial baclofen po  qhs prn muscle spasms  Labs: Reviewed and medications are appropriately dosed for current hepatorenal function.  Imaging: No additional recommended at this time.    : Not applicable    Attending Physician:  Cachorro Figueroa M.D.  Interventional Pain Medicine / Anesthesiology

## 2020-12-08 ENCOUNTER — TELEPHONE (OUTPATIENT)
Dept: PAIN MEDICINE | Facility: CLINIC | Age: 85
End: 2020-12-08

## 2020-12-08 NOTE — TELEPHONE ENCOUNTER
Spoke on the phone, continues to have bilateral leg cramping, and low back stiffness, no relief with baclofen and it causes foggy.  Best relief with blue emu rub.  Offered L4-5 transforaminal epidural for further relief, declines at this time, she is focusing caring on a sick dog, prefers to continue home exercise in the pool at [facility], she will keep scheduled follow  up on January 12th at which time she may consider repeat LENY.

## 2021-01-05 ENCOUNTER — OFFICE VISIT (OUTPATIENT)
Dept: FAMILY MEDICINE | Facility: CLINIC | Age: 86
End: 2021-01-05
Payer: MEDICARE

## 2021-01-05 VITALS
OXYGEN SATURATION: 97 % | HEART RATE: 84 BPM | SYSTOLIC BLOOD PRESSURE: 132 MMHG | HEIGHT: 64 IN | DIASTOLIC BLOOD PRESSURE: 84 MMHG | BODY MASS INDEX: 26.27 KG/M2 | TEMPERATURE: 98 F | WEIGHT: 153.88 LBS

## 2021-01-05 DIAGNOSIS — Z20.822 EXPOSURE TO COVID-19 VIRUS: ICD-10-CM

## 2021-01-05 DIAGNOSIS — E78.5 HYPERLIPIDEMIA, UNSPECIFIED HYPERLIPIDEMIA TYPE: Primary | ICD-10-CM

## 2021-01-05 DIAGNOSIS — R73.03 PREDIABETES: ICD-10-CM

## 2021-01-05 PROCEDURE — 99214 OFFICE O/P EST MOD 30 MIN: CPT | Mod: S$PBB,CS,, | Performed by: FAMILY MEDICINE

## 2021-01-05 PROCEDURE — 99214 PR OFFICE/OUTPT VISIT, EST, LEVL IV, 30-39 MIN: ICD-10-PCS | Mod: S$PBB,CS,, | Performed by: FAMILY MEDICINE

## 2021-01-05 PROCEDURE — 99999 PR PBB SHADOW E&M-EST. PATIENT-LVL IV: CPT | Mod: PBBFAC,,, | Performed by: FAMILY MEDICINE

## 2021-01-05 PROCEDURE — 99999 PR PBB SHADOW E&M-EST. PATIENT-LVL IV: ICD-10-PCS | Mod: PBBFAC,,, | Performed by: FAMILY MEDICINE

## 2021-01-05 PROCEDURE — U0003 INFECTIOUS AGENT DETECTION BY NUCLEIC ACID (DNA OR RNA); SEVERE ACUTE RESPIRATORY SYNDROME CORONAVIRUS 2 (SARS-COV-2) (CORONAVIRUS DISEASE [COVID-19]), AMPLIFIED PROBE TECHNIQUE, MAKING USE OF HIGH THROUGHPUT TECHNOLOGIES AS DESCRIBED BY CMS-2020-01-R: HCPCS

## 2021-01-05 PROCEDURE — 99214 OFFICE O/P EST MOD 30 MIN: CPT | Mod: PBBFAC,PO | Performed by: FAMILY MEDICINE

## 2021-01-06 LAB — SARS-COV-2 RNA RESP QL NAA+PROBE: NOT DETECTED

## 2021-01-12 ENCOUNTER — OFFICE VISIT (OUTPATIENT)
Dept: PAIN MEDICINE | Facility: CLINIC | Age: 86
End: 2021-01-12
Payer: MEDICARE

## 2021-01-12 ENCOUNTER — LAB VISIT (OUTPATIENT)
Dept: LAB | Facility: HOSPITAL | Age: 86
End: 2021-01-12
Attending: FAMILY MEDICINE
Payer: MEDICARE

## 2021-01-12 VITALS
SYSTOLIC BLOOD PRESSURE: 182 MMHG | OXYGEN SATURATION: 95 % | BODY MASS INDEX: 27.18 KG/M2 | HEIGHT: 64 IN | TEMPERATURE: 98 F | HEART RATE: 114 BPM | WEIGHT: 159.19 LBS | DIASTOLIC BLOOD PRESSURE: 85 MMHG

## 2021-01-12 DIAGNOSIS — E78.5 HYPERLIPIDEMIA, UNSPECIFIED HYPERLIPIDEMIA TYPE: ICD-10-CM

## 2021-01-12 DIAGNOSIS — M47.816 LUMBAR SPONDYLOSIS: ICD-10-CM

## 2021-01-12 DIAGNOSIS — M54.16 LUMBAR RADICULOPATHY: Primary | ICD-10-CM

## 2021-01-12 DIAGNOSIS — R73.03 PREDIABETES: ICD-10-CM

## 2021-01-12 LAB
ALBUMIN SERPL BCP-MCNC: 4.2 G/DL (ref 3.5–5.2)
ALP SERPL-CCNC: 73 U/L (ref 55–135)
ALT SERPL W/O P-5'-P-CCNC: 13 U/L (ref 10–44)
ANION GAP SERPL CALC-SCNC: 9 MMOL/L (ref 8–16)
AST SERPL-CCNC: 17 U/L (ref 10–40)
BASOPHILS # BLD AUTO: 0.06 K/UL (ref 0–0.2)
BASOPHILS NFR BLD: 1 % (ref 0–1.9)
BILIRUB SERPL-MCNC: 0.3 MG/DL (ref 0.1–1)
BUN SERPL-MCNC: 11 MG/DL (ref 8–23)
CALCIUM SERPL-MCNC: 9.4 MG/DL (ref 8.7–10.5)
CHLORIDE SERPL-SCNC: 103 MMOL/L (ref 95–110)
CHOLEST SERPL-MCNC: 208 MG/DL (ref 120–199)
CHOLEST/HDLC SERPL: 2.9 {RATIO} (ref 2–5)
CO2 SERPL-SCNC: 27 MMOL/L (ref 23–29)
CREAT SERPL-MCNC: 0.8 MG/DL (ref 0.5–1.4)
DIFFERENTIAL METHOD: NORMAL
EOSINOPHIL # BLD AUTO: 0.3 K/UL (ref 0–0.5)
EOSINOPHIL NFR BLD: 4.1 % (ref 0–8)
ERYTHROCYTE [DISTWIDTH] IN BLOOD BY AUTOMATED COUNT: 13.2 % (ref 11.5–14.5)
EST. GFR  (AFRICAN AMERICAN): >60 ML/MIN/1.73 M^2
EST. GFR  (NON AFRICAN AMERICAN): >60 ML/MIN/1.73 M^2
ESTIMATED AVG GLUCOSE: 120 MG/DL (ref 68–131)
GLUCOSE SERPL-MCNC: 108 MG/DL (ref 70–110)
HBA1C MFR BLD HPLC: 5.8 % (ref 4–5.6)
HCT VFR BLD AUTO: 39.6 % (ref 37–48.5)
HDLC SERPL-MCNC: 71 MG/DL (ref 40–75)
HDLC SERPL: 34.1 % (ref 20–50)
HGB BLD-MCNC: 12.7 G/DL (ref 12–16)
IMM GRANULOCYTES # BLD AUTO: 0.02 K/UL (ref 0–0.04)
IMM GRANULOCYTES NFR BLD AUTO: 0.3 % (ref 0–0.5)
LDLC SERPL CALC-MCNC: 109.6 MG/DL (ref 63–159)
LYMPHOCYTES # BLD AUTO: 1.4 K/UL (ref 1–4.8)
LYMPHOCYTES NFR BLD: 22.5 % (ref 18–48)
MCH RBC QN AUTO: 30 PG (ref 27–31)
MCHC RBC AUTO-ENTMCNC: 32.1 G/DL (ref 32–36)
MCV RBC AUTO: 93 FL (ref 82–98)
MONOCYTES # BLD AUTO: 0.5 K/UL (ref 0.3–1)
MONOCYTES NFR BLD: 7.5 % (ref 4–15)
NEUTROPHILS # BLD AUTO: 4.1 K/UL (ref 1.8–7.7)
NEUTROPHILS NFR BLD: 64.6 % (ref 38–73)
NONHDLC SERPL-MCNC: 137 MG/DL
NRBC BLD-RTO: 0 /100 WBC
PLATELET # BLD AUTO: 296 K/UL (ref 150–350)
PMV BLD AUTO: 9.8 FL (ref 9.2–12.9)
POTASSIUM SERPL-SCNC: 4.8 MMOL/L (ref 3.5–5.1)
PROT SERPL-MCNC: 8 G/DL (ref 6–8.4)
RBC # BLD AUTO: 4.24 M/UL (ref 4–5.4)
SODIUM SERPL-SCNC: 139 MMOL/L (ref 136–145)
TRIGL SERPL-MCNC: 137 MG/DL (ref 30–150)
TSH SERPL DL<=0.005 MIU/L-ACNC: 1.42 UIU/ML (ref 0.4–4)
WBC # BLD AUTO: 6.28 K/UL (ref 3.9–12.7)

## 2021-01-12 PROCEDURE — 99999 PR PBB SHADOW E&M-EST. PATIENT-LVL III: CPT | Mod: PBBFAC,,, | Performed by: NURSE PRACTITIONER

## 2021-01-12 PROCEDURE — 85025 COMPLETE CBC W/AUTO DIFF WBC: CPT

## 2021-01-12 PROCEDURE — 99213 OFFICE O/P EST LOW 20 MIN: CPT | Mod: PBBFAC,PN | Performed by: NURSE PRACTITIONER

## 2021-01-12 PROCEDURE — 80053 COMPREHEN METABOLIC PANEL: CPT

## 2021-01-12 PROCEDURE — 80061 LIPID PANEL: CPT

## 2021-01-12 PROCEDURE — 99214 OFFICE O/P EST MOD 30 MIN: CPT | Mod: S$PBB,,, | Performed by: NURSE PRACTITIONER

## 2021-01-12 PROCEDURE — 83036 HEMOGLOBIN GLYCOSYLATED A1C: CPT

## 2021-01-12 PROCEDURE — 84443 ASSAY THYROID STIM HORMONE: CPT

## 2021-01-12 PROCEDURE — 99999 PR PBB SHADOW E&M-EST. PATIENT-LVL III: ICD-10-PCS | Mod: PBBFAC,,, | Performed by: NURSE PRACTITIONER

## 2021-01-12 PROCEDURE — 36415 COLL VENOUS BLD VENIPUNCTURE: CPT | Mod: PO

## 2021-01-12 PROCEDURE — 99214 PR OFFICE/OUTPT VISIT, EST, LEVL IV, 30-39 MIN: ICD-10-PCS | Mod: S$PBB,,, | Performed by: NURSE PRACTITIONER

## 2021-02-08 ENCOUNTER — TELEPHONE (OUTPATIENT)
Dept: OPHTHALMOLOGY | Facility: CLINIC | Age: 86
End: 2021-02-08

## 2021-03-03 ENCOUNTER — PATIENT MESSAGE (OUTPATIENT)
Dept: FAMILY MEDICINE | Facility: CLINIC | Age: 86
End: 2021-03-03

## 2021-03-08 RX ORDER — GABAPENTIN 300 MG/1
300 CAPSULE ORAL 3 TIMES DAILY
Qty: 90 CAPSULE | Refills: 2 | Status: SHIPPED | OUTPATIENT
Start: 2021-03-08 | End: 2021-05-05

## 2021-03-19 ENCOUNTER — OFFICE VISIT (OUTPATIENT)
Dept: OPHTHALMOLOGY | Facility: CLINIC | Age: 86
End: 2021-03-19
Payer: MEDICARE

## 2021-03-19 DIAGNOSIS — H52.7 REFRACTIVE ERROR: ICD-10-CM

## 2021-03-19 DIAGNOSIS — Z96.1 PSEUDOPHAKIA: ICD-10-CM

## 2021-03-19 DIAGNOSIS — H40.053 BORDERLINE GLAUCOMA OF BOTH EYES WITH OCULAR HYPERTENSION: Primary | ICD-10-CM

## 2021-03-19 PROCEDURE — 92012 INTRM OPH EXAM EST PATIENT: CPT | Mod: S$PBB,,, | Performed by: OPHTHALMOLOGY

## 2021-03-19 PROCEDURE — 99213 OFFICE O/P EST LOW 20 MIN: CPT | Mod: PBBFAC,PO | Performed by: OPHTHALMOLOGY

## 2021-03-19 PROCEDURE — 99999 PR PBB SHADOW E&M-EST. PATIENT-LVL III: CPT | Mod: PBBFAC,,, | Performed by: OPHTHALMOLOGY

## 2021-03-19 PROCEDURE — 92012 PR EYE EXAM, EST PATIENT,INTERMED: ICD-10-PCS | Mod: S$PBB,,, | Performed by: OPHTHALMOLOGY

## 2021-03-19 PROCEDURE — 99999 PR PBB SHADOW E&M-EST. PATIENT-LVL III: ICD-10-PCS | Mod: PBBFAC,,, | Performed by: OPHTHALMOLOGY

## 2021-03-19 PROCEDURE — 92015 DETERMINE REFRACTIVE STATE: CPT | Mod: ,,, | Performed by: OPHTHALMOLOGY

## 2021-03-19 PROCEDURE — 92015 PR REFRACTION: ICD-10-PCS | Mod: ,,, | Performed by: OPHTHALMOLOGY

## 2021-05-05 RX ORDER — GABAPENTIN 300 MG/1
CAPSULE ORAL
Qty: 90 CAPSULE | Refills: 2 | Status: SHIPPED | OUTPATIENT
Start: 2021-05-05 | End: 2021-09-29

## 2021-07-08 ENCOUNTER — OFFICE VISIT (OUTPATIENT)
Dept: FAMILY MEDICINE | Facility: CLINIC | Age: 86
End: 2021-07-08
Payer: MEDICARE

## 2021-07-08 VITALS
DIASTOLIC BLOOD PRESSURE: 80 MMHG | WEIGHT: 158.06 LBS | BODY MASS INDEX: 26.98 KG/M2 | TEMPERATURE: 98 F | HEIGHT: 64 IN | SYSTOLIC BLOOD PRESSURE: 134 MMHG | OXYGEN SATURATION: 96 % | HEART RATE: 86 BPM

## 2021-07-08 DIAGNOSIS — E78.5 HYPERLIPIDEMIA, UNSPECIFIED HYPERLIPIDEMIA TYPE: ICD-10-CM

## 2021-07-08 DIAGNOSIS — R19.7 DIARRHEA, UNSPECIFIED TYPE: ICD-10-CM

## 2021-07-08 DIAGNOSIS — R73.03 PREDIABETES: Primary | ICD-10-CM

## 2021-07-08 PROCEDURE — 99214 OFFICE O/P EST MOD 30 MIN: CPT | Mod: PBBFAC,PO | Performed by: FAMILY MEDICINE

## 2021-07-08 PROCEDURE — 99214 OFFICE O/P EST MOD 30 MIN: CPT | Mod: S$PBB,,, | Performed by: FAMILY MEDICINE

## 2021-07-08 PROCEDURE — 99999 PR PBB SHADOW E&M-EST. PATIENT-LVL IV: ICD-10-PCS | Mod: PBBFAC,,, | Performed by: FAMILY MEDICINE

## 2021-07-08 PROCEDURE — 99999 PR PBB SHADOW E&M-EST. PATIENT-LVL IV: CPT | Mod: PBBFAC,,, | Performed by: FAMILY MEDICINE

## 2021-07-08 PROCEDURE — 99214 PR OFFICE/OUTPT VISIT, EST, LEVL IV, 30-39 MIN: ICD-10-PCS | Mod: S$PBB,,, | Performed by: FAMILY MEDICINE

## 2021-07-09 ENCOUNTER — LAB VISIT (OUTPATIENT)
Dept: LAB | Facility: HOSPITAL | Age: 86
End: 2021-07-09
Attending: FAMILY MEDICINE
Payer: MEDICARE

## 2021-07-09 DIAGNOSIS — R73.03 PREDIABETES: ICD-10-CM

## 2021-07-09 DIAGNOSIS — E78.5 HYPERLIPIDEMIA, UNSPECIFIED HYPERLIPIDEMIA TYPE: ICD-10-CM

## 2021-07-09 PROCEDURE — 85025 COMPLETE CBC W/AUTO DIFF WBC: CPT | Performed by: FAMILY MEDICINE

## 2021-07-09 PROCEDURE — 36415 COLL VENOUS BLD VENIPUNCTURE: CPT | Mod: PO | Performed by: FAMILY MEDICINE

## 2021-07-09 PROCEDURE — 80053 COMPREHEN METABOLIC PANEL: CPT | Performed by: FAMILY MEDICINE

## 2021-07-09 PROCEDURE — 84443 ASSAY THYROID STIM HORMONE: CPT | Performed by: FAMILY MEDICINE

## 2021-07-09 PROCEDURE — 83036 HEMOGLOBIN GLYCOSYLATED A1C: CPT | Performed by: FAMILY MEDICINE

## 2021-07-09 PROCEDURE — 80061 LIPID PANEL: CPT | Performed by: FAMILY MEDICINE

## 2021-07-10 LAB
ALBUMIN SERPL BCP-MCNC: 4.1 G/DL (ref 3.5–5.2)
ALP SERPL-CCNC: 60 U/L (ref 55–135)
ALT SERPL W/O P-5'-P-CCNC: 13 U/L (ref 10–44)
ANION GAP SERPL CALC-SCNC: 10 MMOL/L (ref 8–16)
AST SERPL-CCNC: 17 U/L (ref 10–40)
BASOPHILS # BLD AUTO: 0.05 K/UL (ref 0–0.2)
BASOPHILS NFR BLD: 0.9 % (ref 0–1.9)
BILIRUB SERPL-MCNC: 0.4 MG/DL (ref 0.1–1)
BUN SERPL-MCNC: 12 MG/DL (ref 8–23)
CALCIUM SERPL-MCNC: 10.2 MG/DL (ref 8.7–10.5)
CHLORIDE SERPL-SCNC: 101 MMOL/L (ref 95–110)
CHOLEST SERPL-MCNC: 215 MG/DL (ref 120–199)
CHOLEST/HDLC SERPL: 3.2 {RATIO} (ref 2–5)
CO2 SERPL-SCNC: 24 MMOL/L (ref 23–29)
CREAT SERPL-MCNC: 0.9 MG/DL (ref 0.5–1.4)
DIFFERENTIAL METHOD: NORMAL
EOSINOPHIL # BLD AUTO: 0.2 K/UL (ref 0–0.5)
EOSINOPHIL NFR BLD: 3.3 % (ref 0–8)
ERYTHROCYTE [DISTWIDTH] IN BLOOD BY AUTOMATED COUNT: 13 % (ref 11.5–14.5)
EST. GFR  (AFRICAN AMERICAN): >60 ML/MIN/1.73 M^2
EST. GFR  (NON AFRICAN AMERICAN): 58.9 ML/MIN/1.73 M^2
ESTIMATED AVG GLUCOSE: 117 MG/DL (ref 68–131)
GLUCOSE SERPL-MCNC: 99 MG/DL (ref 70–110)
HBA1C MFR BLD: 5.7 % (ref 4–5.6)
HCT VFR BLD AUTO: 38.4 % (ref 37–48.5)
HDLC SERPL-MCNC: 67 MG/DL (ref 40–75)
HDLC SERPL: 31.2 % (ref 20–50)
HGB BLD-MCNC: 12.6 G/DL (ref 12–16)
IMM GRANULOCYTES # BLD AUTO: 0.01 K/UL (ref 0–0.04)
IMM GRANULOCYTES NFR BLD AUTO: 0.2 % (ref 0–0.5)
LDLC SERPL CALC-MCNC: 119.8 MG/DL (ref 63–159)
LYMPHOCYTES # BLD AUTO: 1.3 K/UL (ref 1–4.8)
LYMPHOCYTES NFR BLD: 24.1 % (ref 18–48)
MCH RBC QN AUTO: 29.9 PG (ref 27–31)
MCHC RBC AUTO-ENTMCNC: 32.8 G/DL (ref 32–36)
MCV RBC AUTO: 91 FL (ref 82–98)
MONOCYTES # BLD AUTO: 0.5 K/UL (ref 0.3–1)
MONOCYTES NFR BLD: 9.8 % (ref 4–15)
NEUTROPHILS # BLD AUTO: 3.4 K/UL (ref 1.8–7.7)
NEUTROPHILS NFR BLD: 61.7 % (ref 38–73)
NONHDLC SERPL-MCNC: 148 MG/DL
NRBC BLD-RTO: 0 /100 WBC
PLATELET # BLD AUTO: 295 K/UL (ref 150–450)
PMV BLD AUTO: 9.9 FL (ref 9.2–12.9)
POTASSIUM SERPL-SCNC: 4.3 MMOL/L (ref 3.5–5.1)
PROT SERPL-MCNC: 7.6 G/DL (ref 6–8.4)
RBC # BLD AUTO: 4.21 M/UL (ref 4–5.4)
SODIUM SERPL-SCNC: 135 MMOL/L (ref 136–145)
TRIGL SERPL-MCNC: 141 MG/DL (ref 30–150)
TSH SERPL DL<=0.005 MIU/L-ACNC: 0.84 UIU/ML (ref 0.4–4)
WBC # BLD AUTO: 5.51 K/UL (ref 3.9–12.7)

## 2021-07-27 RX ORDER — EZETIMIBE 10 MG/1
TABLET ORAL
Qty: 90 TABLET | Refills: 3 | Status: SHIPPED | OUTPATIENT
Start: 2021-07-27 | End: 2022-09-12

## 2021-07-28 ENCOUNTER — OFFICE VISIT (OUTPATIENT)
Dept: OPHTHALMOLOGY | Facility: CLINIC | Age: 86
End: 2021-07-28
Payer: MEDICARE

## 2021-07-28 DIAGNOSIS — Z96.1 PSEUDOPHAKIA: ICD-10-CM

## 2021-07-28 DIAGNOSIS — H26.491 PCO (POSTERIOR CAPSULAR OPACIFICATION), RIGHT: ICD-10-CM

## 2021-07-28 DIAGNOSIS — H40.053 BORDERLINE GLAUCOMA OF BOTH EYES WITH OCULAR HYPERTENSION: Primary | ICD-10-CM

## 2021-07-28 PROCEDURE — 99999 PR PBB SHADOW E&M-EST. PATIENT-LVL III: ICD-10-PCS | Mod: PBBFAC,,, | Performed by: OPHTHALMOLOGY

## 2021-07-28 PROCEDURE — 99213 OFFICE O/P EST LOW 20 MIN: CPT | Mod: PBBFAC,PO | Performed by: OPHTHALMOLOGY

## 2021-07-28 PROCEDURE — 99214 PR OFFICE/OUTPT VISIT, EST, LEVL IV, 30-39 MIN: ICD-10-PCS | Mod: S$PBB,,, | Performed by: OPHTHALMOLOGY

## 2021-07-28 PROCEDURE — 99214 OFFICE O/P EST MOD 30 MIN: CPT | Mod: S$PBB,,, | Performed by: OPHTHALMOLOGY

## 2021-07-28 PROCEDURE — 99999 PR PBB SHADOW E&M-EST. PATIENT-LVL III: CPT | Mod: PBBFAC,,, | Performed by: OPHTHALMOLOGY

## 2021-09-23 ENCOUNTER — PROCEDURE VISIT (OUTPATIENT)
Dept: OPHTHALMOLOGY | Facility: CLINIC | Age: 86
End: 2021-09-23
Payer: MEDICARE

## 2021-09-23 DIAGNOSIS — H26.491 PCO (POSTERIOR CAPSULAR OPACIFICATION), RIGHT: Primary | ICD-10-CM

## 2021-09-23 PROCEDURE — 99499 NO LOS: ICD-10-PCS | Mod: S$PBB,,, | Performed by: OPHTHALMOLOGY

## 2021-09-23 PROCEDURE — 66821 AFTER CATARACT LASER SURGERY: CPT | Mod: PBBFAC,PO,RT | Performed by: OPHTHALMOLOGY

## 2021-09-23 PROCEDURE — 99499 UNLISTED E&M SERVICE: CPT | Mod: S$PBB,,, | Performed by: OPHTHALMOLOGY

## 2021-09-23 PROCEDURE — 66821 YAG CAPSULOTOMY - OD - RIGHT EYE: ICD-10-PCS | Mod: S$PBB,RT,, | Performed by: OPHTHALMOLOGY

## 2021-09-23 RX ORDER — PREDNISOLONE ACETATE 10 MG/ML
1 SUSPENSION/ DROPS OPHTHALMIC 4 TIMES DAILY
Qty: 1 BOTTLE | Refills: 1 | Status: SHIPPED | OUTPATIENT
Start: 2021-09-23 | End: 2022-07-26

## 2021-09-24 ENCOUNTER — TELEPHONE (OUTPATIENT)
Dept: OPHTHALMOLOGY | Facility: CLINIC | Age: 86
End: 2021-09-24

## 2021-09-24 ENCOUNTER — OFFICE VISIT (OUTPATIENT)
Dept: OPHTHALMOLOGY | Facility: CLINIC | Age: 86
End: 2021-09-24
Payer: MEDICARE

## 2021-09-24 DIAGNOSIS — S05.01XA ABRASION OF RIGHT CORNEA, INITIAL ENCOUNTER: Primary | ICD-10-CM

## 2021-09-24 PROCEDURE — 99024 PR POST-OP FOLLOW-UP VISIT: ICD-10-PCS | Mod: POP,,, | Performed by: OPHTHALMOLOGY

## 2021-09-24 PROCEDURE — 99213 OFFICE O/P EST LOW 20 MIN: CPT | Mod: PBBFAC,PO | Performed by: OPHTHALMOLOGY

## 2021-09-24 PROCEDURE — 99024 POSTOP FOLLOW-UP VISIT: CPT | Mod: POP,,, | Performed by: OPHTHALMOLOGY

## 2021-09-24 PROCEDURE — 99999 PR PBB SHADOW E&M-EST. PATIENT-LVL III: CPT | Mod: PBBFAC,,, | Performed by: OPHTHALMOLOGY

## 2021-09-24 PROCEDURE — 99999 PR PBB SHADOW E&M-EST. PATIENT-LVL III: ICD-10-PCS | Mod: PBBFAC,,, | Performed by: OPHTHALMOLOGY

## 2021-09-24 RX ORDER — OFLOXACIN 3 MG/ML
1 SOLUTION/ DROPS OPHTHALMIC 4 TIMES DAILY
Qty: 1 BOTTLE | Refills: 1 | Status: SHIPPED | OUTPATIENT
Start: 2021-09-24 | End: 2022-07-26

## 2021-09-28 ENCOUNTER — TELEPHONE (OUTPATIENT)
Dept: FAMILY MEDICINE | Facility: CLINIC | Age: 86
End: 2021-09-28

## 2021-09-29 RX ORDER — GABAPENTIN 300 MG/1
CAPSULE ORAL
Qty: 90 CAPSULE | Refills: 2 | Status: SHIPPED | OUTPATIENT
Start: 2021-09-29 | End: 2022-01-14

## 2021-09-29 RX ORDER — GABAPENTIN 300 MG/1
300 CAPSULE ORAL 3 TIMES DAILY
Qty: 90 CAPSULE | Refills: 2 | OUTPATIENT
Start: 2021-09-29

## 2021-10-01 ENCOUNTER — OFFICE VISIT (OUTPATIENT)
Dept: OPHTHALMOLOGY | Facility: CLINIC | Age: 86
End: 2021-10-01
Payer: MEDICARE

## 2021-10-01 DIAGNOSIS — H40.053 BORDERLINE GLAUCOMA OF BOTH EYES WITH OCULAR HYPERTENSION: ICD-10-CM

## 2021-10-01 DIAGNOSIS — H26.491 PCO (POSTERIOR CAPSULAR OPACIFICATION), RIGHT: ICD-10-CM

## 2021-10-01 DIAGNOSIS — S05.01XA ABRASION OF RIGHT CORNEA, INITIAL ENCOUNTER: Primary | ICD-10-CM

## 2021-10-01 PROCEDURE — 99999 PR PBB SHADOW E&M-EST. PATIENT-LVL III: CPT | Mod: PBBFAC,,, | Performed by: OPHTHALMOLOGY

## 2021-10-01 PROCEDURE — 99024 PR POST-OP FOLLOW-UP VISIT: ICD-10-PCS | Mod: POP,,, | Performed by: OPHTHALMOLOGY

## 2021-10-01 PROCEDURE — 99213 OFFICE O/P EST LOW 20 MIN: CPT | Mod: PBBFAC,PO | Performed by: OPHTHALMOLOGY

## 2021-10-01 PROCEDURE — 99999 PR PBB SHADOW E&M-EST. PATIENT-LVL III: ICD-10-PCS | Mod: PBBFAC,,, | Performed by: OPHTHALMOLOGY

## 2021-10-01 PROCEDURE — 99024 POSTOP FOLLOW-UP VISIT: CPT | Mod: POP,,, | Performed by: OPHTHALMOLOGY

## 2021-10-06 ENCOUNTER — OFFICE VISIT (OUTPATIENT)
Dept: OPHTHALMOLOGY | Facility: CLINIC | Age: 86
End: 2021-10-06
Payer: MEDICARE

## 2021-10-06 DIAGNOSIS — S05.01XA ABRASION OF RIGHT CORNEA, INITIAL ENCOUNTER: Primary | ICD-10-CM

## 2021-10-06 PROCEDURE — 99024 PR POST-OP FOLLOW-UP VISIT: ICD-10-PCS | Mod: POP,,, | Performed by: OPHTHALMOLOGY

## 2021-10-06 PROCEDURE — 99213 OFFICE O/P EST LOW 20 MIN: CPT | Mod: PBBFAC,PO | Performed by: OPHTHALMOLOGY

## 2021-10-06 PROCEDURE — 99999 PR PBB SHADOW E&M-EST. PATIENT-LVL III: CPT | Mod: PBBFAC,,, | Performed by: OPHTHALMOLOGY

## 2021-10-06 PROCEDURE — 99999 PR PBB SHADOW E&M-EST. PATIENT-LVL III: ICD-10-PCS | Mod: PBBFAC,,, | Performed by: OPHTHALMOLOGY

## 2021-10-06 PROCEDURE — 99024 POSTOP FOLLOW-UP VISIT: CPT | Mod: POP,,, | Performed by: OPHTHALMOLOGY

## 2021-11-18 ENCOUNTER — PATIENT MESSAGE (OUTPATIENT)
Dept: PAIN MEDICINE | Facility: CLINIC | Age: 86
End: 2021-11-18
Payer: MEDICARE

## 2021-11-18 ENCOUNTER — TELEPHONE (OUTPATIENT)
Dept: PAIN MEDICINE | Facility: CLINIC | Age: 86
End: 2021-11-18
Payer: MEDICARE

## 2021-11-22 ENCOUNTER — PATIENT OUTREACH (OUTPATIENT)
Dept: ADMINISTRATIVE | Facility: OTHER | Age: 86
End: 2021-11-22
Payer: MEDICARE

## 2021-11-23 ENCOUNTER — OFFICE VISIT (OUTPATIENT)
Dept: PAIN MEDICINE | Facility: CLINIC | Age: 86
End: 2021-11-23
Payer: MEDICARE

## 2021-11-23 VITALS
DIASTOLIC BLOOD PRESSURE: 84 MMHG | WEIGHT: 158.75 LBS | SYSTOLIC BLOOD PRESSURE: 170 MMHG | RESPIRATION RATE: 18 BRPM | BODY MASS INDEX: 27.25 KG/M2 | OXYGEN SATURATION: 97 % | TEMPERATURE: 98 F | HEART RATE: 87 BPM

## 2021-11-23 DIAGNOSIS — M54.9 DORSALGIA, UNSPECIFIED: ICD-10-CM

## 2021-11-23 DIAGNOSIS — M54.16 LUMBAR RADICULOPATHY: Primary | ICD-10-CM

## 2021-11-23 PROCEDURE — 99999 PR PBB SHADOW E&M-EST. PATIENT-LVL IV: CPT | Mod: PBBFAC,,,

## 2021-11-23 PROCEDURE — 99214 OFFICE O/P EST MOD 30 MIN: CPT | Mod: PBBFAC,PN

## 2021-11-23 PROCEDURE — 99214 OFFICE O/P EST MOD 30 MIN: CPT | Mod: S$PBB,,,

## 2021-11-23 PROCEDURE — 99999 PR PBB SHADOW E&M-EST. PATIENT-LVL IV: ICD-10-PCS | Mod: PBBFAC,,,

## 2021-11-23 PROCEDURE — 99214 PR OFFICE/OUTPT VISIT, EST, LEVL IV, 30-39 MIN: ICD-10-PCS | Mod: S$PBB,,,

## 2021-11-30 ENCOUNTER — OFFICE VISIT (OUTPATIENT)
Dept: OPHTHALMOLOGY | Facility: CLINIC | Age: 86
End: 2021-11-30
Payer: MEDICARE

## 2021-11-30 DIAGNOSIS — H52.7 REFRACTIVE ERROR: ICD-10-CM

## 2021-11-30 DIAGNOSIS — Z96.1 PSEUDOPHAKIA: ICD-10-CM

## 2021-11-30 DIAGNOSIS — H40.053 BORDERLINE GLAUCOMA OF BOTH EYES WITH OCULAR HYPERTENSION: Primary | ICD-10-CM

## 2021-11-30 PROCEDURE — 99024 POSTOP FOLLOW-UP VISIT: CPT | Mod: S$PBB,,, | Performed by: OPHTHALMOLOGY

## 2021-11-30 PROCEDURE — 99024 PR POST-OP FOLLOW-UP VISIT: ICD-10-PCS | Mod: S$PBB,,, | Performed by: OPHTHALMOLOGY

## 2021-11-30 PROCEDURE — 99999 PR PBB SHADOW E&M-EST. PATIENT-LVL III: ICD-10-PCS | Mod: PBBFAC,,, | Performed by: OPHTHALMOLOGY

## 2021-11-30 PROCEDURE — 99213 OFFICE O/P EST LOW 20 MIN: CPT | Mod: PBBFAC,PO | Performed by: OPHTHALMOLOGY

## 2021-11-30 PROCEDURE — 99999 PR PBB SHADOW E&M-EST. PATIENT-LVL III: CPT | Mod: PBBFAC,,, | Performed by: OPHTHALMOLOGY

## 2021-12-06 ENCOUNTER — PATIENT MESSAGE (OUTPATIENT)
Dept: PAIN MEDICINE | Facility: CLINIC | Age: 86
End: 2021-12-06
Payer: MEDICARE

## 2021-12-09 ENCOUNTER — OFFICE VISIT (OUTPATIENT)
Dept: PAIN MEDICINE | Facility: CLINIC | Age: 86
End: 2021-12-09
Payer: MEDICARE

## 2021-12-09 VITALS
WEIGHT: 156.19 LBS | DIASTOLIC BLOOD PRESSURE: 86 MMHG | BODY MASS INDEX: 26.67 KG/M2 | SYSTOLIC BLOOD PRESSURE: 181 MMHG | OXYGEN SATURATION: 95 % | RESPIRATION RATE: 18 BRPM | HEIGHT: 64 IN | HEART RATE: 70 BPM

## 2021-12-09 DIAGNOSIS — M47.816 LUMBAR SPONDYLOSIS: ICD-10-CM

## 2021-12-09 DIAGNOSIS — M54.16 LUMBAR RADICULOPATHY: Primary | ICD-10-CM

## 2021-12-09 PROCEDURE — 99999 PR PBB SHADOW E&M-EST. PATIENT-LVL V: CPT | Mod: PBBFAC,,,

## 2021-12-09 PROCEDURE — 99215 OFFICE O/P EST HI 40 MIN: CPT | Mod: PBBFAC,PN

## 2021-12-09 PROCEDURE — 99214 PR OFFICE/OUTPT VISIT, EST, LEVL IV, 30-39 MIN: ICD-10-PCS | Mod: S$PBB,,,

## 2021-12-09 PROCEDURE — 99214 OFFICE O/P EST MOD 30 MIN: CPT | Mod: S$PBB,,,

## 2021-12-09 PROCEDURE — 99999 PR PBB SHADOW E&M-EST. PATIENT-LVL V: ICD-10-PCS | Mod: PBBFAC,,,

## 2021-12-09 RX ORDER — ALPRAZOLAM 0.5 MG/1
1 TABLET, ORALLY DISINTEGRATING ORAL ONCE AS NEEDED
Status: CANCELLED | OUTPATIENT
Start: 2021-12-20 | End: 2033-05-17

## 2021-12-09 RX ORDER — BACLOFEN 10 MG/1
10 TABLET ORAL 3 TIMES DAILY
Qty: 60 TABLET | Refills: 1 | Status: SHIPPED | OUTPATIENT
Start: 2021-12-09 | End: 2022-06-09

## 2021-12-20 ENCOUNTER — HOSPITAL ENCOUNTER (OUTPATIENT)
Facility: HOSPITAL | Age: 86
Discharge: HOME | End: 2021-12-20
Attending: ANESTHESIOLOGY | Admitting: ANESTHESIOLOGY
Payer: MEDICARE

## 2021-12-20 ENCOUNTER — HOSPITAL ENCOUNTER (OUTPATIENT)
Dept: RADIOLOGY | Facility: HOSPITAL | Age: 86
Discharge: HOME | End: 2021-12-20
Attending: ANESTHESIOLOGY
Payer: MEDICARE

## 2021-12-20 VITALS
HEART RATE: 88 BPM | TEMPERATURE: 98 F | BODY MASS INDEX: 26.63 KG/M2 | RESPIRATION RATE: 18 BRPM | OXYGEN SATURATION: 98 % | WEIGHT: 156 LBS | DIASTOLIC BLOOD PRESSURE: 94 MMHG | SYSTOLIC BLOOD PRESSURE: 180 MMHG | HEIGHT: 64 IN

## 2021-12-20 DIAGNOSIS — M54.16 LUMBAR RADICULOPATHY: ICD-10-CM

## 2021-12-20 DIAGNOSIS — M54.16 LUMBAR RADICULOPATHY: Primary | ICD-10-CM

## 2021-12-20 PROCEDURE — 25000003 PHARM REV CODE 250: Mod: PO

## 2021-12-20 PROCEDURE — 62323 PR INJ LUMBAR/SACRAL, W/IMAGING GUIDANCE: ICD-10-PCS | Mod: ,,, | Performed by: ANESTHESIOLOGY

## 2021-12-20 PROCEDURE — 25500020 PHARM REV CODE 255: Mod: PO | Performed by: ANESTHESIOLOGY

## 2021-12-20 PROCEDURE — 76000 FLUOROSCOPY <1 HR PHYS/QHP: CPT | Mod: TC,PO

## 2021-12-20 PROCEDURE — 25000003 PHARM REV CODE 250: Mod: PO | Performed by: ANESTHESIOLOGY

## 2021-12-20 PROCEDURE — 62323 NJX INTERLAMINAR LMBR/SAC: CPT | Mod: ,,, | Performed by: ANESTHESIOLOGY

## 2021-12-20 PROCEDURE — 62323 NJX INTERLAMINAR LMBR/SAC: CPT | Mod: PO | Performed by: ANESTHESIOLOGY

## 2021-12-20 PROCEDURE — 63600175 PHARM REV CODE 636 W HCPCS: Mod: PO | Performed by: ANESTHESIOLOGY

## 2021-12-20 RX ORDER — LIDOCAINE HYDROCHLORIDE 10 MG/ML
INJECTION, SOLUTION EPIDURAL; INFILTRATION; INTRACAUDAL; PERINEURAL
Status: DISCONTINUED | OUTPATIENT
Start: 2021-12-20 | End: 2021-12-20 | Stop reason: HOSPADM

## 2021-12-20 RX ORDER — ALPRAZOLAM 0.5 MG/1
1 TABLET, ORALLY DISINTEGRATING ORAL ONCE AS NEEDED
Status: COMPLETED | OUTPATIENT
Start: 2021-12-20 | End: 2021-12-20

## 2021-12-20 RX ORDER — TRIAMCINOLONE ACETONIDE 40 MG/ML
INJECTION, SUSPENSION INTRA-ARTICULAR; INTRAMUSCULAR
Status: DISCONTINUED | OUTPATIENT
Start: 2021-12-20 | End: 2021-12-20 | Stop reason: HOSPADM

## 2021-12-20 RX ADMIN — ALPRAZOLAM 1 MG: 0.5 TABLET, ORALLY DISINTEGRATING ORAL at 08:12

## 2022-01-05 ENCOUNTER — OFFICE VISIT (OUTPATIENT)
Dept: PAIN MEDICINE | Facility: CLINIC | Age: 87
End: 2022-01-05
Payer: MEDICARE

## 2022-01-05 VITALS
BODY MASS INDEX: 26.93 KG/M2 | WEIGHT: 157.75 LBS | HEIGHT: 64 IN | HEART RATE: 83 BPM | DIASTOLIC BLOOD PRESSURE: 99 MMHG | SYSTOLIC BLOOD PRESSURE: 219 MMHG

## 2022-01-05 DIAGNOSIS — M48.062 SPINAL STENOSIS OF LUMBAR REGION WITH NEUROGENIC CLAUDICATION: ICD-10-CM

## 2022-01-05 DIAGNOSIS — M54.16 LUMBAR RADICULOPATHY: Primary | ICD-10-CM

## 2022-01-05 PROCEDURE — 99213 OFFICE O/P EST LOW 20 MIN: CPT | Mod: PBBFAC,PN | Performed by: ANESTHESIOLOGY

## 2022-01-05 PROCEDURE — 99999 PR PBB SHADOW E&M-EST. PATIENT-LVL III: CPT | Mod: PBBFAC,,, | Performed by: ANESTHESIOLOGY

## 2022-01-05 PROCEDURE — 99214 PR OFFICE/OUTPT VISIT, EST, LEVL IV, 30-39 MIN: ICD-10-PCS | Mod: S$PBB,,, | Performed by: ANESTHESIOLOGY

## 2022-01-05 PROCEDURE — 99999 PR PBB SHADOW E&M-EST. PATIENT-LVL III: ICD-10-PCS | Mod: PBBFAC,,, | Performed by: ANESTHESIOLOGY

## 2022-01-05 PROCEDURE — 99214 OFFICE O/P EST MOD 30 MIN: CPT | Mod: S$PBB,,, | Performed by: ANESTHESIOLOGY

## 2022-01-05 RX ORDER — METHOCARBAMOL 500 MG/1
500 TABLET, FILM COATED ORAL 2 TIMES DAILY PRN
Qty: 30 TABLET | Refills: 0 | Status: ON HOLD | OUTPATIENT
Start: 2022-01-05 | End: 2022-05-11 | Stop reason: HOSPADM

## 2022-01-05 NOTE — PROGRESS NOTES
[facility] Follow Up Evaluation    Referred by: Cristine Hidalgo PA-C  Reason for referral: back pain    CC:   Chief Complaint   Patient presents with    Low-back Pain    Neck Pain     Muscles front of neck stiffness/hurts to turn      Last 3 PDI Scores 11/23/2021 1/12/2021 12/1/2020   Pain Disability Index (PDI) 0 11 26       Interval HPI 1/5/22:  Ms. Estrada returns to the office for follow up.  She is s/p L5/S1 LENY on 12/20/21 with 50% relief.   Today she reports pain is 4/10, constant.  Her biggest complaint is cramping in her legs that is interfering most when she is trying to sleep.  No new numbness or weakness.  No bowel/bladder dysfunction.  She continues gabapentin.  She reports some episodes of dizziness and confusion.     Pain Intervention History:    - s/p L5/S1 LENY on 11/11/20 with about 50% relief of her low back pain.    HPI:   Libby Estrada is a 86 y.o. female who complains of back pain    Onset: about 8 years  Inciting Event: none  Progression: since onset, pain is gradually worsening   Current Pain Score: 6/10  Typical Range: 6-10/10  Timing: constant  Quality: burning, sharp, stabbing   Radiation: yes, down the back of both legs L>R  Associated numbness or weakness: no numbness, no weakness   Exacerbated by: standing, walking, bending  Allievated by: rest, sitting, medications, laying down.   Is Pain Level Acceptable?: No    Previous Therapies:  PT/OT: yes, in the past  HEP:   Interventions:   Surgery:  Medications:   - NSAIDS:   - MSK Relaxants: baclofen   - TCAs:   - SNRIs:   - Topicals:   - Anticonvulsants: gabapentin  - Opioids:     History:    Current Outpatient Medications:     acetaminophen (TYLENOL ARTHRITIS PAIN ORAL), Take by mouth., Disp: , Rfl:     baclofen (LIORESAL) 10 MG tablet, Take 1 tablet (10 mg total) by mouth 3 (three) times daily., Disp: 60 tablet, Rfl: 01    COVID-19 vacc,mRNA,Moderna,/PF (MODERNA COVID-19 VACCINE, EUA, IM), PHARMACY ADMINISTERED, Disp: , Rfl:      dorzolamide-timolol 2-0.5% (COSOPT) 22.3-6.8 mg/mL ophthalmic solution, USE ONE DROP IN EACH EYE TWICE DAILY. (50 DAY SUPPLY PER 10ML), Disp: 30 mL, Rfl: 3    ezetimibe (ZETIA) 10 mg tablet, TAKE ONE TABLET BY MOUTH ONCE DAILY, Disp: 90 tablet, Rfl: 3    gabapentin (NEURONTIN) 300 MG capsule, TAKE ONE CAPSULE (300MG) BY MOUTH THREE TIMES DAILY, Disp: 90 capsule, Rfl: 2    latanoprost 0.005 % ophthalmic solution, PLACE ONE DROP IN EACH EYE EVERY EVENING, Disp: 2.5 mL, Rfl: 12    MULTIVITAMIN W-MINERALS/LUTEIN (CENTRUM SILVER ORAL), Take by mouth., Disp: , Rfl:     vitamin D 1000 units Tab, Take 1,000 Units by mouth once daily., Disp: , Rfl:     methocarbamoL (ROBAXIN) 500 MG Tab, Take 1 tablet (500 mg total) by mouth 2 (two) times daily as needed (muscle spasms)., Disp: 30 tablet, Rfl: 0    Past Medical History:   Diagnosis Date    Arthritis     Cancer     skin cancer    Cataract     Done OU    Episode of hypertension 12/28/2012    pt states she does not have, Maybe white coat syndrome    GERD (gastroesophageal reflux disease)     Glaucoma     suspect    Hyperlipidemia     Ocular hypertension     Spinal stenosis        Past Surgical History:   Procedure Laterality Date    APPENDECTOMY      CATARACT EXTRACTION W/  INTRAOCULAR LENS IMPLANT Right 07/08/2015    Dr Engel    CATARACT EXTRACTION W/  INTRAOCULAR LENS IMPLANT Left 01/16/2020    Dr Francois    [facility]      EPIDURAL STEROID INJECTION INTO LUMBAR SPINE N/A 11/11/2020    Procedure: Injection-steroid-epidural-lumbar L5/S1;  Surgeon: Cachorro Figueroa MD;  Location: [facility] OR;  Service: Pain Management;  Laterality: N/A;    EPIDURAL STEROID INJECTION INTO LUMBAR SPINE N/A 12/20/2021    Procedure: Injection-steroid-epidural-lumbar L5/S1 to the left;  Surgeon: Cachorro Figueroa MD;  Location: [facility] OR;  Service: Pain Management;  Laterality: N/A;    LUMBAR EPIDURAL INJECTION      MOUTH SURGERY      dental implants    PHACOEMULSIFICATION OF  "CATARACT Left 2020    Procedure: PHACOEMULSIFICATION, CATARACT;  Surgeon: Francis Francois Jr., MD;  Location[facility];  Service: Ophthalmology;  Laterality: Left;  Left    Yag Capsulotomy Right 10/01/2021    Dr Francois       Family History   Problem Relation Age of Onset    Diabetes Mother     Heart attack Mother     Cancer Maternal Aunt         ovarian    Heart attack Father     Arthritis Father     Heart disease Father          at age 68    Heart attack Sister     Heart attack Brother     Amblyopia Neg Hx     Blindness Neg Hx     Cataracts Neg Hx     Hypertension Neg Hx     Macular degeneration Neg Hx     Retinal detachment Neg Hx     Strabismus Neg Hx     Stroke Neg Hx     Thyroid disease Neg Hx     Glaucoma Neg Hx        Social History     Socioeconomic History    Marital status:    Occupational History    Occupation: retired   Tobacco Use    Smoking status: Never Smoker    Smokeless tobacco: Never Used   Substance and Sexual Activity    Alcohol use: Yes     Alcohol/week: 2.0 standard drinks     Types: 2 Glasses of wine per week    Drug use: No    Sexual activity: Not Currently       Review of patient's allergies indicates:   Allergen Reactions    Cymbalta [duloxetine] Nausea And Vomiting    Polysporin [bacitracin-polymyxin b] Rash    Statins-hmg-coa reductase inhibitors      "muscles freezing and could not use them"    Codeine Nausea And Vomiting    Gamma immune glob from whey Other (See Comments)     shakes    Lidocaine Itching    Penicillins Hives    Sulfa (sulfonamide antibiotics) Swelling    Bacitracin Rash       Review of Systems:  General ROS: negative for - fever  Psychological ROS: negative for - hostility  Hematological and Lymphatic ROS: negative for - bleeding problems  Endocrine ROS: negative for - unexpected weight changes  Respiratory ROS: no cough, shortness of breath, or wheezing  Cardiovascular ROS: no chest pain or dyspnea on " "exertion  Gastrointestinal ROS: no abdominal pain, change in bowel habits, or black or bloody stools  Musculoskeletal ROS: negative for - muscular weakness  Neurological ROS: negative for - bowel and bladder control changes or numbness/tingling  Dermatological ROS: negative for rash    Physical Exam:  Vitals:    01/05/22 1053   BP: (!) 219/99   Pulse: 83   Weight: 71.6 kg (157 lb 11.8 oz)   Height: 5' 4" (1.626 m)   PainSc:   4   PainLoc: Back     Body mass index is 27.08 kg/m².     Gen: NAD, pleasant, well groomed  Gait: gait intact  Psych:  Mood appropriate for given condition  HEENT: eyes anicteric   GI: Abd soft  CV: RRR  Lungs: breathing unlabored   ROM: limited AROM of the L spine in all planes, full ROM at ankles, knees and hips  Lumbar flexion 90 degrees, extension 50 degrees, side bending 30 degrees.    Sensation: intact to light touch in all dermatomes tested from L2-S1 bilaterally  Reflexes: 0/0 b/l patella and 0/0 b/l achilles  Palpation:  -TTP over the b/l greater trochanters and bilateral SI joint  Tone: normal in the b/l knees and hips   Skin: intact  Extremities: No edema in b/l ankles or hands  Provacative tests:        Right Left   L2/3 Iliacus Hip flexion  5  5   L3/4 Qudratus Femoris Knee Extension  5  5   L4/5 Tib Anterior Ankle Dorsiflexion   5  5   L5/S1 Extensor Hallicus Longus Great toe extension  5  5                 S1/S2 Gastroc/Soleus Plantar Flexion  5  5       Imaging:  MRI lumbar spine 5/17/19  FINDINGS:  The conus ends at T12-L1.  No diffuse abnormal marrow or central conus signal is appreciated. No paraspinal fluid collections are appreciated. Osseous alignment appears maintained. There is multilevel facet, ligamentous hypertrophy mid to lower lumbar predominantly similar as well as maintained alignment demonstrating multilevel disc space narrowing, desiccation and marginal anterolisthesis L4-5.  There is Modic endplate degeneration at L2-3 slightly increased overall.  No interval " extruded  type fragment is appreciated.     L1-2 demonstrates some broad-based concentric disc bulging, thecal sac triangulation as well as mild lateral recess, inferior neural foraminal narrowing bilaterally.  L2-3 demonstrates broad-based concentric disc bulging with thecal sac triangulation as well as moderate neural foraminal narrowing bilaterally right slightly more so than left.  L3-4 demonstrates broad-based concentric disc bulging with thecal sac triangulation, narrowing overall.  There is some annular fissuring as well as moderate left, moderate to significant right neural foraminal narrowing with some posterior element hypertrophy, spurring.  L4-5 demonstrates broad-based concentric disc bulging with annular fissuring.  There is moderate neural foraminal narrowing demonstrated bilaterally with spinal canal narrowing overall along with exuberant posterior element, ligamentous hypertrophy.  L5-S1 demonstrates some broad-based concentric disc bulging with annular fissuring and small to moderate central protrusion.  There is significant neural foraminal narrowing demonstrated on the left as well as moderate right.    MRI lumbar spine 12/5/21  FINDINGS:  NOMENCLATURE: Five lumbar type vertebral bodies.     CORD/CAUDA EQUINA: Conus has normal size and signal and ends at a normal level of L1-L2.     ALIGNMENT: Trace retrolisthesis of L1 on L2, L2 on L3 and L3 on L4.  4 mm grade 1 anterolisthesis of L4 on L5.  levoscoliosis with a Segundo angle of 18.1 degrees using the superior T12 and inferior L5 endplates (series 8, image 9).     BONES: Vertebral body heights are maintained.  Multilevel endplate changes, greatest at L2-3 and L5-S1 where there are type 1 endplate changes.  Prominent type 2 endplate changes on the right at L3-4.  Hemangioma in the left T12 pedicle and S1 vertebral body.  No aggressive bone marrow signal.     PARASPINAL AREA: Normal.     LUMBAR DISC LEVELS:     T12-L1: No disc herniation or  significant posterior osteophytic ridging.  Ligamentum flavum thickening.  No significant spinal canal or foraminal stenosis.  L1-L2: Trace retrolisthesis.  Mild disc bulge.  Disc height loss.  Ligamentum flavum thickening.  Mild narrowing of the bilateral lateral recesses.  No significant spinal canal stenosis.  Mild bilateral foraminal stenosis.  L2-L3: Trace retrolisthesis.  Mild disc bulge.  Disc height loss.  Ligamentum flavum thickening.  Mild bilateral facet hypertrophy.  Mild right greater than left narrowing of the lateral recesses, minimally increased from prior study.  No significant spinal canal stenosis.  Mild-moderate right and mild left foraminal stenosis.  L3-L4: Trace retrolisthesis.  Mild disc bulge.  Disc height loss.  Bilateral facet hypertrophy.  Ligamentum flavum thickening.  Moderate right and mild-moderate left narrowing of the lateral recesses.  Mild spinal canal stenosis, minimally decreased from prior study.  Mild-moderate right and mild left foraminal stenosis.  This appears less pronounced on the right compared to prior study.  L4-L5: Anterolisthesis.  Unroofing of the disc and mild disc bulge.  Moderate narrowing of the bilateral lateral recesses.  Severe spinal canal stenosis, unchanged.  Mild bilateral foraminal stenosis, unchanged.  L5-S1: Mild disc bulge, eccentric to the left.  Moderate-marked left and mild right facet hypertrophy.  Ligamentum flavum thickening, greater on the left.  Moderate left and minimal right narrowing of the lateral recesses.  No significant spinal canal stenosis.  Moderate-severe left and minimal right foraminal stenosis.  This is progressed on the left compared to prior study.    Labs:  BMP  Lab Results   Component Value Date     (L) 07/09/2021    K 4.3 07/09/2021     07/09/2021    CO2 24 07/09/2021    BUN 12 07/09/2021    CREATININE 0.9 07/09/2021    CALCIUM 10.2 07/09/2021    ANIONGAP 10 07/09/2021    ESTGFRAFRICA >60.0 07/09/2021     EGFRNONAA 58.9 (A) 07/09/2021     Lab Results   Component Value Date    ALT 13 07/09/2021    AST 17 07/09/2021    ALKPHOS 60 07/09/2021    BILITOT 0.4 07/09/2021     Lab Results   Component Value Date    WBC 5.51 07/09/2021    HGB 12.6 07/09/2021    HCT 38.4 07/09/2021    MCV 91 07/09/2021     07/09/2021       Assessment:   Problem List Items Addressed This Visit        Neuro    Lumbar radiculopathy - Primary      Other Visit Diagnoses     Spinal stenosis of lumbar region with neurogenic claudication            86 y.o. year old female presents to the office with back pain.  She's had chronic lower back pain for the past 8 years that has been gradually worsening.      1/5/21 - Ms. Estrada returns to the office for follow up.  She is s/p L5/S1 LENY on 12/20/21 with 50% relief.   Today she reports pain is 4/10, constant.  Her biggest complaint is cramping in her legs that is interfering most when she is trying to sleep.  No new numbness or weakness.  No bowel/bladder dysfunction.  She continues gabapentin.  She reports some episodes of dizziness and confusion.     -on exam today she has full strength. Sensation: intact to light touch in all dermatomes tested from L2-S1 bilaterally, slightly reduced feeling in right foot compared to left. Reflexes: 0/0 b/l patella and 0/0 b/l achilles  - I independently reviewed her updated lumbar MRI and it is c/w L4-5 severe spinal canal stenosis, unchanged from 2019 and L5-S1 moderate left and minimal right narrowing of the lateral recesses, moderate-severe left and minimal right foraminal stenosis that has progressed on the left compared to prior study.  - she has responded decently to LENY and reports her pain is much more tolerable.  Her biggest complaint is cramping which is likely from the back.  I've prescribed her some robaxin to trial for spasms.  - she is active at [facility] where she lives but recently activity has been scaled back due to COVID.  She will resume her  pool aerobics and exercises as soon as she can  - as for her dizziness and confusion she is going to follow up with her PCP.  Her BP was elevated today, but she takes it at home and says she was around 120 systolic.  She will continue to monitor at home   - follow up in 8 weeks.  Can consider repeat LENY in the future vs referral to nsgy    : Not applicable

## 2022-01-10 ENCOUNTER — OFFICE VISIT (OUTPATIENT)
Dept: FAMILY MEDICINE | Facility: CLINIC | Age: 87
End: 2022-01-10
Payer: MEDICARE

## 2022-01-10 VITALS
OXYGEN SATURATION: 96 % | DIASTOLIC BLOOD PRESSURE: 94 MMHG | TEMPERATURE: 98 F | HEART RATE: 78 BPM | WEIGHT: 158.75 LBS | HEIGHT: 64 IN | SYSTOLIC BLOOD PRESSURE: 158 MMHG | BODY MASS INDEX: 27.1 KG/M2

## 2022-01-10 DIAGNOSIS — E78.5 HYPERLIPIDEMIA, UNSPECIFIED HYPERLIPIDEMIA TYPE: Primary | ICD-10-CM

## 2022-01-10 DIAGNOSIS — R55 SYNCOPE AND COLLAPSE: ICD-10-CM

## 2022-01-10 DIAGNOSIS — R41.0 CONFUSION: ICD-10-CM

## 2022-01-10 DIAGNOSIS — R42 DIZZINESS: ICD-10-CM

## 2022-01-10 DIAGNOSIS — R25.1 TREMOR OF BOTH HANDS: ICD-10-CM

## 2022-01-10 DIAGNOSIS — R73.03 PREDIABETES: ICD-10-CM

## 2022-01-10 PROCEDURE — 99215 OFFICE O/P EST HI 40 MIN: CPT | Mod: PBBFAC,PO | Performed by: FAMILY MEDICINE

## 2022-01-10 PROCEDURE — 99214 PR OFFICE/OUTPT VISIT, EST, LEVL IV, 30-39 MIN: ICD-10-PCS | Mod: S$PBB,,, | Performed by: FAMILY MEDICINE

## 2022-01-10 PROCEDURE — 99214 OFFICE O/P EST MOD 30 MIN: CPT | Mod: S$PBB,,, | Performed by: FAMILY MEDICINE

## 2022-01-10 PROCEDURE — 99999 PR PBB SHADOW E&M-EST. PATIENT-LVL V: CPT | Mod: PBBFAC,,, | Performed by: FAMILY MEDICINE

## 2022-01-10 PROCEDURE — 99999 PR PBB SHADOW E&M-EST. PATIENT-LVL V: ICD-10-PCS | Mod: PBBFAC,,, | Performed by: FAMILY MEDICINE

## 2022-01-10 NOTE — PROGRESS NOTES
Subjective:       Patient ID: Libby Estrada is a 86 y.o. female.    Chief Complaint: Transient Ischemic Attack    Here for 6 month f/u. About 2 wks ago she got confused when putting things in her storage unit at [facility]. She walked 20 feet and does not remember.  Also with dizziness off/on for months.  Also with some trembling in hands.  Was not drinking  She is not driving currently      Hyperlipidemia  This is a chronic problem. The current episode started more than 1 year ago. The problem is controlled. Pertinent negatives include no chest pain or shortness of breath.     Review of Systems   Constitutional: Negative for chills and fever.   Respiratory: Negative for cough, chest tightness and shortness of breath.    Cardiovascular: Negative for chest pain, palpitations and leg swelling.   Endocrine: Negative for cold intolerance and heat intolerance.   Psychiatric/Behavioral: Negative for decreased concentration. The patient is not nervous/anxious.        Objective:      Physical Exam  Vitals and nursing note reviewed.   Constitutional:       Appearance: She is well-developed.   HENT:      Head: Normocephalic and atraumatic.   Cardiovascular:      Rate and Rhythm: Normal rate and regular rhythm.      Heart sounds: Normal heart sounds.   Pulmonary:      Effort: Pulmonary effort is normal.      Breath sounds: Normal breath sounds.   Psychiatric:         Mood and Affect: Mood normal.         Behavior: Behavior normal.         Assessment:       1. Hyperlipidemia, unspecified hyperlipidemia type    2. Prediabetes    3. Confusion    4. Dizziness    5. Tremor of both hands    6. Syncope and collapse         Plan:       Hyperlipidemia, unspecified hyperlipidemia type  -     CBC Auto Differential; Future; Expected date: 01/10/2022  -     Comprehensive Metabolic Panel; Future; Expected date: 01/10/2022  -     TSH; Future; Expected date: 01/10/2022  -     Lipid Panel; Future; Expected date: 01/10/2022    Prediabetes  -      Hemoglobin A1C; Future; Expected date: 01/10/2022    Confusion  -     US Carotid Bilateral; Future; Expected date: 01/10/2022  -     Cancel: MRI Brain Without Contrast; Future; Expected date: 01/10/2022  -     MRI Brain W WO Contrast; Future; Expected date: 01/10/2022    Dizziness  -     US Carotid Bilateral; Future; Expected date: 01/10/2022  -     Cancel: MRI Brain Without Contrast; Future; Expected date: 01/10/2022  -     MRI Brain W WO Contrast; Future; Expected date: 01/10/2022    Tremor of both hands  -     US Carotid Bilateral; Future; Expected date: 01/10/2022  -     Cancel: MRI Brain Without Contrast; Future; Expected date: 01/10/2022  -     MRI Brain W WO Contrast; Future; Expected date: 01/10/2022    Syncope and collapse   -     US Carotid Bilateral; Future; Expected date: 01/10/2022  -     Cancel: MRI Brain Without Contrast; Future; Expected date: 01/10/2022  -     MRI Brain W WO Contrast; Future; Expected date: 01/10/2022    tia?  Seizure?  Lipid stable  Update labs  Will monitor chronic medical issues and continue current plan of care.  Home bp; states runs normal at home typically with some fluctuation    Follow up in about 2 weeks (around 1/24/2022), or if symptoms worsen or fail to improve, for Virtual Visit.

## 2022-01-11 ENCOUNTER — HOSPITAL ENCOUNTER (OUTPATIENT)
Dept: RADIOLOGY | Facility: HOSPITAL | Age: 87
Discharge: HOME | End: 2022-01-11
Attending: FAMILY MEDICINE
Payer: MEDICARE

## 2022-01-11 DIAGNOSIS — R25.1 TREMOR OF BOTH HANDS: ICD-10-CM

## 2022-01-11 DIAGNOSIS — R55 SYNCOPE AND COLLAPSE: ICD-10-CM

## 2022-01-11 DIAGNOSIS — R42 DIZZINESS: ICD-10-CM

## 2022-01-11 DIAGNOSIS — R41.0 CONFUSION: ICD-10-CM

## 2022-01-11 PROCEDURE — 93880 EXTRACRANIAL BILAT STUDY: CPT | Mod: TC,PO

## 2022-01-11 PROCEDURE — 93880 US CAROTID BILATERAL: ICD-10-PCS | Mod: 26,,, | Performed by: RADIOLOGY

## 2022-01-11 PROCEDURE — 93880 EXTRACRANIAL BILAT STUDY: CPT | Mod: 26,,, | Performed by: RADIOLOGY

## 2022-01-18 ENCOUNTER — TELEPHONE (OUTPATIENT)
Dept: FAMILY MEDICINE | Facility: CLINIC | Age: 87
End: 2022-01-18
Payer: MEDICARE

## 2022-01-18 ENCOUNTER — PATIENT MESSAGE (OUTPATIENT)
Dept: FAMILY MEDICINE | Facility: CLINIC | Age: 87
End: 2022-01-18
Payer: MEDICARE

## 2022-01-18 NOTE — TELEPHONE ENCOUNTER
----- Message from Jose Miguel Thompson, Patient Care Assistant sent at 1/18/2022  2:51 PM CST -----  Contact: Pt  Type: Needs Medical Advice    Who Called: Pt  Best Call Back Number: [phone]    Inquiry/Question: Pt is baldemar;ling to see if she can receive a phone call instead of a [facility] virtual visit due to her daughter will not be there to help and she is unable to operate the portal herself. Please call and advise. Thank you~

## 2022-01-20 ENCOUNTER — PATIENT MESSAGE (OUTPATIENT)
Dept: FAMILY MEDICINE | Facility: CLINIC | Age: 87
End: 2022-01-20

## 2022-01-20 ENCOUNTER — TELEPHONE (OUTPATIENT)
Dept: FAMILY MEDICINE | Facility: CLINIC | Age: 87
End: 2022-01-20

## 2022-01-20 ENCOUNTER — OFFICE VISIT (OUTPATIENT)
Dept: FAMILY MEDICINE | Facility: CLINIC | Age: 87
End: 2022-01-20
Payer: MEDICARE

## 2022-01-20 VITALS — DIASTOLIC BLOOD PRESSURE: 84 MMHG | SYSTOLIC BLOOD PRESSURE: 138 MMHG

## 2022-01-20 DIAGNOSIS — R41.0 CONFUSION: Primary | ICD-10-CM

## 2022-01-20 DIAGNOSIS — E87.1 HYPONATREMIA: ICD-10-CM

## 2022-01-20 PROCEDURE — 99213 OFFICE O/P EST LOW 20 MIN: CPT | Mod: 95,,, | Performed by: FAMILY MEDICINE

## 2022-01-20 PROCEDURE — 99213 PR OFFICE/OUTPT VISIT, EST, LEVL III, 20-29 MIN: ICD-10-PCS | Mod: 95,,, | Performed by: FAMILY MEDICINE

## 2022-01-20 NOTE — TELEPHONE ENCOUNTER
----- Message from Kizzy Raya sent at 1/20/2022  1:37 PM CST -----  Contact: MARCIAL GRAHAM [[id number]]  Patient is returning nurse's phone call, nurse Addie.  Please call patient back at [phone].

## 2022-01-20 NOTE — PROGRESS NOTES
Subjective:       Patient ID: Libby Estrada is a 87 y.o. female.    Chief Complaint: No chief complaint on file.    Video visit for 2 wk f/u post confusion.  In her normal state of health.  No more episodes.    Back Pain  This is a recurrent problem. The current episode started more than 1 year ago. The problem occurs daily. The problem has been gradually worsening since onset. The pain is present in the sacro-iliac. The quality of the pain is described as aching, shooting and stabbing. The pain radiates to the left foot, left thigh, right foot and right thigh. The pain is at a severity of 3/10. The pain is moderate. The pain is the same all the time. The symptoms are aggravated by standing, stress and twisting. Stiffness is present all day. Associated symptoms include headaches, leg pain, numbness, paresthesias, tingling and weakness. Pertinent negatives include no abdominal pain, bladder incontinence, bowel incontinence, chest pain, dysuria, paresis, pelvic pain or weight loss. The treatment provided moderate relief.   The patient location is: confused episode  The chief complaint leading to consultation is: f/u    Visit type: audiovisual    Face to Face time with patient: 8 minutes  9 minutes of total time spent on the encounter, which includes face to face time and non-face to face time preparing to see the patient (eg, review of tests), Obtaining and/or reviewing separately obtained history, Documenting clinical information in the electronic or other health record, Independently interpreting results (not separately reported) and communicating results to the patient/family/caregiver, or Care coordination (not separately reported).         Each patient to whom he or she provides medical services by telemedicine is:  (1) informed of the relationship between the physician and patient and the respective role of any other health care provider with respect to management of the patient; and (2) notified that he or she  may decline to receive medical services by telemedicine and may withdraw from such care at any time.    Notes:   Review of Systems   Constitutional: Negative for weight loss.   Cardiovascular: Negative for chest pain.   Gastrointestinal: Negative for abdominal pain and bowel incontinence.   Genitourinary: Negative for bladder incontinence, dysuria, hematuria and pelvic pain.   Musculoskeletal: Positive for back pain.   Neurological: Positive for tingling, weakness, numbness, headaches and paresthesias.       Objective:      Physical Exam  Constitutional:       Appearance: Normal appearance.   Neurological:      Mental Status: She is alert.   Psychiatric:         Mood and Affect: Mood normal.         Behavior: Behavior normal.         Assessment:       1. Confusion    2. Hyponatremia        Plan:       Confusion    Hyponatremia  -     Comprehensive Metabolic Panel; Future; Expected date: 01/20/2022      Recheck cmp in 2 wks due to lower than typical sodium  Reviewed mri and labs and stable    Follow up in about 1 month (around 2/20/2022), or if symptoms worsen or fail to improve.

## 2022-02-16 ENCOUNTER — LAB VISIT (OUTPATIENT)
Dept: LAB | Facility: HOSPITAL | Age: 87
End: 2022-02-16
Attending: FAMILY MEDICINE
Payer: MEDICARE

## 2022-02-16 DIAGNOSIS — E87.1 HYPONATREMIA: ICD-10-CM

## 2022-02-16 LAB
ALBUMIN SERPL BCP-MCNC: 3.9 G/DL (ref 3.5–5.2)
ALP SERPL-CCNC: 66 U/L (ref 55–135)
ALT SERPL W/O P-5'-P-CCNC: 15 U/L (ref 10–44)
ANION GAP SERPL CALC-SCNC: 10 MMOL/L (ref 8–16)
AST SERPL-CCNC: 17 U/L (ref 10–40)
BILIRUB SERPL-MCNC: 0.4 MG/DL (ref 0.1–1)
BUN SERPL-MCNC: 16 MG/DL (ref 8–23)
CALCIUM SERPL-MCNC: 9.9 MG/DL (ref 8.7–10.5)
CHLORIDE SERPL-SCNC: 102 MMOL/L (ref 95–110)
CO2 SERPL-SCNC: 27 MMOL/L (ref 23–29)
CREAT SERPL-MCNC: 0.8 MG/DL (ref 0.5–1.4)
EST. GFR  (AFRICAN AMERICAN): >60 ML/MIN/1.73 M^2
EST. GFR  (NON AFRICAN AMERICAN): >60 ML/MIN/1.73 M^2
GLUCOSE SERPL-MCNC: 110 MG/DL (ref 70–110)
POTASSIUM SERPL-SCNC: 4.8 MMOL/L (ref 3.5–5.1)
PROT SERPL-MCNC: 7.6 G/DL (ref 6–8.4)
SODIUM SERPL-SCNC: 139 MMOL/L (ref 136–145)

## 2022-02-16 PROCEDURE — 80053 COMPREHEN METABOLIC PANEL: CPT | Performed by: FAMILY MEDICINE

## 2022-02-16 PROCEDURE — 36415 COLL VENOUS BLD VENIPUNCTURE: CPT | Mod: PO | Performed by: FAMILY MEDICINE

## 2022-02-21 ENCOUNTER — OFFICE VISIT (OUTPATIENT)
Dept: FAMILY MEDICINE | Facility: CLINIC | Age: 87
End: 2022-02-21
Payer: MEDICARE

## 2022-02-21 VITALS
DIASTOLIC BLOOD PRESSURE: 70 MMHG | BODY MASS INDEX: 26.76 KG/M2 | SYSTOLIC BLOOD PRESSURE: 124 MMHG | OXYGEN SATURATION: 95 % | HEART RATE: 102 BPM | HEIGHT: 64 IN | WEIGHT: 156.75 LBS

## 2022-02-21 DIAGNOSIS — R73.03 PREDIABETES: ICD-10-CM

## 2022-02-21 DIAGNOSIS — M54.16 LUMBAR RADICULOPATHY: ICD-10-CM

## 2022-02-21 DIAGNOSIS — E78.5 HYPERLIPIDEMIA, UNSPECIFIED HYPERLIPIDEMIA TYPE: Primary | ICD-10-CM

## 2022-02-21 PROCEDURE — 99999 PR PBB SHADOW E&M-EST. PATIENT-LVL III: ICD-10-PCS | Mod: PBBFAC,,, | Performed by: FAMILY MEDICINE

## 2022-02-21 PROCEDURE — 99999 PR PBB SHADOW E&M-EST. PATIENT-LVL III: CPT | Mod: PBBFAC,,, | Performed by: FAMILY MEDICINE

## 2022-02-21 PROCEDURE — 99213 PR OFFICE/OUTPT VISIT, EST, LEVL III, 20-29 MIN: ICD-10-PCS | Mod: S$PBB,,, | Performed by: FAMILY MEDICINE

## 2022-02-21 PROCEDURE — 99213 OFFICE O/P EST LOW 20 MIN: CPT | Mod: S$PBB,,, | Performed by: FAMILY MEDICINE

## 2022-02-21 PROCEDURE — 99213 OFFICE O/P EST LOW 20 MIN: CPT | Mod: PBBFAC,PO | Performed by: FAMILY MEDICINE

## 2022-02-21 NOTE — PROGRESS NOTES
Subjective:       Patient ID: Libby Estrada is a 87 y.o. female.    Chief Complaint: Follow-up (Short memory loss   discuss lab results) and Low-back Pain    Here for f/u. No more episodes of confusion or such.  Doing well overall.  New issue of rash pain after using lidocaine. Had allergy before.    Review of Systems   Constitutional: Negative for chills and fever.   Respiratory: Negative for cough, chest tightness and shortness of breath.    Cardiovascular: Negative for chest pain, palpitations and leg swelling.   Endocrine: Negative for cold intolerance and heat intolerance.   Psychiatric/Behavioral: Negative for decreased concentration. The patient is not nervous/anxious.        Objective:      Physical Exam  Vitals and nursing note reviewed.   Constitutional:       Appearance: She is well-developed.   HENT:      Head: Normocephalic and atraumatic.   Cardiovascular:      Rate and Rhythm: Normal rate and regular rhythm.      Heart sounds: Normal heart sounds.   Pulmonary:      Effort: Pulmonary effort is normal.      Breath sounds: Normal breath sounds.   Psychiatric:         Mood and Affect: Mood normal.         Behavior: Behavior normal.         Assessment:       1. Hyperlipidemia, unspecified hyperlipidemia type    2. Prediabetes    3. Lumbar radiculopathy        Plan:       Hyperlipidemia, unspecified hyperlipidemia type    Prediabetes    Lumbar radiculopathy    lipid stable  Sugar stable  Will monitor chronic medical issues and continue current plan of care.  Hold lidocaine; f/u with pain management , may be candidate for ultram or similar    Follow up in about 3 months (around 5/21/2022), or if symptoms worsen or fail to improve.

## 2022-02-24 ENCOUNTER — OFFICE VISIT (OUTPATIENT)
Dept: PAIN MEDICINE | Facility: CLINIC | Age: 87
End: 2022-02-24
Payer: MEDICARE

## 2022-02-24 VITALS
DIASTOLIC BLOOD PRESSURE: 75 MMHG | HEART RATE: 88 BPM | OXYGEN SATURATION: 96 % | BODY MASS INDEX: 26.57 KG/M2 | WEIGHT: 155.63 LBS | SYSTOLIC BLOOD PRESSURE: 175 MMHG | HEIGHT: 64 IN

## 2022-02-24 DIAGNOSIS — G89.29 CHRONIC BILATERAL LOW BACK PAIN WITH BILATERAL SCIATICA: Primary | ICD-10-CM

## 2022-02-24 DIAGNOSIS — M47.816 LUMBAR SPONDYLOSIS: ICD-10-CM

## 2022-02-24 DIAGNOSIS — M54.16 LUMBAR RADICULOPATHY: Primary | ICD-10-CM

## 2022-02-24 DIAGNOSIS — M54.41 CHRONIC BILATERAL LOW BACK PAIN WITH BILATERAL SCIATICA: Primary | ICD-10-CM

## 2022-02-24 DIAGNOSIS — M54.42 CHRONIC BILATERAL LOW BACK PAIN WITH BILATERAL SCIATICA: Primary | ICD-10-CM

## 2022-02-24 PROCEDURE — 99999 PR PBB SHADOW E&M-EST. PATIENT-LVL III: ICD-10-PCS | Mod: PBBFAC,,,

## 2022-02-24 PROCEDURE — 99214 OFFICE O/P EST MOD 30 MIN: CPT | Mod: S$PBB,,,

## 2022-02-24 PROCEDURE — 99999 PR PBB SHADOW E&M-EST. PATIENT-LVL III: CPT | Mod: PBBFAC,,,

## 2022-02-24 PROCEDURE — 99214 PR OFFICE/OUTPT VISIT, EST, LEVL IV, 30-39 MIN: ICD-10-PCS | Mod: S$PBB,,,

## 2022-02-24 PROCEDURE — 99213 OFFICE O/P EST LOW 20 MIN: CPT | Mod: PBBFAC,PN

## 2022-02-24 RX ORDER — TRAMADOL HYDROCHLORIDE 50 MG/1
50 TABLET ORAL EVERY 8 HOURS PRN
Qty: 21 TABLET | Refills: 0 | Status: SHIPPED | OUTPATIENT
Start: 2022-02-24 | End: 2022-03-03

## 2022-02-24 NOTE — PROGRESS NOTES
[facility] Follow Up Evaluation    Referred by: Cristine Hidalgo PA-C  Reason for referral: back pain    CC:   Chief Complaint   Patient presents with    follow up      Last 3 PDI Scores 11/23/2021 1/12/2021 12/1/2020   Pain Disability Index (PDI) 0 11 26       Interval HPI 2/24/2022: Libby Estrada returns to the clinic for follow up.  Today she continues report low back pain, 8/10, with radiation down both legs.  She states she has good days and bad days prior pain can sometimes be intolerable, most often at nighttime. She recently had an episode of transient confusion, which she believes may have been a result of the previous epidural steroid injection.  She continues to take gabapentin, Tylenol and baclofen with only mild relief.  She has seen Neurosurgery in the past and is not currently interested in surgery at this time.  She denies any new numbness, weakness or changes with her bowel or bladder function.      Pain Intervention History:    - s/p L5/S1 LENY on 11/11/20 with about 50% relief of her low back pain.    HPI:   Libby Estrada is a 87 y.o. female who complains of back pain    Onset: about 8 years  Inciting Event: none  Progression: since onset, pain is gradually worsening   Current Pain Score: 8/10  Typical Range: 6-10/10  Timing: constant  Quality: burning, sharp, stabbing   Radiation: yes, down the back of both legs L>R  Associated numbness or weakness: no numbness, no weakness   Exacerbated by: standing, walking, bending  Allievated by: rest, sitting, medications, laying down.   Is Pain Level Acceptable?: No    Previous Therapies:  PT/OT: yes, in the past  HEP:   Interventions:   Surgery:  Medications:   - NSAIDS:   - MSK Relaxants: baclofen, Robaxin  - TCAs:   - SNRIs:   - Topicals:   - Anticonvulsants: gabapentin  - Opioids:     History:    Current Outpatient Medications:     acetaminophen (TYLENOL ARTHRITIS PAIN ORAL), Take by mouth., Disp: , Rfl:     baclofen (LIORESAL) 10 MG tablet,  Take 1 tablet (10 mg total) by mouth 3 (three) times daily., Disp: 60 tablet, Rfl: 01    COVID-19 vacc,mRNA,Moderna,/PF (MODERNA COVID-19 VACCINE, EUA, IM), PHARMACY ADMINISTERED, Disp: , Rfl:     dorzolamide-timolol 2-0.5% (COSOPT) 22.3-6.8 mg/mL ophthalmic solution, USE ONE DROP IN EACH EYE TWICE DAILY. (50 DAY SUPPLY PER 10ML), Disp: 30 mL, Rfl: 3    ezetimibe (ZETIA) 10 mg tablet, TAKE ONE TABLET BY MOUTH ONCE DAILY, Disp: 90 tablet, Rfl: 3    gabapentin (NEURONTIN) 300 MG capsule, TAKE ONE CAPSULE BY MOUTH THREE TIMES DAILY, Disp: 90 capsule, Rfl: 2    latanoprost 0.005 % ophthalmic solution, PLACE ONE DROP IN EACH EYE EVERY EVENING, Disp: 2.5 mL, Rfl: 12    methocarbamoL (ROBAXIN) 500 MG Tab, Take 1 tablet (500 mg total) by mouth 2 (two) times daily as needed (muscle spasms)., Disp: 30 tablet, Rfl: 0    MULTIVITAMIN W-MINERALS/LUTEIN (CENTRUM SILVER ORAL), Take by mouth., Disp: , Rfl:     vitamin D 1000 units Tab, Take 1,000 Units by mouth once daily., Disp: , Rfl:     Past Medical History:   Diagnosis Date    Arthritis     Cancer     skin cancer    Cataract     Done OU    Episode of hypertension 12/28/2012    pt states she does not have, Maybe white coat syndrome    GERD (gastroesophageal reflux disease)     Glaucoma     suspect    Hyperlipidemia     Ocular hypertension     Spinal stenosis        Past Surgical History:   Procedure Laterality Date    APPENDECTOMY      CATARACT EXTRACTION W/  INTRAOCULAR LENS IMPLANT Right 07/08/2015    Dr Engel    CATARACT EXTRACTION W/  INTRAOCULAR LENS IMPLANT Left 01/16/2020    Dr Francois    COLONOSCOPY      EPIDURAL STEROID INJECTION INTO LUMBAR SPINE N/A 11/11/2020    Procedure: Injection-steroid-epidural-lumbar L5/S1;  Surgeon: Cachorro Figueroa MD;  Location: [facility];  Service: Pain Management;  Laterality: N/A;    EPIDURAL STEROID INJECTION INTO LUMBAR SPINE N/A 12/20/2021    Procedure: Injection-steroid-epidural-lumbar L5/S1 to the left;   Surgeon: Cachorro Figueroa MD;  Location: [facility] OR;  Service: Pain Management;  Laterality: N/A;    LUMBAR EPIDURAL INJECTION      MOUTH SURGERY      dental implants    PHACOEMULSIFICATION OF CATARACT Left 2020    Procedure: PHACOEMULSIFICATION, CATARACT;  Surgeon: Francis Francois Jr., MD;  Location: [facility] OR;  Service: Ophthalmology;  Laterality: Left;  Left    Yag Capsulotomy Right 10/01/2021    Dr Francois       Family History   Problem Relation Age of Onset    Diabetes Mother     Heart attack Mother     Cancer Maternal Aunt         ovarian    Heart attack Father     Arthritis Father     Heart disease Father          at age 68    Heart attack Sister     Heart attack Brother     Amblyopia Neg Hx     Blindness Neg Hx     Cataracts Neg Hx     Hypertension Neg Hx     Macular degeneration Neg Hx     Retinal detachment Neg Hx     Strabismus Neg Hx     Stroke Neg Hx     Thyroid disease Neg Hx     Glaucoma Neg Hx        Social History     Socioeconomic History    Marital status:    Occupational History    Occupation: retired   Tobacco Use    Smoking status: Never Smoker    Smokeless tobacco: Never Used   Substance and Sexual Activity    Alcohol use: Yes     Alcohol/week: 2.0 standard drinks     Types: 2 Glasses of wine per week    Drug use: No    Sexual activity: Not Currently     Social Determinants of Health     Financial Resource Strain: Low Risk     Difficulty of Paying Living Expenses: Not very hard   Food Insecurity: No Food Insecurity    Worried About Running Out of Food in the Last Year: Never true    Ran Out of Food in the Last Year: Never true   Transportation Needs: No Transportation Needs    Lack of Transportation (Medical): No    Lack of Transportation (Non-Medical): No   Physical Activity: Inactive    Days of Exercise per Week: 3 days    Minutes of Exercise per Session: 0 min   Stress: No Stress Concern Present    Feeling of Stress : Not at all   Social  "Connections: Unknown    Frequency of Communication with Friends and Family: More than three times a week    Frequency of Social Gatherings with Friends and Family: More than three times a week    Active Member of Clubs or Organizations: Yes    Attends Club or Organization Meetings: More than 4 times per year    Marital Status:    Housing Stability: Low Risk     Unable to Pay for Housing in the Last Year: No    Number of Places Lived in the Last Year: 1    Unstable Housing in the Last Year: No       Review of patient's allergies indicates:   Allergen Reactions    Cymbalta [duloxetine] Nausea And Vomiting    Polysporin [bacitracin-polymyxin b] Rash    Statins-hmg-coa reductase inhibitors      "muscles freezing and could not use them"    Codeine Nausea And Vomiting    Gamma immune glob from whey Other (See Comments)     shakes    Lidocaine Itching    Penicillins Hives    Sulfa (sulfonamide antibiotics) Swelling    Bacitracin Rash       Review of Systems:  General ROS: negative for - fever  Psychological ROS: negative for - hostility  Hematological and Lymphatic ROS: negative for - bleeding problems  Endocrine ROS: negative for - unexpected weight changes  Respiratory ROS: no cough, shortness of breath, or wheezing  Cardiovascular ROS: no chest pain or dyspnea on exertion  Gastrointestinal ROS: no abdominal pain, change in bowel habits, or black or bloody stools  Musculoskeletal ROS: negative for - muscular weakness  Neurological ROS: negative for - bowel and bladder control changes or numbness/tingling  Dermatological ROS: negative for rash    Physical Exam:  Vitals:    02/24/22 0924   BP: (!) 175/75   Pulse: 88   SpO2: 96%   Weight: 70.6 kg (155 lb 10.3 oz)   Height: 5' 4" (1.626 m)   PainSc:   8   PainLoc: Back     Body mass index is 26.72 kg/m².     Gen: NAD, pleasant, well groomed  Gait: gait intact  Psych:  Mood appropriate for given condition  HEENT: eyes anicteric   GI: Abd soft  CV: " RRR  Lungs: breathing unlabored   ROM: limited AROM of the L spine in all planes, full ROM at ankles, knees and hips  Lumbar flexion 90 degrees, extension 50 degrees, side bending 30 degrees.    Sensation: intact to light touch in all dermatomes tested from L2-S1 bilaterally  Reflexes: 0/0 b/l patella and 0/0 b/l achilles  Palpation:  -TTP over the b/l greater trochanters and bilateral SI joint  Tone: normal in the b/l knees and hips   Skin: intact  Extremities: No edema in b/l ankles or hands  Provacative tests:        Right Left   L2/3 Iliacus Hip flexion  5  5   L3/4 Qudratus Femoris Knee Extension  5  5   L4/5 Tib Anterior Ankle Dorsiflexion   5  5   L5/S1 Extensor Hallicus Longus Great toe extension  5  5                 S1/S2 Gastroc/Soleus Plantar Flexion  5  5       Imaging:  MRI lumbar spine 5/17/19  FINDINGS:  The conus ends at T12-L1.  No diffuse abnormal marrow or central conus signal is appreciated. No paraspinal fluid collections are appreciated. Osseous alignment appears maintained. There is multilevel facet, ligamentous hypertrophy mid to lower lumbar predominantly similar as well as maintained alignment demonstrating multilevel disc space narrowing, desiccation and marginal anterolisthesis L4-5.  There is Modic endplate degeneration at L2-3 slightly increased overall.  No interval extruded  type fragment is appreciated.     L1-2 demonstrates some broad-based concentric disc bulging, thecal sac triangulation as well as mild lateral recess, inferior neural foraminal narrowing bilaterally.  L2-3 demonstrates broad-based concentric disc bulging with thecal sac triangulation as well as moderate neural foraminal narrowing bilaterally right slightly more so than left.  L3-4 demonstrates broad-based concentric disc bulging with thecal sac triangulation, narrowing overall.  There is some annular fissuring as well as moderate left, moderate to significant right neural foraminal narrowing with some  posterior element hypertrophy, spurring.  L4-5 demonstrates broad-based concentric disc bulging with annular fissuring.  There is moderate neural foraminal narrowing demonstrated bilaterally with spinal canal narrowing overall along with exuberant posterior element, ligamentous hypertrophy.  L5-S1 demonstrates some broad-based concentric disc bulging with annular fissuring and small to moderate central protrusion.  There is significant neural foraminal narrowing demonstrated on the left as well as moderate right.    MRI lumbar spine 12/5/21  FINDINGS:  NOMENCLATURE: Five lumbar type vertebral bodies.     CORD/CAUDA EQUINA: Conus has normal size and signal and ends at a normal level of L1-L2.     ALIGNMENT: Trace retrolisthesis of L1 on L2, L2 on L3 and L3 on L4.  4 mm grade 1 anterolisthesis of L4 on L5.  levoscoliosis with a Segundo angle of 18.1 degrees using the superior T12 and inferior L5 endplates (series 8, image 9).     BONES: Vertebral body heights are maintained.  Multilevel endplate changes, greatest at L2-3 and L5-S1 where there are type 1 endplate changes.  Prominent type 2 endplate changes on the right at L3-4.  Hemangioma in the left T12 pedicle and S1 vertebral body.  No aggressive bone marrow signal.     PARASPINAL AREA: Normal.     LUMBAR DISC LEVELS:     T12-L1: No disc herniation or significant posterior osteophytic ridging.  Ligamentum flavum thickening.  No significant spinal canal or foraminal stenosis.  L1-L2: Trace retrolisthesis.  Mild disc bulge.  Disc height loss.  Ligamentum flavum thickening.  Mild narrowing of the bilateral lateral recesses.  No significant spinal canal stenosis.  Mild bilateral foraminal stenosis.  L2-L3: Trace retrolisthesis.  Mild disc bulge.  Disc height loss.  Ligamentum flavum thickening.  Mild bilateral facet hypertrophy.  Mild right greater than left narrowing of the lateral recesses, minimally increased from prior study.  No significant spinal canal stenosis.   Mild-moderate right and mild left foraminal stenosis.  L3-L4: Trace retrolisthesis.  Mild disc bulge.  Disc height loss.  Bilateral facet hypertrophy.  Ligamentum flavum thickening.  Moderate right and mild-moderate left narrowing of the lateral recesses.  Mild spinal canal stenosis, minimally decreased from prior study.  Mild-moderate right and mild left foraminal stenosis.  This appears less pronounced on the right compared to prior study.  L4-L5: Anterolisthesis.  Unroofing of the disc and mild disc bulge.  Moderate narrowing of the bilateral lateral recesses.  Severe spinal canal stenosis, unchanged.  Mild bilateral foraminal stenosis, unchanged.  L5-S1: Mild disc bulge, eccentric to the left.  Moderate-marked left and mild right facet hypertrophy.  Ligamentum flavum thickening, greater on the left.  Moderate left and minimal right narrowing of the lateral recesses.  No significant spinal canal stenosis.  Moderate-severe left and minimal right foraminal stenosis.  This is progressed on the left compared to prior study.    Labs:  BMP  Lab Results   Component Value Date     02/16/2022    K 4.8 02/16/2022     02/16/2022    CO2 27 02/16/2022    BUN 16 02/16/2022    CREATININE 0.8 02/16/2022    CALCIUM 9.9 02/16/2022    ANIONGAP 10 02/16/2022    ESTGFRAFRICA >60.0 02/16/2022    EGFRNONAA >60.0 02/16/2022     Lab Results   Component Value Date    ALT 15 02/16/2022    AST 17 02/16/2022    ALKPHOS 66 02/16/2022    BILITOT 0.4 02/16/2022     Lab Results   Component Value Date    WBC 7.50 01/11/2022    HGB 13.0 01/11/2022    HCT 40.3 01/11/2022    MCV 93 01/11/2022     01/11/2022       Assessment:   Problem List Items Addressed This Visit        Neuro    Lumbar radiculopathy - Primary      Other Visit Diagnoses     Lumbar spondylosis            87 y.o. year old female presents to the office with back pain.  She's had chronic lower back pain for the past 8 years that has been gradually worsening.       2/24/2022: Libby Estrada returns to the clinic for follow up.  Today she continues report low back pain, 8/10, with radiation down both legs.  She states she has good days and bad days prior pain can sometimes be intolerable, most often at nighttime. She recently had an episode of transient confusion, which she believes may have been a result of the previous epidural steroid injection.  She continues to take gabapentin, Tylenol and baclofen with only mild relief.  She has seen Neurosurgery in the past and is not currently interested in surgery at this time.  She denies any new numbness, weakness or changes with her bowel or bladder function.    -on exam today she has full strength.  Reflexes: 0/0 b/l patella and 0/0 b/l achilles  - I independently reviewed her updated lumbar MRI and it is c/w L4-5 severe spinal canal stenosis, unchanged from 2019 and L5-S1 moderate left and minimal right narrowing of the lateral recesses, moderate-severe left and minimal right foraminal stenosis that has progressed on the left compared to prior study.  - she recently reports some transient confusion which she believes is a result of the previous LENY a few months ago.  At this time she is not interested in repeating an LENY.  - she has found some relief with gabapentin, Tylenol and Robaxin but still has occasional severe pain at night.  - today we discussed increasing her gabapentin from 300/300/300 to 300/300/600.  We discussed if she does not tolerate the increased to return to her previous regimen.  - we also discussed trial of tramadol 50 mg p.r.n. for breakthrough pain.   - she plans on returning to aquatic exercises at [facility].  She continues to use her walker and cane to ambulate throughout the property.   - short course of tramadol provided by Dr. Figueroa today.   - follow up in 1 month to see how she is tolerating the increased gabapentin and trial tramadol.      :  Reviewed    The total time spent for evaluation and  management on 02/24/2022 including reviewing separately obtained history, performing a medically appropriate exam and evaluation, documenting clinical information in the health record, independently interpreting results and communicating them to the patient/family/caregiver, and ordering medications/tests/procedures was between 30-39 minutes.

## 2022-02-25 ENCOUNTER — PATIENT MESSAGE (OUTPATIENT)
Dept: PAIN MEDICINE | Facility: CLINIC | Age: 87
End: 2022-02-25
Payer: MEDICARE

## 2022-03-08 ENCOUNTER — PATIENT MESSAGE (OUTPATIENT)
Dept: PAIN MEDICINE | Facility: CLINIC | Age: 87
End: 2022-03-08
Payer: MEDICARE

## 2022-03-10 NOTE — TELEPHONE ENCOUNTER
----- Message from Frida Stewart MA sent at 3/10/2022 11:06 AM CST -----  Type: Needs Medical Advice  Who Called:  pt    Pharmacy name and phone #:    [facility] - [facility] [address], [address]  [address], [address]  [facility] [address]  Phone: [phone] Fax: [phone]    Best Call Back Number: [phone]  Additional Information: pt spoke with Anthony Alexander PA-C for Pain management, had an reaction to Tramadol & he suggested to double up on gabapentin (NEURONTIN) 300 MG capsule--pt is taking 600mg at night only--would like to request another Rx so she doesn't run out--is there anything else for pain that we can recommend? She does have an appt with a neurologist, Dr Robb to see if she is a candidate for spinal cord stimulator--Please advise--thank you

## 2022-03-10 NOTE — TELEPHONE ENCOUNTER
No new care gaps identified.  Powered by [employer]. Reference number: [id number].   3/10/2022 11:44:02 AM CST

## 2022-03-10 NOTE — TELEPHONE ENCOUNTER
Pt stated that she had a reaction to the Tramadol and pain management suggested she take a double dose of the gabapentin. She needs a new script to reflect the change. Pt is now taking 600 mgs at night for a total of 1200mgs daily instead of 900mgs.    Pt was told that the tramadol and codeine were similar medications and she is allergic to the codeine. Please advise medication pend.

## 2022-03-14 RX ORDER — GABAPENTIN 300 MG/1
300 CAPSULE ORAL 3 TIMES DAILY
Qty: 90 CAPSULE | Refills: 2 | Status: SHIPPED | OUTPATIENT
Start: 2022-03-14 | End: 2022-07-01

## 2022-03-16 ENCOUNTER — PATIENT MESSAGE (OUTPATIENT)
Dept: PAIN MEDICINE | Facility: CLINIC | Age: 87
End: 2022-03-16
Payer: MEDICARE

## 2022-03-24 ENCOUNTER — OFFICE VISIT (OUTPATIENT)
Dept: PAIN MEDICINE | Facility: CLINIC | Age: 87
End: 2022-03-24
Payer: MEDICARE

## 2022-03-24 VITALS
DIASTOLIC BLOOD PRESSURE: 88 MMHG | WEIGHT: 155.63 LBS | BODY MASS INDEX: 25.93 KG/M2 | HEART RATE: 75 BPM | HEIGHT: 65 IN | SYSTOLIC BLOOD PRESSURE: 189 MMHG

## 2022-03-24 DIAGNOSIS — G89.4 CHRONIC PAIN SYNDROME: ICD-10-CM

## 2022-03-24 DIAGNOSIS — M54.16 LUMBAR RADICULOPATHY: Primary | ICD-10-CM

## 2022-03-24 PROCEDURE — 99214 OFFICE O/P EST MOD 30 MIN: CPT | Mod: S$PBB,,,

## 2022-03-24 PROCEDURE — 99214 OFFICE O/P EST MOD 30 MIN: CPT | Mod: PBBFAC,PN

## 2022-03-24 PROCEDURE — 99999 PR PBB SHADOW E&M-EST. PATIENT-LVL IV: CPT | Mod: PBBFAC,,,

## 2022-03-24 PROCEDURE — 99214 PR OFFICE/OUTPT VISIT, EST, LEVL IV, 30-39 MIN: ICD-10-PCS | Mod: S$PBB,,,

## 2022-03-24 PROCEDURE — 99999 PR PBB SHADOW E&M-EST. PATIENT-LVL IV: ICD-10-PCS | Mod: PBBFAC,,,

## 2022-03-24 RX ORDER — AMITRIPTYLINE HYDROCHLORIDE 25 MG/1
25 TABLET, FILM COATED ORAL NIGHTLY PRN
Qty: 30 TABLET | Refills: 1 | Status: SHIPPED | OUTPATIENT
Start: 2022-03-24 | End: 2022-03-24 | Stop reason: CLARIF

## 2022-03-24 NOTE — PROGRESS NOTES
[facility] Follow Up Evaluation    Referred by: Cristine Hidalgo PA-C  Reason for referral: back pain    CC:   Chief Complaint   Patient presents with    Back Pain      Last 3 PDI Scores 3/24/2022 11/23/2021 1/12/2021   Pain Disability Index (PDI) 70 0 11       Interval HPI 3/24/2022: Libby Estrada returns to the clinic for follow up.  Last visit we started a trial of tramadol for severe pain but she did not tolerate the tramadol due to feelings of nausea.  Today she continues report severe 10/10 pain with radiation into bilateral legs.  She continues to take gabapentin and Tylenol with mild relief.  She reports the pain is worse with walking, as she has to walk a great distance to go to her dining sims.  She continues to ambulate with a Rollator due to the pain. She has seen Neurosurgery in the past and is not currently interested in surgery at this time due to how extensive of a surgery it would be.  She denies any new numbness, weakness or changes with her bowel or bladder function.      Pain Intervention History:    - s/p L5/S1 LENY on 11/11/20 with about 50% relief of her low back pain.  - s/p L5/S1 LENY on 12/20/21 with 50% relief.    HPI:   Libby Estrada is a 87 y.o. female who complains of back pain    Onset: about 8 years  Inciting Event: none  Progression: since onset, pain is gradually worsening   Current Pain Score: 10/10  Typical Range: 6-10/10  Timing: constant  Quality: burning, sharp, stabbing   Radiation: yes, down the back of both legs L>R  Associated numbness or weakness: no numbness, no weakness   Exacerbated by: standing, walking, bending  Allievated by: rest, sitting, medications, laying down.   Is Pain Level Acceptable?: No    Previous Therapies:  PT/OT: yes, in the past  HEP:   Interventions:   Surgery:  Medications:   - NSAIDS:   - MSK Relaxants: baclofen, Robaxin  - TCAs:   - SNRIs:  Cymbalta- did not tolerate  - Topicals:   - Anticonvulsants: gabapentin  - Opioids:  Tramadol- did  not tolerate    History:    Current Outpatient Medications:     acetaminophen (TYLENOL ARTHRITIS PAIN ORAL), Take by mouth., Disp: , Rfl:     baclofen (LIORESAL) 10 MG tablet, Take 1 tablet (10 mg total) by mouth 3 (three) times daily., Disp: 60 tablet, Rfl: 01    COVID-19 vacc,mRNA,Moderna,/PF (MODERNA COVID-19 VACCINE, EUA, IM), PHARMACY ADMINISTERED, Disp: , Rfl:     dorzolamide-timolol 2-0.5% (COSOPT) 22.3-6.8 mg/mL ophthalmic solution, USE ONE DROP IN EACH EYE TWICE DAILY. (50 DAY SUPPLY PER 10ML), Disp: 30 mL, Rfl: 3    ezetimibe (ZETIA) 10 mg tablet, TAKE ONE TABLET BY MOUTH ONCE DAILY, Disp: 90 tablet, Rfl: 3    gabapentin (NEURONTIN) 300 MG capsule, Take 1 capsule (300 mg total) by mouth 3 (three) times daily., Disp: 90 capsule, Rfl: 2    latanoprost 0.005 % ophthalmic solution, PLACE ONE DROP IN EACH EYE EVERY EVENING, Disp: 2.5 mL, Rfl: 12    methocarbamoL (ROBAXIN) 500 MG Tab, Take 1 tablet (500 mg total) by mouth 2 (two) times daily as needed (muscle spasms)., Disp: 30 tablet, Rfl: 0    MULTIVITAMIN W-MINERALS/LUTEIN (CENTRUM SILVER ORAL), Take by mouth., Disp: , Rfl:     vitamin D 1000 units Tab, Take 1,000 Units by mouth once daily., Disp: , Rfl:     Past Medical History:   Diagnosis Date    Arthritis     Cancer     skin cancer    Cataract     Done OU    Episode of hypertension 12/28/2012    pt states she does not have, Maybe white coat syndrome    GERD (gastroesophageal reflux disease)     Glaucoma     suspect    Hyperlipidemia     Ocular hypertension     Spinal stenosis        Past Surgical History:   Procedure Laterality Date    APPENDECTOMY      CATARACT EXTRACTION W/  INTRAOCULAR LENS IMPLANT Right 07/08/2015    Dr Engel    CATARACT EXTRACTION W/  INTRAOCULAR LENS IMPLANT Left 01/16/2020    Dr Francois    COLONOSCOPY      EPIDURAL STEROID INJECTION INTO LUMBAR SPINE N/A 11/11/2020    Procedure: Injection-steroid-epidural-lumbar L5/S1;  Surgeon: Cachorro Figueroa MD;   Location: [facility] OR;  Service: Pain Management;  Laterality: N/A;    EPIDURAL STEROID INJECTION INTO LUMBAR SPINE N/A 2021    Procedure: Injection-steroid-epidural-lumbar L5/S1 to the left;  Surgeon: Cachorro Figueroa MD;  Location: [facility] OR;  Service: Pain Management;  Laterality: N/A;    LUMBAR EPIDURAL INJECTION      MOUTH SURGERY      dental implants    PHACOEMULSIFICATION OF CATARACT Left 2020    Procedure: PHACOEMULSIFICATION, CATARACT;  Surgeon: Francis Francois Jr., MD;  Location: [facility] OR;  Service: Ophthalmology;  Laterality: Left;  Left    Yag Capsulotomy Right 10/01/2021    Dr Francois       Family History   Problem Relation Age of Onset    Diabetes Mother     Heart attack Mother     Cancer Maternal Aunt         ovarian    Heart attack Father     Arthritis Father     Heart disease Father          at age 68    Heart attack Sister     Heart attack Brother     Amblyopia Neg Hx     Blindness Neg Hx     Cataracts Neg Hx     Hypertension Neg Hx     Macular degeneration Neg Hx     Retinal detachment Neg Hx     Strabismus Neg Hx     Stroke Neg Hx     Thyroid disease Neg Hx     Glaucoma Neg Hx        Social History     Socioeconomic History    Marital status:    Occupational History    Occupation: retired   Tobacco Use    Smoking status: Never Smoker    Smokeless tobacco: Never Used   Substance and Sexual Activity    Alcohol use: Yes     Alcohol/week: 2.0 standard drinks     Types: 2 Glasses of wine per week    Drug use: No    Sexual activity: Not Currently     Social Determinants of Health     Financial Resource Strain: Low Risk     Difficulty of Paying Living Expenses: Not very hard   Food Insecurity: No Food Insecurity    Worried About Running Out of Food in the Last Year: Never true    Ran Out of Food in the Last Year: Never true   Transportation Needs: No Transportation Needs    Lack of Transportation (Medical): No    Lack of Transportation (Non-Medical): No  "  Physical Activity: Inactive    Days of Exercise per Week: 3 days    Minutes of Exercise per Session: 0 min   Stress: No Stress Concern Present    Feeling of Stress : Not at all   Social Connections: Unknown    Frequency of Communication with Friends and Family: More than three times a week    Frequency of Social Gatherings with Friends and Family: More than three times a week    Active Member of Clubs or Organizations: Yes    Attends Club or Organization Meetings: More than 4 times per year    Marital Status:    Housing Stability: Low Risk     Unable to Pay for Housing in the Last Year: No    Number of Places Lived in the Last Year: 1    Unstable Housing in the Last Year: No       Review of patient's allergies indicates:   Allergen Reactions    Cymbalta [duloxetine] Nausea And Vomiting    Polysporin [bacitracin-polymyxin b] Rash    Statins-hmg-coa reductase inhibitors      "muscles freezing and could not use them"    Codeine Nausea And Vomiting    Gamma immune glob from whey Other (See Comments)     shakes    Lidocaine Itching    Penicillins Hives    Sulfa (sulfonamide antibiotics) Swelling    Bacitracin Rash       Review of Systems:  General ROS: negative for - fever  Psychological ROS: negative for - hostility  Hematological and Lymphatic ROS: negative for - bleeding problems  Endocrine ROS: negative for - unexpected weight changes  Respiratory ROS: no cough, shortness of breath, or wheezing  Cardiovascular ROS: no chest pain or dyspnea on exertion  Gastrointestinal ROS: no abdominal pain, change in bowel habits, or black or bloody stools  Musculoskeletal ROS: negative for - muscular weakness  Neurological ROS: negative for - bowel and bladder control changes or numbness/tingling  Dermatological ROS: negative for rash    Physical Exam:  Vitals:    03/24/22 0935 03/24/22 1013   BP: (!) 198/93 (!) 189/88   Pulse: 75 75   Weight: 70.6 kg (155 lb 10.3 oz)    Height: 5' 4.8" (1.646 m)  "   PainSc: 10-Worst pain ever    PainLoc: Back      Body mass index is 26.06 kg/m².     Gen: NAD, pleasant, well groomed  Gait: gait intact  Psych:  Mood appropriate for given condition  HEENT: eyes anicteric   GI: Abd soft  CV: RRR  Lungs: breathing unlabored   ROM: limited AROM of the L spine in all planes, full ROM at ankles, knees and hips  Lumbar flexion 90 degrees, extension 50 degrees, side bending 30 degrees.    Sensation: intact to light touch in all dermatomes tested from L2-S1 bilaterally  Reflexes: 0/0 b/l patella and 0/0 b/l achilles  Palpation:  -TTP over the b/l greater trochanters and bilateral SI joint  Tone: normal in the b/l knees and hips   Skin: intact  Extremities: No edema in b/l ankles or hands  Provacative tests:        Right Left   L2/3 Iliacus Hip flexion  5  5   L3/4 Qudratus Femoris Knee Extension  5  5   L4/5 Tib Anterior Ankle Dorsiflexion   5  5   L5/S1 Extensor Hallicus Longus Great toe extension  5  5                 S1/S2 Gastroc/Soleus Plantar Flexion  5  5       Imaging:  MRI lumbar spine 5/17/19  FINDINGS:  The conus ends at T12-L1.  No diffuse abnormal marrow or central conus signal is appreciated. No paraspinal fluid collections are appreciated. Osseous alignment appears maintained. There is multilevel facet, ligamentous hypertrophy mid to lower lumbar predominantly similar as well as maintained alignment demonstrating multilevel disc space narrowing, desiccation and marginal anterolisthesis L4-5.  There is Modic endplate degeneration at L2-3 slightly increased overall.  No interval extruded  type fragment is appreciated.     L1-2 demonstrates some broad-based concentric disc bulging, thecal sac triangulation as well as mild lateral recess, inferior neural foraminal narrowing bilaterally.  L2-3 demonstrates broad-based concentric disc bulging with thecal sac triangulation as well as moderate neural foraminal narrowing bilaterally right slightly more so than left.  L3-4  demonstrates broad-based concentric disc bulging with thecal sac triangulation, narrowing overall.  There is some annular fissuring as well as moderate left, moderate to significant right neural foraminal narrowing with some posterior element hypertrophy, spurring.  L4-5 demonstrates broad-based concentric disc bulging with annular fissuring.  There is moderate neural foraminal narrowing demonstrated bilaterally with spinal canal narrowing overall along with exuberant posterior element, ligamentous hypertrophy.  L5-S1 demonstrates some broad-based concentric disc bulging with annular fissuring and small to moderate central protrusion.  There is significant neural foraminal narrowing demonstrated on the left as well as moderate right.    MRI lumbar spine 12/5/21  FINDINGS:  NOMENCLATURE: Five lumbar type vertebral bodies.     CORD/CAUDA EQUINA: Conus has normal size and signal and ends at a normal level of L1-L2.     ALIGNMENT: Trace retrolisthesis of L1 on L2, L2 on L3 and L3 on L4.  4 mm grade 1 anterolisthesis of L4 on L5.  levoscoliosis with a Segundo angle of 18.1 degrees using the superior T12 and inferior L5 endplates (series 8, image 9).     BONES: Vertebral body heights are maintained.  Multilevel endplate changes, greatest at L2-3 and L5-S1 where there are type 1 endplate changes.  Prominent type 2 endplate changes on the right at L3-4.  Hemangioma in the left T12 pedicle and S1 vertebral body.  No aggressive bone marrow signal.     PARASPINAL AREA: Normal.     LUMBAR DISC LEVELS:     T12-L1: No disc herniation or significant posterior osteophytic ridging.  Ligamentum flavum thickening.  No significant spinal canal or foraminal stenosis.  L1-L2: Trace retrolisthesis.  Mild disc bulge.  Disc height loss.  Ligamentum flavum thickening.  Mild narrowing of the bilateral lateral recesses.  No significant spinal canal stenosis.  Mild bilateral foraminal stenosis.  L2-L3: Trace retrolisthesis.  Mild disc bulge.  Disc  height loss.  Ligamentum flavum thickening.  Mild bilateral facet hypertrophy.  Mild right greater than left narrowing of the lateral recesses, minimally increased from prior study.  No significant spinal canal stenosis.  Mild-moderate right and mild left foraminal stenosis.  L3-L4: Trace retrolisthesis.  Mild disc bulge.  Disc height loss.  Bilateral facet hypertrophy.  Ligamentum flavum thickening.  Moderate right and mild-moderate left narrowing of the lateral recesses.  Mild spinal canal stenosis, minimally decreased from prior study.  Mild-moderate right and mild left foraminal stenosis.  This appears less pronounced on the right compared to prior study.  L4-L5: Anterolisthesis.  Unroofing of the disc and mild disc bulge.  Moderate narrowing of the bilateral lateral recesses.  Severe spinal canal stenosis, unchanged.  Mild bilateral foraminal stenosis, unchanged.  L5-S1: Mild disc bulge, eccentric to the left.  Moderate-marked left and mild right facet hypertrophy.  Ligamentum flavum thickening, greater on the left.  Moderate left and minimal right narrowing of the lateral recesses.  No significant spinal canal stenosis.  Moderate-severe left and minimal right foraminal stenosis.  This is progressed on the left compared to prior study.    Labs:  BMP  Lab Results   Component Value Date     02/16/2022    K 4.8 02/16/2022     02/16/2022    CO2 27 02/16/2022    BUN 16 02/16/2022    CREATININE 0.8 02/16/2022    CALCIUM 9.9 02/16/2022    ANIONGAP 10 02/16/2022    ESTGFRAFRICA >60.0 02/16/2022    EGFRNONAA >60.0 02/16/2022     Lab Results   Component Value Date    ALT 15 02/16/2022    AST 17 02/16/2022    ALKPHOS 66 02/16/2022    BILITOT 0.4 02/16/2022     Lab Results   Component Value Date    WBC 7.50 01/11/2022    HGB 13.0 01/11/2022    HCT 40.3 01/11/2022    MCV 93 01/11/2022     01/11/2022       Assessment:   Problem List Items Addressed This Visit        Neuro    Lumbar radiculopathy - Primary     Relevant Orders    Ambulatory referral/consult to Psychiatry      Other Visit Diagnoses     Chronic pain syndrome        Relevant Orders    Ambulatory referral/consult to Psychiatry        87 y.o. year old female presents to the office with back pain.  She's had chronic lower back pain for the past 8 years that has been gradually worsening.      3/24/2022: Libby Estrada returns to the clinic for follow up.  Last visit we started a trial of tramadol for severe pain but she did not tolerate the tramadol due to feelings of nausea.  Today she continues report severe 10/10 pain with radiation into bilateral legs.  She continues to take gabapentin and Tylenol with mild relief.  She reports the pain is worse with walking, as she has to walk a great distance to go to her dining sims.  She continues to ambulate with a Rollator due to the pain. She has seen Neurosurgery in the past and is not currently interested in surgery at this time due to how extensive of a surgery it would be.  She denies any new numbness, weakness or changes with her bowel or bladder function.    -on exam today she has full strength.  Reflexes: 0/0 b/l patella and 0/0 b/l achilles  - I independently reviewed her updated lumbar MRI and it is c/w L4-5 severe spinal canal stenosis, unchanged from 2019 and L5-S1 moderate left and minimal right narrowing of the lateral recesses, moderate-severe left and minimal right foraminal stenosis that has progressed on the left compared to prior study.  - she continues to have severe pain and does not tolerate tramadol or any pain medication with codeine  - she is only finding mild relief with Tylenol,  Ibuprofen and gabapentin  - the pain continues to limit her mobility interfere with her ADLs  - her last LENY was in December and a providedwith roughly 50% relief.  However after the LENY she had episode of confusion which she is unsure if it is related to the LENY are not but as result of it she is not interested a  repeat injection at this time.   - at this time with no relief from conservative measures with medications and fear of repeat injection we will begin the process for a spinal cord stimulator trial.  - ambulatory referral to Psychiatry was placed today  - based on results of psychiatry evaluation, we will schedule her for SCS trial with [employer]  - information card for [facility] provided to her today in clinic      :  Reviewed    The total time spent for evaluation and management on 03/24/2022 including reviewing separately obtained history, performing a medically appropriate exam and evaluation, documenting clinical information in the health record, independently interpreting results and communicating them to the patient/family/caregiver, and ordering medications/tests/procedures was between 30-39 minutes.

## 2022-03-25 ENCOUNTER — PATIENT MESSAGE (OUTPATIENT)
Dept: FAMILY MEDICINE | Facility: CLINIC | Age: 87
End: 2022-03-25
Payer: MEDICARE

## 2022-03-25 NOTE — TELEPHONE ENCOUNTER
Called and spoke with patient she stated that Dr. Alexander increased her gabapentin to 2 in the morning 1 at noon and two at bedtime and she need a new prescription please adivse

## 2022-03-28 ENCOUNTER — PATIENT MESSAGE (OUTPATIENT)
Dept: PAIN MEDICINE | Facility: CLINIC | Age: 87
End: 2022-03-28
Payer: MEDICARE

## 2022-03-29 ENCOUNTER — CLINICAL SUPPORT (OUTPATIENT)
Dept: PSYCHIATRY | Facility: CLINIC | Age: 87
End: 2022-03-29
Payer: MEDICARE

## 2022-03-29 DIAGNOSIS — M54.16 LUMBAR RADICULOPATHY: ICD-10-CM

## 2022-03-29 DIAGNOSIS — G89.4 CHRONIC PAIN SYNDROME: ICD-10-CM

## 2022-03-29 PROCEDURE — 99211 OFF/OP EST MAY X REQ PHY/QHP: CPT | Mod: PBBFAC,PO

## 2022-03-29 PROCEDURE — 99999 PR PBB SHADOW E&M-EST. PATIENT-LVL I: CPT | Mod: PBBFAC,,,

## 2022-03-29 PROCEDURE — 99999 PR PBB SHADOW E&M-EST. PATIENT-LVL I: ICD-10-PCS | Mod: PBBFAC,,,

## 2022-03-30 ENCOUNTER — TELEPHONE (OUTPATIENT)
Dept: FAMILY MEDICINE | Facility: CLINIC | Age: 87
End: 2022-03-30
Payer: MEDICARE

## 2022-03-30 NOTE — TELEPHONE ENCOUNTER
----- Message from Rachelle Bashir sent at 3/30/2022  2:16 PM CDT -----  Contact: Ye  Type:  Pharmacy Calling to Clarify an RX    Name of Caller: Ye     Pharmacy Name: [facility]    Prescription Name: gabapentin (NEURONTIN) 300 MG capsule      What do they need to clarify?: directions, Patient states she was told by Dr to take more and the pharmacy needs that in witting  to refill more   Patient is running out       Best Call Back Number: [phone]    Additional Information:

## 2022-03-30 NOTE — TELEPHONE ENCOUNTER
Pharmacy was told that medication was changed from 300 mgs nightly to 300mg BID per the conversation between Dr Salas and MAG Garner in pain mangement

## 2022-04-07 ENCOUNTER — PATIENT MESSAGE (OUTPATIENT)
Dept: PSYCHIATRY | Facility: CLINIC | Age: 87
End: 2022-04-07
Payer: MEDICARE

## 2022-04-08 ENCOUNTER — OFFICE VISIT (OUTPATIENT)
Dept: PSYCHIATRY | Facility: CLINIC | Age: 87
End: 2022-04-08
Payer: MEDICARE

## 2022-04-08 DIAGNOSIS — M54.16 LUMBAR RADICULOPATHY: Primary | ICD-10-CM

## 2022-04-08 DIAGNOSIS — M51.37 DEGENERATION OF INTERVERTEBRAL DISC OF LUMBOSACRAL REGION: ICD-10-CM

## 2022-04-08 DIAGNOSIS — G89.4 CHRONIC PAIN SYNDROME: ICD-10-CM

## 2022-04-08 PROCEDURE — 96138 PR PSYCH/NEUROPSYCH TEST ADMIN/SCORING, BY TECH, 2+ TESTS, 1ST 30 MIN: ICD-10-PCS | Mod: 95,,, | Performed by: PSYCHOLOGIST

## 2022-04-08 PROCEDURE — 90791 PSYCH DIAGNOSTIC EVALUATION: CPT | Mod: 95,,, | Performed by: PSYCHOLOGIST

## 2022-04-08 PROCEDURE — 96139 PR PSYCH/NEUROPSYCH TEST ADMIN/SCORING, BY TECH, 2+ TESTS, EA ADDTL 30 MIN: ICD-10-PCS | Mod: 95,,, | Performed by: PSYCHOLOGIST

## 2022-04-08 PROCEDURE — 90791 PR PSYCHIATRIC DIAGNOSTIC EVALUATION: ICD-10-PCS | Mod: 95,,, | Performed by: PSYCHOLOGIST

## 2022-04-08 PROCEDURE — 96131 PSYCL TST EVAL PHYS/QHP EA: CPT | Mod: 95,,, | Performed by: PSYCHOLOGIST

## 2022-04-08 PROCEDURE — 96130 PSYCL TST EVAL PHYS/QHP 1ST: CPT | Mod: 95,,, | Performed by: PSYCHOLOGIST

## 2022-04-08 PROCEDURE — 96131 PR PSYCHOLOGIC TEST EVAL SVCS, EA ADDTL HR: ICD-10-PCS | Mod: 95,,, | Performed by: PSYCHOLOGIST

## 2022-04-08 PROCEDURE — 96130 PR PSYCHOLOGIC TEST EVAL SVCS, 1ST HR: ICD-10-PCS | Mod: 95,,, | Performed by: PSYCHOLOGIST

## 2022-04-08 PROCEDURE — 96138 PSYCL/NRPSYC TECH 1ST: CPT | Mod: 95,,, | Performed by: PSYCHOLOGIST

## 2022-04-08 PROCEDURE — 96139 PSYCL/NRPSYC TST TECH EA: CPT | Mod: 95,,, | Performed by: PSYCHOLOGIST

## 2022-04-17 ENCOUNTER — DOCUMENTATION ONLY (OUTPATIENT)
Dept: PSYCHIATRY | Facility: CLINIC | Age: 87
End: 2022-04-17
Payer: MEDICARE

## 2022-04-18 NOTE — PROGRESS NOTES
The patient location is: [address] [place] [address]. [address]   [place], [place]  The patient location [place] is: [place]  The patient phone number is: [phone]  Visit type: Virtual visit with synchronous audio and video  Each patient to whom he or she provides medical services by telemedicine is:  (1) informed of the relationship between the physician and patient and the respective role of any other health care provider with respect to management of the patient; and (2) notified that he or she may decline to receive medical services by telemedicine and may withdraw from such care at any time.      Psychiatry Initial Visit (PhD)    NAME:  Libby Estrada  MRN: [id number]  : 1935    Date:   2022  Site: [facility], [place]   CPT Code: 85128  Clinical status of patient:  Outpatient  Met with:  Patient  Referred by: Anthony Alexander PA-C    Chief complaint/reason for encounter:  Psychological Evaluation prior to surgical implantation of a spinal cord stimulator (SCS) to address chronic pain.      Before this evaluation was initiated, the purposes and process of the assessment and the limits of confidentiality were discussed with the patient who expressed understanding of these issues and verbally consented to proceed with the evaluation.    History of present illness:  Ms. Estrada is an 87-year-old cisgender female referred for Psychological Evaluation prior to surgical implantation of a spinal cord stimulator (SCS) to address chronic pain.  She has chronic pain in her back and sciatic pain in her legs. Her pain has been present since about . Pt stated pain has worsened lately and has experienced severe pain since 2022. Pt states she has about 6 things wrong with her back, including deterioration of her spine. She has tried physical therapy, medications, and injections without receiving sufficient relief.  Her activities are greatly limited.  She has trouble walking and difficult sitting up. Ms. Connors  states she uses a walker to assist her walking.     Ms. Estrada is now interested in a trial of SCS.  She has reviewed the educational materials and is familiar with the procedures involved.  She understood risks of surgery including bleeding, infection, failure of surgery, misplaced hardware, migration of hardware, need for reoperation, etc. When asked about potential concerns regarding SCS, she denied significant concerns. Her expectations regarding SCS include increased mobility and getting out to engage in recreational activities more often. Pt also reported hope that she will not have to use a wheelchair in the near future if SCS is effective. If SCS is ineffective for her, she noted she would likely have to use a wheelchair and enter assisted living in her current living community, [facility].  Her daughter will be available to help her during recovery after surgery.    Ms. Estrada denied past psychiatric treatment and history.  She does not report current psychiatric problems or major psychosocial stressors.  She was pleasant and cooperative in interview and appeared to be in good spirits.    Medical history:    Past Medical History:   Diagnosis Date    Arthritis     Cancer     skin cancer    Cataract     Done OU    Episode of hypertension 2012    pt states she does not have, Maybe white coat syndrome    GERD (gastroesophageal reflux disease)     Glaucoma     suspect    Hyperlipidemia     Ocular hypertension     Spinal stenosis          Pain scales:   Current level of pain: 5-6/10  Worst pain rating: 10/10  Best pain ratin/10    Psychiatric Symptoms:  Depression:  Denied. She denied episodes of depressed mood or depression-related anhedonia, lack of motivation, lethargy, difficulty concentrating, feelings of worthlessness or guilt, hopelessness, appetite changes, or psychomotor changes.  Based on her reports, her symptoms do not meet full criteria for Major Depressive  Disorder.  Alisia/Hypomania:  Denied.  She denied periods of elevated mood or abnormally increased energy or goal-directed activity.  Anxiety:  Denied.  She denied experiencing excessive, exaggerated anxiety that was unmanageable.  Based on her reports, her symptoms do not meet full criteria for Generalized Anxiety Disorder.  Thoughts:  Denied delusions, hallucinations.  Suicidal thoughts/behaviors:  Pt denied.  Self-injury:  Denied.  Sleep: About 8 hours of sleep. Pt stated her sleep is interrupted several times a night due to pain.  Cognitive functioning:  Denied problems. Pt reported one recent incident of dissociative fugue. She stated she attempted to enter the wrong apartment at the time, but this was resolved after changing medication. Pts prescribing physician stated that this episode was caused by too high of a medication dosage. Pt denied significant experiences of dementia or dissociation since this episode.    Current psychosocial stressors:  Pt denied.  Report of coping skills: Play Easy Listening music on [employer]. Pt also plays Scrabble on her tablet.  Support system:  Pt has family and friend support.  Strengths and liabilities:  Strengths: Good at meeting people and making friends.  Liabilities: More considerate to people.    Current and past substance use: Pt denied.  Alcohol: 1 glass of wine 1-2 times a month; Denied history of abuse or dependency.   Drugs: Denied current use; denied history of abuse or dependency.  Tobacco: None. Never used tobacco.   Caffeine: 2 cups of coffee in the morning.    Current Psychiatric Treatment:  Medications:  Denied.  Psychotherapy: Denied.    Psychiatric History:  Previous diagnosis: Pt denied.  Previous hospitalizations: Pt denied.   History of outpatient treatment: Pt denied.  Previous suicide attempt:  Denied.  Family history of psychiatric illness:  Granddaughter  by suicide.  Access to guns:  Pt denied.     Trauma history:  Pt denied.    Social  history (marriage, employment, etc.):   Ms. Estrada was [place] and [place] by her  parents along with 8 siblings. Pt was oldest of 9 children. Pt stated that all siblings except her oldest brother is living. She described her childhood as happy. Mother was a stay-at-home mom and taught her how to cook and sew. Father would take her to the park regularly.  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 She denied childhood trauma, abuse, and neglect.  She stated school was good, and she was an average student. She denied being enrolled in special education or being held back.  She denied detentions, suspensions, and expulsions.  Pt took some college courses at [employer] as well. 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 Pt lived and worked in [place], [place] for about 15 years. Pt is currently retired. She denied  service.   retired from [employer] at age 57.  She has been  twice. Pt was  to first  for 49 years. Pt was  again in , and   in . Pt stated finances are stable.  She had 4 children, and three are living. One daughter  at age 26 in a car accident. One daughter is living in [place].  She currently lives alone in her apartment in [facility]  Hoahaoism is important to her and she identifies as Nondenominational. Pt attends [employer] at present.    Legal history:  She denied history of arrests and convictions.  She denied current involvement in litigation.    Mental Status Exam:  General appearance:  appears stated age, neatly dressed, well groomed  Speech:  normal rate and tone  Level of cooperation:  cooperative  Thought processes:  logical, goal-directed  Mood:  euthymic  Thought content:  no illusions, no visual hallucinations, no auditory hallucinations, no delusions, no active or passive homicidal thoughts, no active or passive suicidal ideation, no obsessions, no compulsions, no  violence  Affect:  appropriate  Orientation:  oriented to person, place, and date  Memory:  Recent memory:  3 of 3 objects after brief delay.    Remote memory - intact  Attention span and concentration:  spelled HOUSE forward and backwards  Fund of general knowledge: 3 of 3 recent presidents  Abstract reasoning:    Similarities: abstract.    Proverbs: abstract.  Judgment and insight: fair  Language:  intact    Diagnostic impressions:  Ms. Estrada does not meet criteria for any DSM-5 psychiatric disorder.    Plan and Recommendations:  Ms. Estrada completed psychological testing.  The testing report of this psychological evaluation will follow in the Notes folder in the patients chart in the encounter titled Psychological Testing.    Ms. Estrada has no significant psychiatric history and reports no current psychiatric problems or adjustment issues.  There are no recommendations for psychological treatment at this time, and she is aware of resources available should her needs change in the future.  This evaluation revealed NO contraindications to the spinal cord stimulator (SCS) from a psychological perspective.      Length of time:  65 minutes

## 2022-04-18 NOTE — PROGRESS NOTES
This evaluation revealed NO contraindications to SCS from a psychological perspective.      Ms. Holt testing profile was largely consistent with her reports in the clinical interview.  In the clinical interview, Ms. Estrada denied a history of psychiatric problems or adjustment issues.  There are no recommendations for psychological intervention at this time.  This evaluation revealed NO contraindications to SCS from a psychological perspective.

## 2022-04-18 NOTE — PSYCH TESTING
[facility] [address] [place]  [address]  [phone]    REPORT OF PSYCHOLOGICAL TESTING    NAME: Libby Estrada  MRN: [id number]  : 1935    REFERRED BY: Anthony Alexander PA-C    REASON FOR REFERRAL:  Psychological Evaluation prior to surgical implantation of a spinal cord stimulator (SCS) to address chronic pain    EVALUATED BY:  Leif Ferraro, Ph.D., Licensed Psychologist  DANIELLE Wade, Psychometrician     DATES OF EVALUATION: 2022; 2022    EVALUATION PROCEDURES AND TIMES:  Conducted by Psychologist:  Integration of patient data, interpretation of standardized test results and clinical data, clinical decision-making, treatment planning and report, and interactive feedback to the patient  CPT Codes:  01007 - 1 hour; 70824 - 1 hour  Conducted by Technician:  Psychological test administration and scoring by technician, two or more tests, any method:  [place] Multiphasic Personality Inventory - 3 (MMPI-3); Pain and Impairment Relationship Scale (PAIRS); [place] Pain Catastrophizing Scale (PCS)  CPT Codes:  10850 - 30 minutes; 76346 - 30 minutes, 38484 - 30 minutes, 99025 - 30 minutes, 39404 - 30 minutes, 46965 - 30 minutes (5 additional units)    EVALUATION FINDINGS:  The diagnostic interview revealed that Ms. Estrada is an 87 year-old cisgender female referred for psychological evaluation prior to surgical implantation of a spinal cord stimulator (SCS) to address chronic pain in her lower back and sciatic pain that radiates down her legs. Ms. Estrada reported that her pain has been present for the past 12 years. Her activities are greatly limited, and she has tried numerous pain treatments without success. Ms. Estrada is now interested in a trial of SCS. She understood risks of surgery and indicated no significant concerns. She has reasonable expectations for SCS and will consider other treatment options in the future if SCS is ineffective. Ms. Estrada does not meet criteria  for any DSM-5 psychiatric diagnoses at this time, and she denied history of mental health disorder.  She denied problems with substance abuse, suicidal or homicidal ideation, psychotic symptoms, and cognitive functioning.    The medical record also revealed the following diagnoses:    Past Medical History:   Diagnosis Date    Arthritis     Cancer     skin cancer    Cataract     Done OU    Episode of hypertension 12/28/2012    pt states she does not have, Maybe white coat syndrome    GERD (gastroesophageal reflux disease)     Glaucoma     suspect    Hyperlipidemia     Ocular hypertension     Spinal stenosis      TEST DATA:  All tests were administered according to standardized procedures and were selected based on the reason for referral.  Effort on all tests was satisfactory to produce valid results.    MMPI-3.  The MMPI-3 provides an assessment of personality and psychopathology with specific evaluation of psychosocial risk factors associated with outcomes of spinal cord stimulation.  Ms. Estrada produced a valid and interpretable MMPI-3 profile, answering items relevantly based on content. Therefore, her responses should be considered a relatively accurate reflection of her current psychological functioning.    TEST RESULTS.  Ms. Estrada reports no significant dysfunction in cognition, emotion, behavior or interpersonal relationships.  RISK ASSESSMENTS.  The following likelihoods are based on test results and research, and are correlational rather than causational. No pre- or post-surgery risks are indicated.  RECOMMENDATIONS.  Test data provides treatment recommendations that may help Ms. Estrada achieve better outcomes with SCS. No recommendations are indicated by Ms. Estrada MMPI-3 report.    PAIRS and PCS.  The PAIRS and PCS reveal beliefs and attitudes related to pain that may impact outcomes of spinal cord stimulation.  The PAIRS indicated significant complications related to perceptions of impairment with  a total score of 87 (a score over 75 is clinically significant).  The PCS indicated non-significant catastrophizing of pain with a total score of 23, which is in the 57th percentile (a score over 30 is in the 75th percentile and is clinically significant).  Her subscale scores indicated non-significant Rumination (65th percentile), non-significant Magnification (50th  percentile), and non-significant Helplessness (60th percentile).    FEEDBACK.  Ms. Estrada was provided with test results and offered the opportunity to respond to feedback and clarify results if needed.    DIAGNOSTIC IMPRESSIONS:  Ms. Estrada does not meet criteria for any DSM-5 psychiatric disorder.    SUMMARY AND RECOMMENDATIONS:  Ms. Estrada has a long history of severe pain and is pursuing the spinal cord stimulator (SCS) in an effort to improve pain and quality of life.  Test results should be considered valid, and indicate that she reports no significant psychiatric dysfunction.  Based on research and recommendations regarding presurgical psychological screening for pain control procedures, her test results and reports are within the expected range to predict adequate outcomes and patient satisfaction.      Ms. Holt testing profile was largely consistent with her reports in the clinical interview.  In the clinical interview, Ms. Estrada denied a history of psychiatric problems or adjustment issues.  There are no recommendations for psychological intervention at this time.  This evaluation revealed NO contraindications to SCS from a psychological perspective.        Report and interpretation and coding were completed on 04/15/2022.

## 2022-04-26 ENCOUNTER — TELEPHONE (OUTPATIENT)
Dept: PAIN MEDICINE | Facility: CLINIC | Age: 87
End: 2022-04-26

## 2022-04-26 ENCOUNTER — OFFICE VISIT (OUTPATIENT)
Dept: PAIN MEDICINE | Facility: CLINIC | Age: 87
End: 2022-04-26
Payer: MEDICARE

## 2022-04-26 VITALS
HEIGHT: 65 IN | HEART RATE: 77 BPM | BODY MASS INDEX: 25.93 KG/M2 | SYSTOLIC BLOOD PRESSURE: 188 MMHG | DIASTOLIC BLOOD PRESSURE: 81 MMHG | WEIGHT: 155.63 LBS

## 2022-04-26 DIAGNOSIS — G89.4 CHRONIC PAIN SYNDROME: Primary | ICD-10-CM

## 2022-04-26 DIAGNOSIS — M47.816 LUMBAR SPONDYLOSIS: ICD-10-CM

## 2022-04-26 DIAGNOSIS — M48.062 SPINAL STENOSIS OF LUMBAR REGION WITH NEUROGENIC CLAUDICATION: ICD-10-CM

## 2022-04-26 DIAGNOSIS — M54.16 LUMBAR RADICULOPATHY: ICD-10-CM

## 2022-04-26 DIAGNOSIS — Z11.9 SCREENING EXAMINATION FOR INFECTIOUS DISEASE: ICD-10-CM

## 2022-04-26 PROCEDURE — 99214 PR OFFICE/OUTPT VISIT, EST, LEVL IV, 30-39 MIN: ICD-10-PCS | Mod: S$PBB,,,

## 2022-04-26 PROCEDURE — 99999 PR PBB SHADOW E&M-EST. PATIENT-LVL III: CPT | Mod: PBBFAC,,,

## 2022-04-26 PROCEDURE — 87081 CULTURE SCREEN ONLY: CPT

## 2022-04-26 PROCEDURE — 99214 OFFICE O/P EST MOD 30 MIN: CPT | Mod: S$PBB,,,

## 2022-04-26 PROCEDURE — 99213 OFFICE O/P EST LOW 20 MIN: CPT | Mod: PBBFAC,PN

## 2022-04-26 PROCEDURE — 99999 PR PBB SHADOW E&M-EST. PATIENT-LVL III: ICD-10-PCS | Mod: PBBFAC,,,

## 2022-04-26 RX ORDER — TRAMADOL HYDROCHLORIDE 50 MG/1
50 TABLET ORAL 3 TIMES DAILY PRN
Qty: 21 TABLET | Refills: 0 | Status: SHIPPED | OUTPATIENT
Start: 2022-04-26 | End: 2022-04-28 | Stop reason: SDUPTHER

## 2022-04-26 RX ORDER — SODIUM CHLORIDE, SODIUM LACTATE, POTASSIUM CHLORIDE, CALCIUM CHLORIDE 600; 310; 30; 20 MG/100ML; MG/100ML; MG/100ML; MG/100ML
INJECTION, SOLUTION INTRAVENOUS CONTINUOUS
Status: CANCELLED | OUTPATIENT
Start: 2022-05-11

## 2022-04-26 NOTE — PROGRESS NOTES
[facility] Follow Up Evaluation    Referred by: Cristine Hidalgo PA-C  Reason for referral: back pain    CC:   Chief Complaint   Patient presents with    Low-back Pain    Leg Pain      Last 3 PDI Scores 4/26/2022 3/24/2022 11/23/2021   Pain Disability Index (PDI) 31 70 0       Interval HPI 4/26/2022: Libby Estrada returns to the clinic for follow up.  She continues to report severe low back pain 6-10/10, with radiation into bilateral legs, worse with ambulating and with associated numbness in her right thigh.  As result of the pain she is often unable to use her Rollator and is requiring to use a wheelchair more often.  She continues to take gabapentin and Tylenol with only mild relief.  She previously tried tramadol and did not tolerate the side effects however recently her pain was severe enough were she tried it again and reported no side effects.  She is now believing she previously may have had food poisoning that was contributing to her nausea and shortness of breath.  She has now been taking the tramadol twice daily with moderate relief of her pain.  She denies any ill side effects or adverse events from the medication.  She denies any weakness changes with her bowel or bladder function.      Pain Intervention History:    - s/p L5/S1 LENY on 11/11/20 with about 50% relief of her low back pain.  - s/p L5/S1 LENY on 12/20/21 with 50% relief.    HPI:   Libby Estrada is a 87 y.o. female who complains of back pain    Onset: about 8 years  Inciting Event: none  Progression: since onset, pain is gradually worsening   Current Pain Score: 10/10  Typical Range: 6-10/10  Timing: constant  Quality: burning, sharp, stabbing   Radiation: yes, down the back of both legs L>R  Associated numbness or weakness: no numbness, no weakness   Exacerbated by: standing, walking, bending  Allievated by: rest, sitting, medications, laying down.   Is Pain Level Acceptable?: No    Previous Therapies:  PT/OT: yes, in the past  HEP:    Interventions:   Surgery:  Medications:   - NSAIDS:   - MSK Relaxants: baclofen, Robaxin  - TCAs:   - SNRIs:  Cymbalta- did not tolerate  - Topicals:   - Anticonvulsants: gabapentin  - Opioids:  Tramadol    History:    Current Outpatient Medications:     acetaminophen (TYLENOL ARTHRITIS PAIN ORAL), Take by mouth., Disp: , Rfl:     dorzolamide-timolol 2-0.5% (COSOPT) 22.3-6.8 mg/mL ophthalmic solution, USE ONE DROP IN EACH EYE TWICE DAILY. (50 DAY SUPPLY PER 10ML), Disp: 30 mL, Rfl: 3    ezetimibe (ZETIA) 10 mg tablet, TAKE ONE TABLET BY MOUTH ONCE DAILY, Disp: 90 tablet, Rfl: 3    gabapentin (NEURONTIN) 300 MG capsule, Take 1 capsule (300 mg total) by mouth 3 (three) times daily., Disp: 90 capsule, Rfl: 2    latanoprost 0.005 % ophthalmic solution, PLACE ONE DROP IN EACH EYE EVERY EVENING, Disp: 2.5 mL, Rfl: 12    methocarbamoL (ROBAXIN) 500 MG Tab, Take 1 tablet (500 mg total) by mouth 2 (two) times daily as needed (muscle spasms)., Disp: 30 tablet, Rfl: 0    MULTIVITAMIN W-MINERALS/LUTEIN (CENTRUM SILVER ORAL), Take by mouth., Disp: , Rfl:     vitamin D 1000 units Tab, Take 1,000 Units by mouth once daily., Disp: , Rfl:     baclofen (LIORESAL) 10 MG tablet, Take 1 tablet (10 mg total) by mouth 3 (three) times daily., Disp: 60 tablet, Rfl: 01    COVID-19 vacc,mRNA,Moderna,/PF (MODERNA COVID-19 VACCINE, EUA, IM), PHARMACY ADMINISTERED, Disp: , Rfl:     Past Medical History:   Diagnosis Date    Arthritis     Cancer     skin cancer    Cataract     Done OU    Episode of hypertension 12/28/2012    pt states she does not have, Maybe white coat syndrome    GERD (gastroesophageal reflux disease)     Glaucoma     suspect    Hyperlipidemia     Ocular hypertension     Spinal stenosis        Past Surgical History:   Procedure Laterality Date    APPENDECTOMY      CATARACT EXTRACTION W/  INTRAOCULAR LENS IMPLANT Right 07/08/2015    Dr Engel    CATARACT EXTRACTION W/  INTRAOCULAR LENS IMPLANT Left  2020    Dr Francois    [facility]      EPIDURAL STEROID INJECTION INTO LUMBAR SPINE N/A 2020    Procedure: Injection-steroid-epidural-lumbar L5/S1;  Surgeon: Cachorro Figueroa MD;  Location: [facility] OR;  Service: Pain Management;  Laterality: N/A;    EPIDURAL STEROID INJECTION INTO LUMBAR SPINE N/A 2021    Procedure: Injection-steroid-epidural-lumbar L5/S1 to the left;  Surgeon: Cachorro Figueroa MD;  Location: [facility] OR;  Service: Pain Management;  Laterality: N/A;    LUMBAR EPIDURAL INJECTION      MOUTH SURGERY      dental implants    PHACOEMULSIFICATION OF CATARACT Left 2020    Procedure: PHACOEMULSIFICATION, CATARACT;  Surgeon: Francis Farncois Jr., MD;  Location: [facility] OR;  Service: Ophthalmology;  Laterality: Left;  Left    Yag Capsulotomy Right 10/01/2021    Dr Francois       Family History   Problem Relation Age of Onset    Diabetes Mother     Heart attack Mother     Cancer Maternal Aunt         ovarian    Heart attack Father     Arthritis Father     Heart disease Father          at age 68    Heart attack Sister     Heart attack Brother     Amblyopia Neg Hx     Blindness Neg Hx     Cataracts Neg Hx     Hypertension Neg Hx     Macular degeneration Neg Hx     Retinal detachment Neg Hx     Strabismus Neg Hx     Stroke Neg Hx     Thyroid disease Neg Hx     Glaucoma Neg Hx        Social History     Socioeconomic History    Marital status:    Occupational History    Occupation: retired   Tobacco Use    Smoking status: Never Smoker    Smokeless tobacco: Never Used   Substance and Sexual Activity    Alcohol use: Yes     Alcohol/week: 2.0 standard drinks     Types: 2 Glasses of wine per week    Drug use: No    Sexual activity: Not Currently     Social Determinants of Health     Financial Resource Strain: Low Risk     Difficulty of Paying Living Expenses: Not very hard   Food Insecurity: No Food Insecurity    Worried About Running Out of Food in the Last  "Year: Never true    Ran Out of Food in the Last Year: Never true   Transportation Needs: No Transportation Needs    Lack of Transportation (Medical): No    Lack of Transportation (Non-Medical): No   Physical Activity: Inactive    Days of Exercise per Week: 3 days    Minutes of Exercise per Session: 0 min   Stress: No Stress Concern Present    Feeling of Stress : Not at all   Social Connections: Unknown    Frequency of Communication with Friends and Family: More than three times a week    Frequency of Social Gatherings with Friends and Family: More than three times a week    Active Member of Clubs or Organizations: Yes    Attends Club or Organization Meetings: More than 4 times per year    Marital Status:    Housing Stability: Low Risk     Unable to Pay for Housing in the Last Year: No    Number of Places Lived in the Last Year: 1    Unstable Housing in the Last Year: No       Review of patient's allergies indicates:   Allergen Reactions    Cymbalta [duloxetine] Nausea And Vomiting    Polysporin [bacitracin-polymyxin b] Rash    Statins-hmg-coa reductase inhibitors      "muscles freezing and could not use them"    Codeine Nausea And Vomiting    Gamma immune glob from whey Other (See Comments)     shakes    Lidocaine Itching    Penicillins Hives    Sulfa (sulfonamide antibiotics) Swelling    Bacitracin Rash       Review of Systems:  General ROS: negative for - fever  Psychological ROS: negative for - hostility  Hematological and Lymphatic ROS: negative for - bleeding problems  Endocrine ROS: negative for - unexpected weight changes  Respiratory ROS: no cough, shortness of breath, or wheezing  Cardiovascular ROS: no chest pain or dyspnea on exertion  Gastrointestinal ROS: no abdominal pain, change in bowel habits, or black or bloody stools  Musculoskeletal ROS: negative for - muscular weakness  Neurological ROS: negative for - bowel and bladder control changes or " "numbness/tingling  Dermatological ROS: negative for rash    Physical Exam:  Vitals:    04/26/22 1026   BP: (!) 188/81   Pulse: 77   Weight: 70.6 kg (155 lb 10.3 oz)   Height: 5' 5" (1.651 m)   PainSc:   6   PainLoc: Back     Body mass index is 25.9 kg/m².     Gen: NAD, pleasant, well groomed  Gait:  Presented in wheelchair  Psych:  Mood appropriate for given condition  HEENT: eyes anicteric   GI: Abd soft  CV: RRR  Lungs: breathing unlabored   ROM: limited AROM of the L spine in all planes, full ROM at ankles, knees and hips  Lumbar flexion 90 degrees, extension 50 degrees, side bending 30 degrees.    Sensation: intact to light touch in all dermatomes tested from L2-S1 bilaterally  Reflexes: 0/0 b/l patella and 0/0 b/l achilles  Palpation:  -TTP over the b/l greater trochanters and bilateral SI joint  Tone: normal in the b/l knees and hips   Skin: intact  Extremities: No edema in b/l ankles or hands  Provacative tests:        Right Left   L2/3 Iliacus Hip flexion  5  5   L3/4 Qudratus Femoris Knee Extension  5  5   L4/5 Tib Anterior Ankle Dorsiflexion   5  5   L5/S1 Extensor Hallicus Longus Great toe extension  5  5                 S1/S2 Gastroc/Soleus Plantar Flexion  5  5       Imaging:  MRI lumbar spine 5/17/19  FINDINGS:  The conus ends at T12-L1.  No diffuse abnormal marrow or central conus signal is appreciated. No paraspinal fluid collections are appreciated. Osseous alignment appears maintained. There is multilevel facet, ligamentous hypertrophy mid to lower lumbar predominantly similar as well as maintained alignment demonstrating multilevel disc space narrowing, desiccation and marginal anterolisthesis L4-5.  There is Modic endplate degeneration at L2-3 slightly increased overall.  No interval extruded  type fragment is appreciated.     L1-2 demonstrates some broad-based concentric disc bulging, thecal sac triangulation as well as mild lateral recess, inferior neural foraminal narrowing " bilaterally.  L2-3 demonstrates broad-based concentric disc bulging with thecal sac triangulation as well as moderate neural foraminal narrowing bilaterally right slightly more so than left.  L3-4 demonstrates broad-based concentric disc bulging with thecal sac triangulation, narrowing overall.  There is some annular fissuring as well as moderate left, moderate to significant right neural foraminal narrowing with some posterior element hypertrophy, spurring.  L4-5 demonstrates broad-based concentric disc bulging with annular fissuring.  There is moderate neural foraminal narrowing demonstrated bilaterally with spinal canal narrowing overall along with exuberant posterior element, ligamentous hypertrophy.  L5-S1 demonstrates some broad-based concentric disc bulging with annular fissuring and small to moderate central protrusion.  There is significant neural foraminal narrowing demonstrated on the left as well as moderate right.    MRI lumbar spine 12/5/21  FINDINGS:  NOMENCLATURE: Five lumbar type vertebral bodies.     CORD/CAUDA EQUINA: Conus has normal size and signal and ends at a normal level of L1-L2.     ALIGNMENT: Trace retrolisthesis of L1 on L2, L2 on L3 and L3 on L4.  4 mm grade 1 anterolisthesis of L4 on L5.  levoscoliosis with a Segundo angle of 18.1 degrees using the superior T12 and inferior L5 endplates (series 8, image 9).     BONES: Vertebral body heights are maintained.  Multilevel endplate changes, greatest at L2-3 and L5-S1 where there are type 1 endplate changes.  Prominent type 2 endplate changes on the right at L3-4.  Hemangioma in the left T12 pedicle and S1 vertebral body.  No aggressive bone marrow signal.     PARASPINAL AREA: Normal.     LUMBAR DISC LEVELS:     T12-L1: No disc herniation or significant posterior osteophytic ridging.  Ligamentum flavum thickening.  No significant spinal canal or foraminal stenosis.  L1-L2: Trace retrolisthesis.  Mild disc bulge.  Disc height loss.  Ligamentum  flavum thickening.  Mild narrowing of the bilateral lateral recesses.  No significant spinal canal stenosis.  Mild bilateral foraminal stenosis.  L2-L3: Trace retrolisthesis.  Mild disc bulge.  Disc height loss.  Ligamentum flavum thickening.  Mild bilateral facet hypertrophy.  Mild right greater than left narrowing of the lateral recesses, minimally increased from prior study.  No significant spinal canal stenosis.  Mild-moderate right and mild left foraminal stenosis.  L3-L4: Trace retrolisthesis.  Mild disc bulge.  Disc height loss.  Bilateral facet hypertrophy.  Ligamentum flavum thickening.  Moderate right and mild-moderate left narrowing of the lateral recesses.  Mild spinal canal stenosis, minimally decreased from prior study.  Mild-moderate right and mild left foraminal stenosis.  This appears less pronounced on the right compared to prior study.  L4-L5: Anterolisthesis.  Unroofing of the disc and mild disc bulge.  Moderate narrowing of the bilateral lateral recesses.  Severe spinal canal stenosis, unchanged.  Mild bilateral foraminal stenosis, unchanged.  L5-S1: Mild disc bulge, eccentric to the left.  Moderate-marked left and mild right facet hypertrophy.  Ligamentum flavum thickening, greater on the left.  Moderate left and minimal right narrowing of the lateral recesses.  No significant spinal canal stenosis.  Moderate-severe left and minimal right foraminal stenosis.  This is progressed on the left compared to prior study.    Labs:  BMP  Lab Results   Component Value Date     02/16/2022    K 4.8 02/16/2022     02/16/2022    CO2 27 02/16/2022    BUN 16 02/16/2022    CREATININE 0.8 02/16/2022    CALCIUM 9.9 02/16/2022    ANIONGAP 10 02/16/2022    ESTGFRAFRICA >60.0 02/16/2022    EGFRNONAA >60.0 02/16/2022     Lab Results   Component Value Date    ALT 15 02/16/2022    AST 17 02/16/2022    ALKPHOS 66 02/16/2022    BILITOT 0.4 02/16/2022     Lab Results   Component Value Date    WBC 7.50  01/11/2022    HGB 13.0 01/11/2022    HCT 40.3 01/11/2022    MCV 93 01/11/2022     01/11/2022       Assessment:   Problem List Items Addressed This Visit        Neuro    Lumbar radiculopathy      Other Visit Diagnoses     Chronic pain syndrome    -  Primary    Spinal stenosis of lumbar region with neurogenic claudication        Lumbar spondylosis            87 y.o. year old female presents to the office with back pain.  She's had chronic lower back pain for the past 8 years that has been gradually worsening.      4/26/2022: Libby Estrada returns to the clinic for follow up.  She continues to report severe low back pain 6-10/10, with radiation into bilateral legs, worse with ambulating and with associated numbness in her right thigh.  As result of the pain she is often unable to use her Rollator and is requiring to use a wheelchair more often.  She continues to take gabapentin and Tylenol with only mild relief.  She previously tried tramadol and did not tolerate the side effects however recently her pain was severe enough were she tried it again and reported no side effects.  She is now believing she previously may have had food poisoning that was contributing to her nausea and shortness of breath.  She has now been taking the tramadol twice daily with moderate relief of her pain.  She denies any ill side effects or adverse events from the medication.  She denies any weakness changes with her bowel or bladder function.    -on exam today she has full strength.  Reflexes: 0/0 b/l patella and 0/0 b/l achilles  - I independently reviewed her updated lumbar MRI and it is c/w L4-5 severe spinal canal stenosis, unchanged from 2019 and L5-S1 moderate left and minimal right narrowing of the lateral recesses, moderate-severe left and minimal right foraminal stenosis that has progressed on the left compared to prior study.  - she continues to have severe pain in recently retry the tramadol which she tolerated.  The  tramadol is providing her with moderate relief.  - she continues to take Tylenol, ibuprofen and gabapentin 300 mg TID with mild relief.  - the pain is limiting her mobility interfering with her ADLs.  She is often unable to ambulate long distances with a Rollator and is now needing a wheelchair more often than not due to the pain.  - she did not tolerate the side effects of the most recent LENY as she had episode of transient confusion after that epidural.  - she recently saw Psychiatry and was approved for a spinal cord stimulator trial.  - will proceed with spinal cord stimulator trial with [employer]  - today we discussed the stimulator as well as the trial.  All questions and concerns answered.  - continue gabapentin 300 mg TID.   - she requested a refill on her tramadol today.   - follow up after spinal cord stimulator trial.      :  Reviewed    The total time spent for evaluation and management on 04/26/2022 including reviewing separately obtained history, performing a medically appropriate exam and evaluation, documenting clinical information in the health record, independently interpreting results and communicating them to the patient/family/caregiver, and ordering medications/tests/procedures was between 30-39 minutes.

## 2022-04-26 NOTE — H&P (VIEW-ONLY)
[facility] Follow Up Evaluation    Referred by: Cristine Hidalgo PA-C  Reason for referral: back pain    CC:   Chief Complaint   Patient presents with    Low-back Pain    Leg Pain      Last 3 PDI Scores 4/26/2022 3/24/2022 11/23/2021   Pain Disability Index (PDI) 31 70 0       Interval HPI 4/26/2022: Libby Estrada returns to the clinic for follow up.  She continues to report severe low back pain 6-10/10, with radiation into bilateral legs, worse with ambulating and with associated numbness in her right thigh.  As result of the pain she is often unable to use her Rollator and is requiring to use a wheelchair more often.  She continues to take gabapentin and Tylenol with only mild relief.  She previously tried tramadol and did not tolerate the side effects however recently her pain was severe enough were she tried it again and reported no side effects.  She is now believing she previously may have had food poisoning that was contributing to her nausea and shortness of breath.  She has now been taking the tramadol twice daily with moderate relief of her pain.  She denies any ill side effects or adverse events from the medication.  She denies any weakness changes with her bowel or bladder function.      Pain Intervention History:    - s/p L5/S1 LENY on 11/11/20 with about 50% relief of her low back pain.  - s/p L5/S1 LENY on 12/20/21 with 50% relief.    HPI:   Libby Estrada is a 87 y.o. female who complains of back pain    Onset: about 8 years  Inciting Event: none  Progression: since onset, pain is gradually worsening   Current Pain Score: 10/10  Typical Range: 6-10/10  Timing: constant  Quality: burning, sharp, stabbing   Radiation: yes, down the back of both legs L>R  Associated numbness or weakness: no numbness, no weakness   Exacerbated by: standing, walking, bending  Allievated by: rest, sitting, medications, laying down.   Is Pain Level Acceptable?: No    Previous Therapies:  PT/OT: yes, in the past  HEP:    Interventions:   Surgery:  Medications:   - NSAIDS:   - MSK Relaxants: baclofen, Robaxin  - TCAs:   - SNRIs:  Cymbalta- did not tolerate  - Topicals:   - Anticonvulsants: gabapentin  - Opioids:  Tramadol    History:    Current Outpatient Medications:     acetaminophen (TYLENOL ARTHRITIS PAIN ORAL), Take by mouth., Disp: , Rfl:     dorzolamide-timolol 2-0.5% (COSOPT) 22.3-6.8 mg/mL ophthalmic solution, USE ONE DROP IN EACH EYE TWICE DAILY. (50 DAY SUPPLY PER 10ML), Disp: 30 mL, Rfl: 3    ezetimibe (ZETIA) 10 mg tablet, TAKE ONE TABLET BY MOUTH ONCE DAILY, Disp: 90 tablet, Rfl: 3    gabapentin (NEURONTIN) 300 MG capsule, Take 1 capsule (300 mg total) by mouth 3 (three) times daily., Disp: 90 capsule, Rfl: 2    latanoprost 0.005 % ophthalmic solution, PLACE ONE DROP IN EACH EYE EVERY EVENING, Disp: 2.5 mL, Rfl: 12    methocarbamoL (ROBAXIN) 500 MG Tab, Take 1 tablet (500 mg total) by mouth 2 (two) times daily as needed (muscle spasms)., Disp: 30 tablet, Rfl: 0    MULTIVITAMIN W-MINERALS/LUTEIN (CENTRUM SILVER ORAL), Take by mouth., Disp: , Rfl:     vitamin D 1000 units Tab, Take 1,000 Units by mouth once daily., Disp: , Rfl:     baclofen (LIORESAL) 10 MG tablet, Take 1 tablet (10 mg total) by mouth 3 (three) times daily., Disp: 60 tablet, Rfl: 01    COVID-19 vacc,mRNA,Moderna,/PF (MODERNA COVID-19 VACCINE, EUA, IM), PHARMACY ADMINISTERED, Disp: , Rfl:     Past Medical History:   Diagnosis Date    Arthritis     Cancer     skin cancer    Cataract     Done OU    Episode of hypertension 12/28/2012    pt states she does not have, Maybe white coat syndrome    GERD (gastroesophageal reflux disease)     Glaucoma     suspect    Hyperlipidemia     Ocular hypertension     Spinal stenosis        Past Surgical History:   Procedure Laterality Date    APPENDECTOMY      CATARACT EXTRACTION W/  INTRAOCULAR LENS IMPLANT Right 07/08/2015    Dr Engel    CATARACT EXTRACTION W/  INTRAOCULAR LENS IMPLANT Left  2020    Dr Francois    [facility]      EPIDURAL STEROID INJECTION INTO LUMBAR SPINE N/A 2020    Procedure: Injection-steroid-epidural-lumbar L5/S1;  Surgeon: Cachorro Figueroa MD;  Location: [facility] OR;  Service: Pain Management;  Laterality: N/A;    EPIDURAL STEROID INJECTION INTO LUMBAR SPINE N/A 2021    Procedure: Injection-steroid-epidural-lumbar L5/S1 to the left;  Surgeon: Cachorro Figueroa MD;  Location: [facility] OR;  Service: Pain Management;  Laterality: N/A;    LUMBAR EPIDURAL INJECTION      MOUTH SURGERY      dental implants    PHACOEMULSIFICATION OF CATARACT Left 2020    Procedure: PHACOEMULSIFICATION, CATARACT;  Surgeon: Francis Francois Jr., MD;  Location: [facility] OR;  Service: Ophthalmology;  Laterality: Left;  Left    Yag Capsulotomy Right 10/01/2021    Dr Francois       Family History   Problem Relation Age of Onset    Diabetes Mother     Heart attack Mother     Cancer Maternal Aunt         ovarian    Heart attack Father     Arthritis Father     Heart disease Father          at age 68    Heart attack Sister     Heart attack Brother     Amblyopia Neg Hx     Blindness Neg Hx     Cataracts Neg Hx     Hypertension Neg Hx     Macular degeneration Neg Hx     Retinal detachment Neg Hx     Strabismus Neg Hx     Stroke Neg Hx     Thyroid disease Neg Hx     Glaucoma Neg Hx        Social History     Socioeconomic History    Marital status:    Occupational History    Occupation: retired   Tobacco Use    Smoking status: Never Smoker    Smokeless tobacco: Never Used   Substance and Sexual Activity    Alcohol use: Yes     Alcohol/week: 2.0 standard drinks     Types: 2 Glasses of wine per week    Drug use: No    Sexual activity: Not Currently     Social Determinants of Health     Financial Resource Strain: Low Risk     Difficulty of Paying Living Expenses: Not very hard   Food Insecurity: No Food Insecurity    Worried About Running Out of Food in the Last  "Year: Never true    Ran Out of Food in the Last Year: Never true   Transportation Needs: No Transportation Needs    Lack of Transportation (Medical): No    Lack of Transportation (Non-Medical): No   Physical Activity: Inactive    Days of Exercise per Week: 3 days    Minutes of Exercise per Session: 0 min   Stress: No Stress Concern Present    Feeling of Stress : Not at all   Social Connections: Unknown    Frequency of Communication with Friends and Family: More than three times a week    Frequency of Social Gatherings with Friends and Family: More than three times a week    Active Member of Clubs or Organizations: Yes    Attends Club or Organization Meetings: More than 4 times per year    Marital Status:    Housing Stability: Low Risk     Unable to Pay for Housing in the Last Year: No    Number of Places Lived in the Last Year: 1    Unstable Housing in the Last Year: No       Review of patient's allergies indicates:   Allergen Reactions    Cymbalta [duloxetine] Nausea And Vomiting    Polysporin [bacitracin-polymyxin b] Rash    Statins-hmg-coa reductase inhibitors      "muscles freezing and could not use them"    Codeine Nausea And Vomiting    Gamma immune glob from whey Other (See Comments)     shakes    Lidocaine Itching    Penicillins Hives    Sulfa (sulfonamide antibiotics) Swelling    Bacitracin Rash       Review of Systems:  General ROS: negative for - fever  Psychological ROS: negative for - hostility  Hematological and Lymphatic ROS: negative for - bleeding problems  Endocrine ROS: negative for - unexpected weight changes  Respiratory ROS: no cough, shortness of breath, or wheezing  Cardiovascular ROS: no chest pain or dyspnea on exertion  Gastrointestinal ROS: no abdominal pain, change in bowel habits, or black or bloody stools  Musculoskeletal ROS: negative for - muscular weakness  Neurological ROS: negative for - bowel and bladder control changes or " "numbness/tingling  Dermatological ROS: negative for rash    Physical Exam:  Vitals:    04/26/22 1026   BP: (!) 188/81   Pulse: 77   Weight: 70.6 kg (155 lb 10.3 oz)   Height: 5' 5" (1.651 m)   PainSc:   6   PainLoc: Back     Body mass index is 25.9 kg/m².     Gen: NAD, pleasant, well groomed  Gait:  Presented in wheelchair  Psych:  Mood appropriate for given condition  HEENT: eyes anicteric   GI: Abd soft  CV: RRR  Lungs: breathing unlabored   ROM: limited AROM of the L spine in all planes, full ROM at ankles, knees and hips  Lumbar flexion 90 degrees, extension 50 degrees, side bending 30 degrees.    Sensation: intact to light touch in all dermatomes tested from L2-S1 bilaterally  Reflexes: 0/0 b/l patella and 0/0 b/l achilles  Palpation:  -TTP over the b/l greater trochanters and bilateral SI joint  Tone: normal in the b/l knees and hips   Skin: intact  Extremities: No edema in b/l ankles or hands  Provacative tests:        Right Left   L2/3 Iliacus Hip flexion  5  5   L3/4 Qudratus Femoris Knee Extension  5  5   L4/5 Tib Anterior Ankle Dorsiflexion   5  5   L5/S1 Extensor Hallicus Longus Great toe extension  5  5                 S1/S2 Gastroc/Soleus Plantar Flexion  5  5       Imaging:  MRI lumbar spine 5/17/19  FINDINGS:  The conus ends at T12-L1.  No diffuse abnormal marrow or central conus signal is appreciated. No paraspinal fluid collections are appreciated. Osseous alignment appears maintained. There is multilevel facet, ligamentous hypertrophy mid to lower lumbar predominantly similar as well as maintained alignment demonstrating multilevel disc space narrowing, desiccation and marginal anterolisthesis L4-5.  There is Modic endplate degeneration at L2-3 slightly increased overall.  No interval extruded  type fragment is appreciated.     L1-2 demonstrates some broad-based concentric disc bulging, thecal sac triangulation as well as mild lateral recess, inferior neural foraminal narrowing " bilaterally.  L2-3 demonstrates broad-based concentric disc bulging with thecal sac triangulation as well as moderate neural foraminal narrowing bilaterally right slightly more so than left.  L3-4 demonstrates broad-based concentric disc bulging with thecal sac triangulation, narrowing overall.  There is some annular fissuring as well as moderate left, moderate to significant right neural foraminal narrowing with some posterior element hypertrophy, spurring.  L4-5 demonstrates broad-based concentric disc bulging with annular fissuring.  There is moderate neural foraminal narrowing demonstrated bilaterally with spinal canal narrowing overall along with exuberant posterior element, ligamentous hypertrophy.  L5-S1 demonstrates some broad-based concentric disc bulging with annular fissuring and small to moderate central protrusion.  There is significant neural foraminal narrowing demonstrated on the left as well as moderate right.    MRI lumbar spine 12/5/21  FINDINGS:  NOMENCLATURE: Five lumbar type vertebral bodies.     CORD/CAUDA EQUINA: Conus has normal size and signal and ends at a normal level of L1-L2.     ALIGNMENT: Trace retrolisthesis of L1 on L2, L2 on L3 and L3 on L4.  4 mm grade 1 anterolisthesis of L4 on L5.  levoscoliosis with a Segundo angle of 18.1 degrees using the superior T12 and inferior L5 endplates (series 8, image 9).     BONES: Vertebral body heights are maintained.  Multilevel endplate changes, greatest at L2-3 and L5-S1 where there are type 1 endplate changes.  Prominent type 2 endplate changes on the right at L3-4.  Hemangioma in the left T12 pedicle and S1 vertebral body.  No aggressive bone marrow signal.     PARASPINAL AREA: Normal.     LUMBAR DISC LEVELS:     T12-L1: No disc herniation or significant posterior osteophytic ridging.  Ligamentum flavum thickening.  No significant spinal canal or foraminal stenosis.  L1-L2: Trace retrolisthesis.  Mild disc bulge.  Disc height loss.  Ligamentum  flavum thickening.  Mild narrowing of the bilateral lateral recesses.  No significant spinal canal stenosis.  Mild bilateral foraminal stenosis.  L2-L3: Trace retrolisthesis.  Mild disc bulge.  Disc height loss.  Ligamentum flavum thickening.  Mild bilateral facet hypertrophy.  Mild right greater than left narrowing of the lateral recesses, minimally increased from prior study.  No significant spinal canal stenosis.  Mild-moderate right and mild left foraminal stenosis.  L3-L4: Trace retrolisthesis.  Mild disc bulge.  Disc height loss.  Bilateral facet hypertrophy.  Ligamentum flavum thickening.  Moderate right and mild-moderate left narrowing of the lateral recesses.  Mild spinal canal stenosis, minimally decreased from prior study.  Mild-moderate right and mild left foraminal stenosis.  This appears less pronounced on the right compared to prior study.  L4-L5: Anterolisthesis.  Unroofing of the disc and mild disc bulge.  Moderate narrowing of the bilateral lateral recesses.  Severe spinal canal stenosis, unchanged.  Mild bilateral foraminal stenosis, unchanged.  L5-S1: Mild disc bulge, eccentric to the left.  Moderate-marked left and mild right facet hypertrophy.  Ligamentum flavum thickening, greater on the left.  Moderate left and minimal right narrowing of the lateral recesses.  No significant spinal canal stenosis.  Moderate-severe left and minimal right foraminal stenosis.  This is progressed on the left compared to prior study.    Labs:  BMP  Lab Results   Component Value Date     02/16/2022    K 4.8 02/16/2022     02/16/2022    CO2 27 02/16/2022    BUN 16 02/16/2022    CREATININE 0.8 02/16/2022    CALCIUM 9.9 02/16/2022    ANIONGAP 10 02/16/2022    ESTGFRAFRICA >60.0 02/16/2022    EGFRNONAA >60.0 02/16/2022     Lab Results   Component Value Date    ALT 15 02/16/2022    AST 17 02/16/2022    ALKPHOS 66 02/16/2022    BILITOT 0.4 02/16/2022     Lab Results   Component Value Date    WBC 7.50  01/11/2022    HGB 13.0 01/11/2022    HCT 40.3 01/11/2022    MCV 93 01/11/2022     01/11/2022       Assessment:   Problem List Items Addressed This Visit        Neuro    Lumbar radiculopathy      Other Visit Diagnoses     Chronic pain syndrome    -  Primary    Spinal stenosis of lumbar region with neurogenic claudication        Lumbar spondylosis            87 y.o. year old female presents to the office with back pain.  She's had chronic lower back pain for the past 8 years that has been gradually worsening.      4/26/2022: Libby Estrada returns to the clinic for follow up.  She continues to report severe low back pain 6-10/10, with radiation into bilateral legs, worse with ambulating and with associated numbness in her right thigh.  As result of the pain she is often unable to use her Rollator and is requiring to use a wheelchair more often.  She continues to take gabapentin and Tylenol with only mild relief.  She previously tried tramadol and did not tolerate the side effects however recently her pain was severe enough were she tried it again and reported no side effects.  She is now believing she previously may have had food poisoning that was contributing to her nausea and shortness of breath.  She has now been taking the tramadol twice daily with moderate relief of her pain.  She denies any ill side effects or adverse events from the medication.  She denies any weakness changes with her bowel or bladder function.    -on exam today she has full strength.  Reflexes: 0/0 b/l patella and 0/0 b/l achilles  - I independently reviewed her updated lumbar MRI and it is c/w L4-5 severe spinal canal stenosis, unchanged from 2019 and L5-S1 moderate left and minimal right narrowing of the lateral recesses, moderate-severe left and minimal right foraminal stenosis that has progressed on the left compared to prior study.  - she continues to have severe pain in recently retry the tramadol which she tolerated.  The  tramadol is providing her with moderate relief.  - she continues to take Tylenol, ibuprofen and gabapentin 300 mg TID with mild relief.  - the pain is limiting her mobility interfering with her ADLs.  She is often unable to ambulate long distances with a Rollator and is now needing a wheelchair more often than not due to the pain.  - she did not tolerate the side effects of the most recent LENY as she had episode of transient confusion after that epidural.  - she recently saw Psychiatry and was approved for a spinal cord stimulator trial.  - will proceed with spinal cord stimulator trial with [employer]  - today we discussed the stimulator as well as the trial.  All questions and concerns answered.  - continue gabapentin 300 mg TID.   - she requested a refill on her tramadol today.   - follow up after spinal cord stimulator trial.      :  Reviewed    The total time spent for evaluation and management on 04/26/2022 including reviewing separately obtained history, performing a medically appropriate exam and evaluation, documenting clinical information in the health record, independently interpreting results and communicating them to the patient/family/caregiver, and ordering medications/tests/procedures was between 30-39 minutes.

## 2022-04-28 DIAGNOSIS — G89.4 CHRONIC PAIN SYNDROME: Primary | ICD-10-CM

## 2022-04-28 LAB — MRSA SPEC QL CULT: NORMAL

## 2022-04-28 RX ORDER — TRAMADOL HYDROCHLORIDE 50 MG/1
50 TABLET ORAL 3 TIMES DAILY PRN
Qty: 21 TABLET | Refills: 0 | Status: SHIPPED | OUTPATIENT
Start: 2022-04-28 | End: 2022-05-08

## 2022-05-09 ENCOUNTER — PATIENT MESSAGE (OUTPATIENT)
Dept: SMOKING CESSATION | Facility: CLINIC | Age: 87
End: 2022-05-09
Payer: MEDICARE

## 2022-05-10 ENCOUNTER — ANESTHESIA EVENT (OUTPATIENT)
Dept: SURGERY | Facility: HOSPITAL | Age: 87
End: 2022-05-10
Payer: MEDICARE

## 2022-05-11 ENCOUNTER — ANESTHESIA (OUTPATIENT)
Dept: SURGERY | Facility: HOSPITAL | Age: 87
End: 2022-05-11
Payer: MEDICARE

## 2022-05-11 ENCOUNTER — HOSPITAL ENCOUNTER (OUTPATIENT)
Facility: HOSPITAL | Age: 87
Discharge: HOME | End: 2022-05-11
Attending: ANESTHESIOLOGY | Admitting: ANESTHESIOLOGY
Payer: MEDICARE

## 2022-05-11 ENCOUNTER — HOSPITAL ENCOUNTER (OUTPATIENT)
Dept: RADIOLOGY | Facility: HOSPITAL | Age: 87
Discharge: HOME | End: 2022-05-11
Attending: ANESTHESIOLOGY
Payer: MEDICARE

## 2022-05-11 VITALS
TEMPERATURE: 97 F | OXYGEN SATURATION: 98 % | WEIGHT: 150 LBS | DIASTOLIC BLOOD PRESSURE: 71 MMHG | HEIGHT: 65 IN | RESPIRATION RATE: 15 BRPM | HEART RATE: 91 BPM | SYSTOLIC BLOOD PRESSURE: 152 MMHG | BODY MASS INDEX: 24.99 KG/M2

## 2022-05-11 DIAGNOSIS — G89.4 CHRONIC PAIN SYNDROME: Primary | ICD-10-CM

## 2022-05-11 DIAGNOSIS — M54.50 LOWER BACK PAIN: ICD-10-CM

## 2022-05-11 DIAGNOSIS — M54.16 LUMBAR RADICULOPATHY: ICD-10-CM

## 2022-05-11 PROCEDURE — 37000008 HC ANESTHESIA 1ST 15 MINUTES: Mod: PO | Performed by: ANESTHESIOLOGY

## 2022-05-11 PROCEDURE — 63650 IMPLANT NEUROELECTRODES: CPT | Mod: ,,, | Performed by: ANESTHESIOLOGY

## 2022-05-11 PROCEDURE — C1897 LEAD, NEUROSTIM TEST KIT: HCPCS | Mod: PO | Performed by: ANESTHESIOLOGY

## 2022-05-11 PROCEDURE — 25000003 PHARM REV CODE 250: Mod: PO | Performed by: ANESTHESIOLOGY

## 2022-05-11 PROCEDURE — 76000 FLUOROSCOPY <1 HR PHYS/QHP: CPT | Mod: TC,PO

## 2022-05-11 PROCEDURE — D9220A PRA ANESTHESIA: Mod: ANES,,, | Performed by: ANESTHESIOLOGY

## 2022-05-11 PROCEDURE — 63650 PR PERCUT IMPLNT NEUROELECT,EPIDURAL: ICD-10-PCS | Mod: ,,, | Performed by: ANESTHESIOLOGY

## 2022-05-11 PROCEDURE — 25000003 PHARM REV CODE 250: Mod: PO | Performed by: NURSE ANESTHETIST, CERTIFIED REGISTERED

## 2022-05-11 PROCEDURE — 36000707: Mod: PO | Performed by: ANESTHESIOLOGY

## 2022-05-11 PROCEDURE — 63600175 PHARM REV CODE 636 W HCPCS: Mod: PO | Performed by: NURSE ANESTHETIST, CERTIFIED REGISTERED

## 2022-05-11 PROCEDURE — D9220A PRA ANESTHESIA: ICD-10-PCS | Mod: ANES,,, | Performed by: ANESTHESIOLOGY

## 2022-05-11 PROCEDURE — 36000706: Mod: PO | Performed by: ANESTHESIOLOGY

## 2022-05-11 PROCEDURE — 71000015 HC POSTOP RECOV 1ST HR: Mod: PO | Performed by: ANESTHESIOLOGY

## 2022-05-11 PROCEDURE — D9220A PRA ANESTHESIA: ICD-10-PCS | Mod: CRNA,,, | Performed by: NURSE ANESTHETIST, CERTIFIED REGISTERED

## 2022-05-11 PROCEDURE — 63600175 PHARM REV CODE 636 W HCPCS: Mod: PO | Performed by: ANESTHESIOLOGY

## 2022-05-11 PROCEDURE — D9220A PRA ANESTHESIA: Mod: CRNA,,, | Performed by: NURSE ANESTHETIST, CERTIFIED REGISTERED

## 2022-05-11 PROCEDURE — 37000009 HC ANESTHESIA EA ADD 15 MINS: Mod: PO | Performed by: ANESTHESIOLOGY

## 2022-05-11 DEVICE — KIT LEAD TRIAL OCTRODE 60CM: Type: IMPLANTABLE DEVICE | Site: BACK | Status: FUNCTIONAL

## 2022-05-11 RX ORDER — SODIUM CHLORIDE, SODIUM LACTATE, POTASSIUM CHLORIDE, CALCIUM CHLORIDE 600; 310; 30; 20 MG/100ML; MG/100ML; MG/100ML; MG/100ML
INJECTION, SOLUTION INTRAVENOUS CONTINUOUS
Status: DISCONTINUED | OUTPATIENT
Start: 2022-05-11 | End: 2022-05-11 | Stop reason: HOSPADM

## 2022-05-11 RX ORDER — LIDOCAINE HYDROCHLORIDE 20 MG/ML
INJECTION INTRAVENOUS
Status: DISCONTINUED | OUTPATIENT
Start: 2022-05-11 | End: 2022-05-11

## 2022-05-11 RX ORDER — LIDOCAINE HYDROCHLORIDE 10 MG/ML
INJECTION, SOLUTION EPIDURAL; INFILTRATION; INTRACAUDAL; PERINEURAL
Status: DISCONTINUED | OUTPATIENT
Start: 2022-05-11 | End: 2022-05-11 | Stop reason: HOSPADM

## 2022-05-11 RX ORDER — CLINDAMYCIN PHOSPHATE 900 MG/50ML
900 INJECTION, SOLUTION INTRAVENOUS
Status: COMPLETED | OUTPATIENT
Start: 2022-05-11 | End: 2022-05-11

## 2022-05-11 RX ORDER — FENTANYL CITRATE 50 UG/ML
INJECTION, SOLUTION INTRAMUSCULAR; INTRAVENOUS
Status: DISCONTINUED | OUTPATIENT
Start: 2022-05-11 | End: 2022-05-11

## 2022-05-11 RX ORDER — PROPOFOL 10 MG/ML
VIAL (ML) INTRAVENOUS CONTINUOUS PRN
Status: DISCONTINUED | OUTPATIENT
Start: 2022-05-11 | End: 2022-05-11

## 2022-05-11 RX ORDER — SODIUM CHLORIDE, SODIUM LACTATE, POTASSIUM CHLORIDE, CALCIUM CHLORIDE 600; 310; 30; 20 MG/100ML; MG/100ML; MG/100ML; MG/100ML
125 INJECTION, SOLUTION INTRAVENOUS CONTINUOUS
Status: DISCONTINUED | OUTPATIENT
Start: 2022-05-11 | End: 2022-05-11 | Stop reason: HOSPADM

## 2022-05-11 RX ORDER — DEXAMETHASONE SODIUM PHOSPHATE 4 MG/ML
INJECTION, SOLUTION INTRA-ARTICULAR; INTRALESIONAL; INTRAMUSCULAR; INTRAVENOUS; SOFT TISSUE
Status: DISCONTINUED | OUTPATIENT
Start: 2022-05-11 | End: 2022-05-11

## 2022-05-11 RX ORDER — ONDANSETRON 2 MG/ML
INJECTION INTRAMUSCULAR; INTRAVENOUS
Status: DISCONTINUED | OUTPATIENT
Start: 2022-05-11 | End: 2022-05-11

## 2022-05-11 RX ORDER — ONDANSETRON 2 MG/ML
4 INJECTION INTRAMUSCULAR; INTRAVENOUS ONCE AS NEEDED
Status: DISCONTINUED | OUTPATIENT
Start: 2022-05-11 | End: 2022-05-11 | Stop reason: HOSPADM

## 2022-05-11 RX ORDER — KETAMINE HCL IN 0.9 % NACL 50 MG/5 ML
SYRINGE (ML) INTRAVENOUS
Status: DISCONTINUED | OUTPATIENT
Start: 2022-05-11 | End: 2022-05-11

## 2022-05-11 RX ORDER — ACETAMINOPHEN 10 MG/ML
INJECTION, SOLUTION INTRAVENOUS
Status: DISCONTINUED | OUTPATIENT
Start: 2022-05-11 | End: 2022-05-11

## 2022-05-11 RX ORDER — PROPOFOL 10 MG/ML
VIAL (ML) INTRAVENOUS
Status: DISCONTINUED | OUTPATIENT
Start: 2022-05-11 | End: 2022-05-11

## 2022-05-11 RX ORDER — FENTANYL CITRATE 50 UG/ML
25 INJECTION, SOLUTION INTRAMUSCULAR; INTRAVENOUS EVERY 5 MIN PRN
Status: DISCONTINUED | OUTPATIENT
Start: 2022-05-11 | End: 2022-05-11 | Stop reason: HOSPADM

## 2022-05-11 RX ORDER — TRAMADOL HYDROCHLORIDE 50 MG/1
50 TABLET ORAL EVERY 6 HOURS PRN
COMMUNITY
End: 2022-05-19

## 2022-05-11 RX ORDER — LABETALOL HYDROCHLORIDE 5 MG/ML
INJECTION, SOLUTION INTRAVENOUS
Status: DISCONTINUED | OUTPATIENT
Start: 2022-05-11 | End: 2022-05-11

## 2022-05-11 RX ORDER — LIDOCAINE HYDROCHLORIDE 10 MG/ML
1 INJECTION, SOLUTION EPIDURAL; INFILTRATION; INTRACAUDAL; PERINEURAL ONCE
Status: COMPLETED | OUTPATIENT
Start: 2022-05-11 | End: 2022-05-11

## 2022-05-11 RX ADMIN — PROPOFOL 50 MG: 10 INJECTION, EMULSION INTRAVENOUS at 12:05

## 2022-05-11 RX ADMIN — ACETAMINOPHEN 1000 MG: 10 INJECTION, SOLUTION INTRAVENOUS at 12:05

## 2022-05-11 RX ADMIN — LIDOCAINE HYDROCHLORIDE 80 MG: 20 INJECTION, SOLUTION INTRAVENOUS at 12:05

## 2022-05-11 RX ADMIN — DEXAMETHASONE SODIUM PHOSPHATE 4 MG: 4 INJECTION, SOLUTION INTRAMUSCULAR; INTRAVENOUS at 12:05

## 2022-05-11 RX ADMIN — SODIUM CHLORIDE, SODIUM LACTATE, POTASSIUM CHLORIDE, AND CALCIUM CHLORIDE: .6; .31; .03; .02 INJECTION, SOLUTION INTRAVENOUS at 12:05

## 2022-05-11 RX ADMIN — FENTANYL CITRATE 25 MCG: 50 INJECTION, SOLUTION INTRAMUSCULAR; INTRAVENOUS at 12:05

## 2022-05-11 RX ADMIN — ONDANSETRON 4 MG: 2 INJECTION INTRAMUSCULAR; INTRAVENOUS at 12:05

## 2022-05-11 RX ADMIN — LIDOCAINE HYDROCHLORIDE 10 MG: 10 INJECTION, SOLUTION EPIDURAL; INFILTRATION; INTRACAUDAL; PERINEURAL at 12:05

## 2022-05-11 RX ADMIN — PROPOFOL 100 MCG/KG/MIN: 10 INJECTION, EMULSION INTRAVENOUS at 12:05

## 2022-05-11 RX ADMIN — LABETALOL HYDROCHLORIDE 10 MG: 5 INJECTION, SOLUTION INTRAVENOUS at 12:05

## 2022-05-11 RX ADMIN — TACROLIMUS 900 MG: 0.5 CAPSULE ORAL at 12:05

## 2022-05-11 RX ADMIN — Medication 10 MG: at 12:05

## 2022-05-11 NOTE — TRANSFER OF CARE
"Anesthesia Transfer of Care Note    Patient: Libby Estrada    Procedure(s) Performed: Procedure(s) (LRB):  INSERTION, NEUROSTIMULATOR, SPINAL CORD, DORSAL COLUMN, FOR TRIAL (Bilateral)    Patient location: PACU    Anesthesia Type: MAC    Transport from OR: Transported from OR on 2-3 L/min O2 by NC with adequate spontaneous ventilation    Post pain: adequate analgesia    Post assessment: no apparent anesthetic complications and tolerated procedure well    Post vital signs: stable    Level of consciousness: responds to stimulation    Nausea/Vomiting: no nausea/vomiting    Complications: none    Transfer of care protocol was followed      Last vitals:   Visit Vitals  BP (!) 214/93 (BP Location: Left arm, Patient Position: Lying)   Pulse 104   Temp 36.2 °C (97.1 °F) (Skin)   Resp 18   Ht 5' 5" (1.651 m)   Wt 68 kg (150 lb)   LMP  (LMP Unknown)   SpO2 96%   Breastfeeding No   BMI 24.96 kg/m²     "

## 2022-05-11 NOTE — INTERVAL H&P NOTE
The patient has been examined and the H&P has been reviewed:    I concur with the findings and no changes have occurred since H&P was written.  She has seen Dr. Cristina in the past and surgery was not recommended.  The patient has back pain and right sided radicular pain with intermittent tingling in the right leg.  On exam today no numbness or weakness.  She has severe central canal narrowing at L4-5 but also multilevel bilateral foraminal narrowing to varying degrees.  Discussed SCS trial with patient and her daughter.  Given absence of weakness or numbness we will proceed with a SCS trial as it is less invasive option compared to surgery. If she doesn't find relief with the trial then I will refer her for another surgical opinion with one of our neurosurgeons over here.    The risks and benefits of this intervention, and alternative therapies were discussed with the patient.  The discussion of risks included infection, bleeding, need for additional procedures or surgery, nerve damage.  Questions regarding the procedure, risks, expected outcome, and possible side effects were solicited and answered to the patient's satisfaction.  Libby Estrada wishes to proceed with the injection or procedure.  Written consent was obtained.      There are no hospital problems to display for this patient.

## 2022-05-11 NOTE — ANESTHESIA PREPROCEDURE EVALUATION
05/11/2022  Libby Estrada is a 87 y.o., female.    Pre-op Assessment    I have reviewed the Patient Summary Reports.    I have reviewed the Nursing Notes. I have reviewed the NPO Status.   I have reviewed the Medications.     Review of Systems  EENT/Dental:   Recent PHACO's   Cardiovascular:   Exercise tolerance: good Hypertension, well controlled    Pulmonary:  Pulmonary Normal    Hepatic/GI:   GERD    Musculoskeletal:   Spinal stenosis, pain Spine Disorders: lumbar    Psych:  Psychiatric Normal           Physical Exam  General:  Well nourished      Airway/Jaw/Neck:  Airway Findings: Mouth Opening: Normal   Tongue: Normal   General Airway Assessment: Adult Mallampati: III  Improves to II with phonation.      Dental:  Dental Findings: In tact     Chest/Lungs:  Chest/Lungs Findings: Clear to auscultation, Normal Respiratory Rate      Heart/Vascular:  Heart Findings: Rate: Normal  Rhythm: Regular Rhythm  Sounds: Normal        Mental Status:  Mental Status Findings:  Cooperative, Alert and Oriented         Anesthesia Plan  Type of Anesthesia, risks & benefits discussed:  Anesthesia Type:  MAC    Patient's Preference:   Plan Factors:          Intra-op Monitoring Plan: standard ASA monitors  Intra-op Monitoring Plan Comments:   Post Op Pain Control Plan: multimodal analgesia and IV/PO Opioids PRN  Post Op Pain Control Plan Comments:     Induction:   IV  Beta Blocker:  Patient is not currently on a Beta-Blocker (No further documentation required).       Informed Consent: Informed consent signed with the Patient and all parties understand the risks and agree with anesthesia plan.  All questions answered.  Anesthesia consent signed with patient.  ASA Score: 2     Day of Surgery Review of History & Physical: I have interviewed and examined the patient. I have reviewed the patient's H&P dated:  There are no  significant changes.            Ready For Surgery From Anesthesia Perspective.           Physical Exam  General: Well nourished    Airway:  Mallampati: III / II  Mouth Opening: Normal  Tongue: Normal    Dental:  In tact    Chest/Lungs:  Clear to auscultation, Normal Respiratory Rate    Heart:  Rate: Normal  Rhythm: Regular Rhythm  Sounds: Normal          Anesthesia Plan  Type of Anesthesia, risks & benefits discussed:    Anesthesia Type: MAC  Intra-op Monitoring Plan: standard ASA monitors  Post Op Pain Control Plan: multimodal analgesia and IV/PO Opioids PRN  Induction:  IV  Informed Consent: Informed consent signed with the Patient and all parties understand the risks and agree with anesthesia plan.  All questions answered.   ASA Score: 2  Day of Surgery Review of History & Physical: I have interviewed and examined the patient. I have reviewed the patient's H&P dated:     Ready For Surgery From Anesthesia Perspective.       .

## 2022-05-11 NOTE — DISCHARGE SUMMARY
[facility]  Discharge Note  Short Stay    Admit Date: 5/11/2022    Discharge Date: 5/11/2022    Attending Physician: Cachorro Figueroa     Discharge Provider: Cachorro Figueroa    Diagnoses:  There are no hospital problems to display for this patient.      Discharged Condition: Good    Final Diagnoses: Chronic pain syndrome [G89.4]  Lumbar radiculopathy [M54.16]    Disposition: Home or Self Care    Hospital Course: No complications, uneventful    Outcome of Hospitalization, Treatment, Procedure, or Surgery:  Patient was admitted for outpatient interventional pain management procedure. The patient tolerated the procedure well with no complications.    Follow up/Patient Instructions:  Follow up as scheduled in Pain Management office in 2-3 weeks.  Patient has received instructions and follow up date and time.    Medications:  Continue previous medications    Discharge Procedure Orders   Notify your health care provider if you experience any of the following:  temperature >100.4     Notify your health care provider if you experience any of the following:  persistent nausea and vomiting or diarrhea     Notify your health care provider if you experience any of the following:  severe uncontrolled pain     Notify your health care provider if you experience any of the following:  redness, tenderness, or signs of infection (pain, swelling, redness, odor or green/yellow discharge around incision site)     Notify your health care provider if you experience any of the following:  difficulty breathing or increased cough     Notify your health care provider if you experience any of the following:  severe persistent headache     Notify your health care provider if you experience any of the following:  worsening rash     Notify your health care provider if you experience any of the following:  persistent dizziness, light-headedness, or visual disturbances     Notify your health care provider if you experience any of the  following:  increased confusion or weakness     Activity as tolerated

## 2022-05-11 NOTE — ANESTHESIA POSTPROCEDURE EVALUATION
Anesthesia Post Evaluation    Patient: Libby Estrada    Procedure(s) Performed: Procedure(s) (LRB):  INSERTION, NEUROSTIMULATOR, SPINAL CORD, DORSAL COLUMN, FOR TRIAL (Bilateral)    Final Anesthesia Type: MAC      Patient location during evaluation: PACU  Patient participation: Yes- Able to Participate  Level of consciousness: awake  Post-procedure vital signs: reviewed and stable  Pain management: adequate  Airway patency: patent    PONV status at discharge: No PONV  Anesthetic complications: no      Cardiovascular status: blood pressure returned to baseline  Respiratory status: spontaneous ventilation  Hydration status: euvolemic  Follow-up not needed.          Vitals Value Taken Time   /56 05/11/22 1338   Temp 36.3 °C (97.4 °F) 05/11/22 1338   Pulse 93 05/11/22 1338   Resp 16 05/11/22 1338   SpO2 96 % 05/11/22 1338         No case tracking events are documented in the log.      Pain/Jem Score: Jem Score: 10 (5/11/2022  1:38 PM)

## 2022-05-11 NOTE — DISCHARGE INSTRUCTIONS
SPINAL CORD STIMULATOR    Sponge bath only until follow up with the doctor.  No bending, lifting or twisting.   Do not make any movements that cause bandages to pull.  Do not lift your elbows higher than your shoulders.  Do not remove dressings.  No strenuous activities.  Follow up with your physician in one week.  Call your doctor for excessive bleeding or swelling.  Rep will call you tomorrow. Phone number is on the box.    Call [phone] for doctor.

## 2022-05-11 NOTE — OP NOTE
PROCEDURE DATE: 5/11/2022    Procedure  1. Percutaneous insertion of spinal cord stimulator leads x 2. Total of 16 contacts.   2. Fluoroscopic needle guidance.   3. Intraoperative complex programming of Spinal Cord Stimulator, >30mins.     Pre-op Diagnosis: : 1) Chronic pain syndrome  2) lumbar radiculopathy    Post-op Diagnosis: Same    Surgeon: Queenie Figueroa M.D.    Anesthesia: MAC per Anesthesia Provider    Blood Loss: none    Complications: none    PROCEDURE NOTE: After obtaining written informed consent patient was taken to the procedure room. Pre-procedure blood pressure and pulse were stable and recorded in patients clinic chart. Pre-op IV antibiotics were started by the Anesthesia provider.    The patient was taken to the operating room and placed in prone position. Routine monitors were applied and IV anesthesia was titrated to effect by the Anesthesia provider. The patient's back and buttocks were prepped and draped in sterile fashion using betadine and then DuraPrep solution and sterile drapes were applied. C-arm fluoroscopy was used to identify the L1-2 interspace. Through a 1% local lidocaine skin wheal, a small stab incision was made with a #11 blade scapel. Through this, a 14-gauge Tuohy needle was advanced until contact was made with the right-sided L1 lamina. It was then walked off in a cephalad medial direction using loss of resistance to technique entering into the epidural space. Once within the epidural space, an epidural spinal cord stimulator lead was advanced until the tip of the lead rested at the top of the T7 vertebral body. Following this, another percutaneous lead was placed with another Tuohy needle on the left side following the same procedure as described for the other side. Once all the leads were in place, stimulation testing was carried out by the device representative under my guidance. Once the leads were noted to be within proper position with patient reporting stimulation in the  painful areas, the needle was removed. The leads were secured to the patient's back using 2-0 silk. Skin was cleaned, bacitracin applied and a sterile dressing was applied.    Following the procedure the patient's vital signs were stable. The patient was taken to the recovery room in good condition with no known complication. The patient was allowed to fully awaken from anesthesia and final programming of the device was done by the device representative under my guidance. The patient was discharged home in good condition after being given discharge instructions.

## 2022-05-16 ENCOUNTER — OFFICE VISIT (OUTPATIENT)
Dept: PAIN MEDICINE | Facility: CLINIC | Age: 87
End: 2022-05-16
Payer: MEDICARE

## 2022-05-16 VITALS
BODY MASS INDEX: 25 KG/M2 | DIASTOLIC BLOOD PRESSURE: 93 MMHG | WEIGHT: 155.56 LBS | HEART RATE: 95 BPM | HEIGHT: 66 IN | SYSTOLIC BLOOD PRESSURE: 181 MMHG

## 2022-05-16 DIAGNOSIS — M48.062 SPINAL STENOSIS OF LUMBAR REGION WITH NEUROGENIC CLAUDICATION: ICD-10-CM

## 2022-05-16 DIAGNOSIS — M54.16 LUMBAR RADICULOPATHY: ICD-10-CM

## 2022-05-16 DIAGNOSIS — G89.4 CHRONIC PAIN SYNDROME: Primary | ICD-10-CM

## 2022-05-16 DIAGNOSIS — M47.816 LUMBAR SPONDYLOSIS: ICD-10-CM

## 2022-05-16 DIAGNOSIS — G89.4 CHRONIC PAIN SYNDROME: ICD-10-CM

## 2022-05-16 DIAGNOSIS — M54.16 LUMBAR RADICULOPATHY: Primary | ICD-10-CM

## 2022-05-16 PROCEDURE — 99024 PR POST-OP FOLLOW-UP VISIT: ICD-10-PCS | Mod: S$PBB,,, | Performed by: ANESTHESIOLOGY

## 2022-05-16 PROCEDURE — 99999 PR PBB SHADOW E&M-EST. PATIENT-LVL III: ICD-10-PCS | Mod: PBBFAC,,, | Performed by: ANESTHESIOLOGY

## 2022-05-16 PROCEDURE — 99213 OFFICE O/P EST LOW 20 MIN: CPT | Mod: PBBFAC,PN | Performed by: ANESTHESIOLOGY

## 2022-05-16 PROCEDURE — 99024 POSTOP FOLLOW-UP VISIT: CPT | Mod: S$PBB,,, | Performed by: ANESTHESIOLOGY

## 2022-05-16 PROCEDURE — 99999 PR PBB SHADOW E&M-EST. PATIENT-LVL III: CPT | Mod: PBBFAC,,, | Performed by: ANESTHESIOLOGY

## 2022-05-16 RX ORDER — SODIUM CHLORIDE, SODIUM LACTATE, POTASSIUM CHLORIDE, CALCIUM CHLORIDE 600; 310; 30; 20 MG/100ML; MG/100ML; MG/100ML; MG/100ML
INJECTION, SOLUTION INTRAVENOUS CONTINUOUS
Status: CANCELLED | OUTPATIENT
Start: 2022-05-16

## 2022-05-16 NOTE — PROGRESS NOTES
[facility] Follow Up Evaluation    Referred by: Cristine Hidalgo PA-C  Reason for referral: back pain    CC:   Chief Complaint   Patient presents with    Back Pain      Last 3 PDI Scores 5/16/2022 4/26/2022 3/24/2022   Pain Disability Index (PDI) 42 31 70       Interval HPI 5/16/2022: Libby Estrada returns to the office for follow up.  She is status post SCS trial on 05/11/2022.  She reports nearly 100% improvement in her mobility as she reports she is able to stand up straight while ambulating and she reports he had 85% relief of her back pain and her leg pain.  Overall she is extremely satisfied with the trial.    Pain Intervention History:    - s/p L5/S1 LENY on 11/11/20 with about 50% relief of her low back pain.  - s/p L5/S1 LENY on 12/20/21 with 50% relief.    HPI:   Libby Estrada is a 87 y.o. female who complains of back pain    Onset: about 8 years  Inciting Event: none  Progression: since onset, pain is gradually worsening   Current Pain Score: 10/10  Typical Range: 6-10/10  Timing: constant  Quality: burning, sharp, stabbing   Radiation: yes, down the back of both legs L>R  Associated numbness or weakness: no numbness, no weakness   Exacerbated by: standing, walking, bending  Allievated by: rest, sitting, medications, laying down.   Is Pain Level Acceptable?: No    Previous Therapies:  PT/OT: yes, in the past  HEP:   Interventions:   Surgery:  Medications:   - NSAIDS:   - MSK Relaxants: baclofen, Robaxin  - TCAs:   - SNRIs:  Cymbalta- did not tolerate  - Topicals:   - Anticonvulsants: gabapentin  - Opioids:  Tramadol    History:    Current Outpatient Medications:     acetaminophen (TYLENOL ARTHRITIS PAIN ORAL), Take by mouth., Disp: , Rfl:     baclofen (LIORESAL) 10 MG tablet, Take 1 tablet (10 mg total) by mouth 3 (three) times daily., Disp: 60 tablet, Rfl: 01    COVID-19 vacc,mRNA,Moderna,/PF (MODERNA COVID-19 VACCINE, EUA, IM), PHARMACY ADMINISTERED, Disp: , Rfl:     dorzolamide-timolol  2-0.5% (COSOPT) 22.3-6.8 mg/mL ophthalmic solution, USE ONE DROP IN EACH EYE TWICE DAILY. (50 DAY SUPPLY PER 10ML), Disp: 30 mL, Rfl: 3    ezetimibe (ZETIA) 10 mg tablet, TAKE ONE TABLET BY MOUTH ONCE DAILY, Disp: 90 tablet, Rfl: 3    gabapentin (NEURONTIN) 300 MG capsule, Take 1 capsule (300 mg total) by mouth 3 (three) times daily., Disp: 90 capsule, Rfl: 2    latanoprost 0.005 % ophthalmic solution, PLACE ONE DROP IN EACH EYE EVERY EVENING, Disp: 2.5 mL, Rfl: 12    MULTIVITAMIN W-MINERALS/LUTEIN (CENTRUM SILVER ORAL), Take by mouth., Disp: , Rfl:     traMADoL (ULTRAM) 50 mg tablet, Take 50 mg by mouth every 6 (six) hours as needed for Pain., Disp: , Rfl:     vitamin D 1000 units Tab, Take 1,000 Units by mouth once daily., Disp: , Rfl:     Past Medical History:   Diagnosis Date    Arthritis     Cancer     skin cancer    Cataract     Done OU    Episode of hypertension 12/28/2012    pt states she does not have, Maybe white coat syndrome    GERD (gastroesophageal reflux disease)     Glaucoma     suspect    Hyperlipidemia     Ocular hypertension     Spinal stenosis        Past Surgical History:   Procedure Laterality Date    APPENDECTOMY      CATARACT EXTRACTION W/  INTRAOCULAR LENS IMPLANT Right 07/08/2015    Dr Engel    CATARACT EXTRACTION W/  INTRAOCULAR LENS IMPLANT Left 01/16/2020    Dr Francois    COLONOSCOPY      EPIDURAL STEROID INJECTION INTO LUMBAR SPINE N/A 11/11/2020    Procedure: Injection-steroid-epidural-lumbar L5/S1;  Surgeon: Cachorro Figueroa MD;  Location: [facility] OR;  Service: Pain Management;  Laterality: N/A;    EPIDURAL STEROID INJECTION INTO LUMBAR SPINE N/A 12/20/2021    Procedure: Injection-steroid-epidural-lumbar L5/S1 to the left;  Surgeon: Cachorro Figueroa MD;  Location: [facility] OR;  Service: Pain Management;  Laterality: N/A;    INSERTION OF DORSAL COLUMN NERVE STIMULATOR FOR TRIAL Bilateral 5/11/2022    Procedure: INSERTION, NEUROSTIMULATOR, SPINAL CORD, DORSAL COLUMN,  FOR TRIAL;  Surgeon: Cachorro Figueroa MD;  Location: [facility] OR;  Service: Pain Management;  Laterality: Bilateral;    LUMBAR EPIDURAL INJECTION      MOUTH SURGERY      dental implants    PHACOEMULSIFICATION OF CATARACT Left 2020    Procedure: PHACOEMULSIFICATION, CATARACT;  Surgeon: Francis Francois Jr., MD;  Location: [facility] OR;  Service: Ophthalmology;  Laterality: Left;  Left    Yag Capsulotomy Right 10/01/2021    Dr Francois       Family History   Problem Relation Age of Onset    Diabetes Mother     Heart attack Mother     Cancer Maternal Aunt         ovarian    Heart attack Father     Arthritis Father     Heart disease Father          at age 68    Heart attack Sister     Heart attack Brother     Amblyopia Neg Hx     Blindness Neg Hx     Cataracts Neg Hx     Hypertension Neg Hx     Macular degeneration Neg Hx     Retinal detachment Neg Hx     Strabismus Neg Hx     Stroke Neg Hx     Thyroid disease Neg Hx     Glaucoma Neg Hx        Social History     Socioeconomic History    Marital status:    Occupational History    Occupation: retired   Tobacco Use    Smoking status: Never Smoker    Smokeless tobacco: Never Used   Substance and Sexual Activity    Alcohol use: Yes     Alcohol/week: 2.0 standard drinks     Types: 2 Glasses of wine per week    Drug use: No    Sexual activity: Not Currently     Social Determinants of Health     Financial Resource Strain: Low Risk     Difficulty of Paying Living Expenses: Not very hard   Food Insecurity: No Food Insecurity    Worried About Running Out of Food in the Last Year: Never true    Ran Out of Food in the Last Year: Never true   Transportation Needs: No Transportation Needs    Lack of Transportation (Medical): No    Lack of Transportation (Non-Medical): No   Physical Activity: Inactive    Days of Exercise per Week: 3 days    Minutes of Exercise per Session: 0 min   Stress: No Stress Concern Present    Feeling of Stress : Not at  "all   Social Connections: Unknown    Frequency of Communication with Friends and Family: More than three times a week    Frequency of Social Gatherings with Friends and Family: More than three times a week    Active Member of Clubs or Organizations: Yes    Attends Club or Organization Meetings: More than 4 times per year    Marital Status:    Housing Stability: Low Risk     Unable to Pay for Housing in the Last Year: No    Number of Places Lived in the Last Year: 1    Unstable Housing in the Last Year: No       Review of patient's allergies indicates:   Allergen Reactions    Cymbalta [duloxetine] Nausea And Vomiting    Polysporin [bacitracin-polymyxin b] Rash    Statins-hmg-coa reductase inhibitors      "muscles freezing and could not use them"    Codeine Nausea And Vomiting    Gamma immune glob from whey Other (See Comments)     shakes    Lidocaine Itching     Topical lidocaine    Penicillins Hives    Sulfa (sulfonamide antibiotics) Swelling    Bacitracin Rash       Review of Systems:  General ROS: negative for - fever  Psychological ROS: negative for - hostility  Hematological and Lymphatic ROS: negative for - bleeding problems  Endocrine ROS: negative for - unexpected weight changes  Respiratory ROS: no cough, shortness of breath, or wheezing  Cardiovascular ROS: no chest pain or dyspnea on exertion  Gastrointestinal ROS: no abdominal pain, change in bowel habits, or black or bloody stools  Musculoskeletal ROS: negative for - muscular weakness  Neurological ROS: negative for - bowel and bladder control changes or numbness/tingling  Dermatological ROS: negative for rash    Physical Exam:  Vitals:    05/16/22 1305   BP: (!) 181/93   Pulse: 95   Weight: 70.5 kg (155 lb 8.6 oz)   Height: 5' 6" (1.676 m)   PainSc:   6   PainLoc: Back     Body mass index is 25.1 kg/m².     Gen: NAD, pleasant, well groomed  Gait:  Presented in wheelchair  Psych:  Mood appropriate for given condition  HEENT: eyes " anicteric   GI: Abd soft  CV: RRR  Lungs: breathing unlabored   ROM: limited AROM of the L spine in all planes, full ROM at ankles, knees and hips  Lumbar flexion 90 degrees, extension 50 degrees, side bending 30 degrees.    Sensation: intact to light touch in all dermatomes tested from L2-S1 bilaterally  Reflexes: 0/0 b/l patella and 0/0 b/l achilles  Palpation:  -TTP over the b/l greater trochanters and bilateral SI joint  Tone: normal in the b/l knees and hips   Skin: intact  Extremities: No edema in b/l ankles or hands  Provacative tests:        Right Left   L2/3 Iliacus Hip flexion  5  5   L3/4 Qudratus Femoris Knee Extension  5  5   L4/5 Tib Anterior Ankle Dorsiflexion   5  5   L5/S1 Extensor Hallicus Longus Great toe extension  5  5                 S1/S2 Gastroc/Soleus Plantar Flexion  5  5       Imaging:  MRI lumbar spine 5/17/19  FINDINGS:  The conus ends at T12-L1.  No diffuse abnormal marrow or central conus signal is appreciated. No paraspinal fluid collections are appreciated. Osseous alignment appears maintained. There is multilevel facet, ligamentous hypertrophy mid to lower lumbar predominantly similar as well as maintained alignment demonstrating multilevel disc space narrowing, desiccation and marginal anterolisthesis L4-5.  There is Modic endplate degeneration at L2-3 slightly increased overall.  No interval extruded  type fragment is appreciated.     L1-2 demonstrates some broad-based concentric disc bulging, thecal sac triangulation as well as mild lateral recess, inferior neural foraminal narrowing bilaterally.  L2-3 demonstrates broad-based concentric disc bulging with thecal sac triangulation as well as moderate neural foraminal narrowing bilaterally right slightly more so than left.  L3-4 demonstrates broad-based concentric disc bulging with thecal sac triangulation, narrowing overall.  There is some annular fissuring as well as moderate left, moderate to significant right neural  foraminal narrowing with some posterior element hypertrophy, spurring.  L4-5 demonstrates broad-based concentric disc bulging with annular fissuring.  There is moderate neural foraminal narrowing demonstrated bilaterally with spinal canal narrowing overall along with exuberant posterior element, ligamentous hypertrophy.  L5-S1 demonstrates some broad-based concentric disc bulging with annular fissuring and small to moderate central protrusion.  There is significant neural foraminal narrowing demonstrated on the left as well as moderate right.    MRI lumbar spine 12/5/21  FINDINGS:  NOMENCLATURE: Five lumbar type vertebral bodies.     CORD/CAUDA EQUINA: Conus has normal size and signal and ends at a normal level of L1-L2.     ALIGNMENT: Trace retrolisthesis of L1 on L2, L2 on L3 and L3 on L4.  4 mm grade 1 anterolisthesis of L4 on L5.  levoscoliosis with a Segundo angle of 18.1 degrees using the superior T12 and inferior L5 endplates (series 8, image 9).     BONES: Vertebral body heights are maintained.  Multilevel endplate changes, greatest at L2-3 and L5-S1 where there are type 1 endplate changes.  Prominent type 2 endplate changes on the right at L3-4.  Hemangioma in the left T12 pedicle and S1 vertebral body.  No aggressive bone marrow signal.     PARASPINAL AREA: Normal.     LUMBAR DISC LEVELS:     T12-L1: No disc herniation or significant posterior osteophytic ridging.  Ligamentum flavum thickening.  No significant spinal canal or foraminal stenosis.  L1-L2: Trace retrolisthesis.  Mild disc bulge.  Disc height loss.  Ligamentum flavum thickening.  Mild narrowing of the bilateral lateral recesses.  No significant spinal canal stenosis.  Mild bilateral foraminal stenosis.  L2-L3: Trace retrolisthesis.  Mild disc bulge.  Disc height loss.  Ligamentum flavum thickening.  Mild bilateral facet hypertrophy.  Mild right greater than left narrowing of the lateral recesses, minimally increased from prior study.  No  significant spinal canal stenosis.  Mild-moderate right and mild left foraminal stenosis.  L3-L4: Trace retrolisthesis.  Mild disc bulge.  Disc height loss.  Bilateral facet hypertrophy.  Ligamentum flavum thickening.  Moderate right and mild-moderate left narrowing of the lateral recesses.  Mild spinal canal stenosis, minimally decreased from prior study.  Mild-moderate right and mild left foraminal stenosis.  This appears less pronounced on the right compared to prior study.  L4-L5: Anterolisthesis.  Unroofing of the disc and mild disc bulge.  Moderate narrowing of the bilateral lateral recesses.  Severe spinal canal stenosis, unchanged.  Mild bilateral foraminal stenosis, unchanged.  L5-S1: Mild disc bulge, eccentric to the left.  Moderate-marked left and mild right facet hypertrophy.  Ligamentum flavum thickening, greater on the left.  Moderate left and minimal right narrowing of the lateral recesses.  No significant spinal canal stenosis.  Moderate-severe left and minimal right foraminal stenosis.  This is progressed on the left compared to prior study.    Labs:  BMP  Lab Results   Component Value Date     02/16/2022    K 4.8 02/16/2022     02/16/2022    CO2 27 02/16/2022    BUN 16 02/16/2022    CREATININE 0.8 02/16/2022    CALCIUM 9.9 02/16/2022    ANIONGAP 10 02/16/2022    ESTGFRAFRICA >60.0 02/16/2022    EGFRNONAA >60.0 02/16/2022     Lab Results   Component Value Date    ALT 15 02/16/2022    AST 17 02/16/2022    ALKPHOS 66 02/16/2022    BILITOT 0.4 02/16/2022     Lab Results   Component Value Date    WBC 7.50 01/11/2022    HGB 13.0 01/11/2022    HCT 40.3 01/11/2022    MCV 93 01/11/2022     01/11/2022       Assessment:   Problem List Items Addressed This Visit        Neuro    Lumbar radiculopathy      Other Visit Diagnoses     Chronic pain syndrome    -  Primary        87 y.o. year old female presents to the office with back pain.  She's had chronic lower back pain for the past 8 years  that has been gradually worsening.      5/16/22: Libby Estrada returns to the office for follow up.  She is status post SCS trial on 05/11/2022.  She reports nearly 100% improvement in her mobility as she reports she is able to stand up straight while ambulating and she reports he had 85% relief of her back pain and her leg pain.  Overall she is extremely satisfied with the trial.    -on exam today she has full strength and intact sensation light touch.   - I independently reviewed her updated lumbar MRI and it is c/w L4-5 severe spinal canal stenosis, unchanged from 2019 and L5-S1 moderate left and minimal right narrowing of the lateral recesses, moderate-severe left and minimal right foraminal stenosis that has progressed on the left compared to prior study.  - prior to the SCS trial we discussed seeing a neurosurgeon however she has seen Dr. Cristina the years ago and surgery was not recommended at the time.  - she is now status post SCS trial and is very happy with the improvement in her pain and quality of life and mobility.  She wishes to proceed with SCS implant  - continue gabapentin as prescribed.  Continue tramadol p.r.n. for severe pain as prescribed  - we will schedule for SCS implant. The risks and benefits of this intervention, and alternative therapies were discussed with the patient.  The discussion of risks included infection, bleeding, need for additional procedures or surgery, nerve damage.  Questions regarding the procedure, risks, expected outcome, and possible side effects were solicited and answered to the patient's satisfaction.  Libby Estrada wishes to proceed with the injection or procedure.  Written consent was obtained.  - follow-up 1 week post implant      :  Reviewed    This note was completed with dictation software and grammatical errors may exist.      Lead Removal Procedure  Dressing removed to expose insertion sites in low back.  Inspection of the lead insertion site reveal  mild erythema around the leads without drainage.  Silk sutures anchoring the leads are intact.    Silk suture was cut with scissors and removed intact with pain or discomfort.  Leads were with drawn using gentle, continuous traction.  Inspection of the leads demonstrated they were removed intact with fracturing or breaking.  The skin was cleaned with chlorhexidine and an adhesive bandage applied.

## 2022-05-16 NOTE — H&P (VIEW-ONLY)
[facility] Follow Up Evaluation    Referred by: Cristine Hidalgo PA-C  Reason for referral: back pain    CC:   Chief Complaint   Patient presents with    Back Pain      Last 3 PDI Scores 5/16/2022 4/26/2022 3/24/2022   Pain Disability Index (PDI) 42 31 70       Interval HPI 5/16/2022: Libby Estrada returns to the office for follow up.  She is status post SCS trial on 05/11/2022.  She reports nearly 100% improvement in her mobility as she reports she is able to stand up straight while ambulating and she reports he had 85% relief of her back pain and her leg pain.  Overall she is extremely satisfied with the trial.    Pain Intervention History:    - s/p L5/S1 LENY on 11/11/20 with about 50% relief of her low back pain.  - s/p L5/S1 LENY on 12/20/21 with 50% relief.    HPI:   Libby Estrada is a 87 y.o. female who complains of back pain    Onset: about 8 years  Inciting Event: none  Progression: since onset, pain is gradually worsening   Current Pain Score: 10/10  Typical Range: 6-10/10  Timing: constant  Quality: burning, sharp, stabbing   Radiation: yes, down the back of both legs L>R  Associated numbness or weakness: no numbness, no weakness   Exacerbated by: standing, walking, bending  Allievated by: rest, sitting, medications, laying down.   Is Pain Level Acceptable?: No    Previous Therapies:  PT/OT: yes, in the past  HEP:   Interventions:   Surgery:  Medications:   - NSAIDS:   - MSK Relaxants: baclofen, Robaxin  - TCAs:   - SNRIs:  Cymbalta- did not tolerate  - Topicals:   - Anticonvulsants: gabapentin  - Opioids:  Tramadol    History:    Current Outpatient Medications:     acetaminophen (TYLENOL ARTHRITIS PAIN ORAL), Take by mouth., Disp: , Rfl:     baclofen (LIORESAL) 10 MG tablet, Take 1 tablet (10 mg total) by mouth 3 (three) times daily., Disp: 60 tablet, Rfl: 01    COVID-19 vacc,mRNA,Moderna,/PF (MODERNA COVID-19 VACCINE, EUA, IM), PHARMACY ADMINISTERED, Disp: , Rfl:     dorzolamide-timolol  2-0.5% (COSOPT) 22.3-6.8 mg/mL ophthalmic solution, USE ONE DROP IN EACH EYE TWICE DAILY. (50 DAY SUPPLY PER 10ML), Disp: 30 mL, Rfl: 3    ezetimibe (ZETIA) 10 mg tablet, TAKE ONE TABLET BY MOUTH ONCE DAILY, Disp: 90 tablet, Rfl: 3    gabapentin (NEURONTIN) 300 MG capsule, Take 1 capsule (300 mg total) by mouth 3 (three) times daily., Disp: 90 capsule, Rfl: 2    latanoprost 0.005 % ophthalmic solution, PLACE ONE DROP IN EACH EYE EVERY EVENING, Disp: 2.5 mL, Rfl: 12    MULTIVITAMIN W-MINERALS/LUTEIN (CENTRUM SILVER ORAL), Take by mouth., Disp: , Rfl:     traMADoL (ULTRAM) 50 mg tablet, Take 50 mg by mouth every 6 (six) hours as needed for Pain., Disp: , Rfl:     vitamin D 1000 units Tab, Take 1,000 Units by mouth once daily., Disp: , Rfl:     Past Medical History:   Diagnosis Date    Arthritis     Cancer     skin cancer    Cataract     Done OU    Episode of hypertension 12/28/2012    pt states she does not have, Maybe white coat syndrome    GERD (gastroesophageal reflux disease)     Glaucoma     suspect    Hyperlipidemia     Ocular hypertension     Spinal stenosis        Past Surgical History:   Procedure Laterality Date    APPENDECTOMY      CATARACT EXTRACTION W/  INTRAOCULAR LENS IMPLANT Right 07/08/2015    Dr Engel    CATARACT EXTRACTION W/  INTRAOCULAR LENS IMPLANT Left 01/16/2020    Dr Francois    COLONOSCOPY      EPIDURAL STEROID INJECTION INTO LUMBAR SPINE N/A 11/11/2020    Procedure: Injection-steroid-epidural-lumbar L5/S1;  Surgeon: Cachorro Figueroa MD;  Location: [facility] OR;  Service: Pain Management;  Laterality: N/A;    EPIDURAL STEROID INJECTION INTO LUMBAR SPINE N/A 12/20/2021    Procedure: Injection-steroid-epidural-lumbar L5/S1 to the left;  Surgeon: Cachorro Figueroa MD;  Location: [facility] OR;  Service: Pain Management;  Laterality: N/A;    INSERTION OF DORSAL COLUMN NERVE STIMULATOR FOR TRIAL Bilateral 5/11/2022    Procedure: INSERTION, NEUROSTIMULATOR, SPINAL CORD, DORSAL COLUMN,  FOR TRIAL;  Surgeon: Cachorro Figueroa MD;  Location: [facility] OR;  Service: Pain Management;  Laterality: Bilateral;    LUMBAR EPIDURAL INJECTION      MOUTH SURGERY      dental implants    PHACOEMULSIFICATION OF CATARACT Left 2020    Procedure: PHACOEMULSIFICATION, CATARACT;  Surgeon: Francis Francois Jr., MD;  Location: [facility] OR;  Service: Ophthalmology;  Laterality: Left;  Left    Yag Capsulotomy Right 10/01/2021    Dr Francois       Family History   Problem Relation Age of Onset    Diabetes Mother     Heart attack Mother     Cancer Maternal Aunt         ovarian    Heart attack Father     Arthritis Father     Heart disease Father          at age 68    Heart attack Sister     Heart attack Brother     Amblyopia Neg Hx     Blindness Neg Hx     Cataracts Neg Hx     Hypertension Neg Hx     Macular degeneration Neg Hx     Retinal detachment Neg Hx     Strabismus Neg Hx     Stroke Neg Hx     Thyroid disease Neg Hx     Glaucoma Neg Hx        Social History     Socioeconomic History    Marital status:    Occupational History    Occupation: retired   Tobacco Use    Smoking status: Never Smoker    Smokeless tobacco: Never Used   Substance and Sexual Activity    Alcohol use: Yes     Alcohol/week: 2.0 standard drinks     Types: 2 Glasses of wine per week    Drug use: No    Sexual activity: Not Currently     Social Determinants of Health     Financial Resource Strain: Low Risk     Difficulty of Paying Living Expenses: Not very hard   Food Insecurity: No Food Insecurity    Worried About Running Out of Food in the Last Year: Never true    Ran Out of Food in the Last Year: Never true   Transportation Needs: No Transportation Needs    Lack of Transportation (Medical): No    Lack of Transportation (Non-Medical): No   Physical Activity: Inactive    Days of Exercise per Week: 3 days    Minutes of Exercise per Session: 0 min   Stress: No Stress Concern Present    Feeling of Stress : Not at  "all   Social Connections: Unknown    Frequency of Communication with Friends and Family: More than three times a week    Frequency of Social Gatherings with Friends and Family: More than three times a week    Active Member of Clubs or Organizations: Yes    Attends Club or Organization Meetings: More than 4 times per year    Marital Status:    Housing Stability: Low Risk     Unable to Pay for Housing in the Last Year: No    Number of Places Lived in the Last Year: 1    Unstable Housing in the Last Year: No       Review of patient's allergies indicates:   Allergen Reactions    Cymbalta [duloxetine] Nausea And Vomiting    Polysporin [bacitracin-polymyxin b] Rash    Statins-hmg-coa reductase inhibitors      "muscles freezing and could not use them"    Codeine Nausea And Vomiting    Gamma immune glob from whey Other (See Comments)     shakes    Lidocaine Itching     Topical lidocaine    Penicillins Hives    Sulfa (sulfonamide antibiotics) Swelling    Bacitracin Rash       Review of Systems:  General ROS: negative for - fever  Psychological ROS: negative for - hostility  Hematological and Lymphatic ROS: negative for - bleeding problems  Endocrine ROS: negative for - unexpected weight changes  Respiratory ROS: no cough, shortness of breath, or wheezing  Cardiovascular ROS: no chest pain or dyspnea on exertion  Gastrointestinal ROS: no abdominal pain, change in bowel habits, or black or bloody stools  Musculoskeletal ROS: negative for - muscular weakness  Neurological ROS: negative for - bowel and bladder control changes or numbness/tingling  Dermatological ROS: negative for rash    Physical Exam:  Vitals:    05/16/22 1305   BP: (!) 181/93   Pulse: 95   Weight: 70.5 kg (155 lb 8.6 oz)   Height: 5' 6" (1.676 m)   PainSc:   6   PainLoc: Back     Body mass index is 25.1 kg/m².     Gen: NAD, pleasant, well groomed  Gait:  Presented in wheelchair  Psych:  Mood appropriate for given condition  HEENT: eyes " anicteric   GI: Abd soft  CV: RRR  Lungs: breathing unlabored   ROM: limited AROM of the L spine in all planes, full ROM at ankles, knees and hips  Lumbar flexion 90 degrees, extension 50 degrees, side bending 30 degrees.    Sensation: intact to light touch in all dermatomes tested from L2-S1 bilaterally  Reflexes: 0/0 b/l patella and 0/0 b/l achilles  Palpation:  -TTP over the b/l greater trochanters and bilateral SI joint  Tone: normal in the b/l knees and hips   Skin: intact  Extremities: No edema in b/l ankles or hands  Provacative tests:        Right Left   L2/3 Iliacus Hip flexion  5  5   L3/4 Qudratus Femoris Knee Extension  5  5   L4/5 Tib Anterior Ankle Dorsiflexion   5  5   L5/S1 Extensor Hallicus Longus Great toe extension  5  5                 S1/S2 Gastroc/Soleus Plantar Flexion  5  5       Imaging:  MRI lumbar spine 5/17/19  FINDINGS:  The conus ends at T12-L1.  No diffuse abnormal marrow or central conus signal is appreciated. No paraspinal fluid collections are appreciated. Osseous alignment appears maintained. There is multilevel facet, ligamentous hypertrophy mid to lower lumbar predominantly similar as well as maintained alignment demonstrating multilevel disc space narrowing, desiccation and marginal anterolisthesis L4-5.  There is Modic endplate degeneration at L2-3 slightly increased overall.  No interval extruded  type fragment is appreciated.     L1-2 demonstrates some broad-based concentric disc bulging, thecal sac triangulation as well as mild lateral recess, inferior neural foraminal narrowing bilaterally.  L2-3 demonstrates broad-based concentric disc bulging with thecal sac triangulation as well as moderate neural foraminal narrowing bilaterally right slightly more so than left.  L3-4 demonstrates broad-based concentric disc bulging with thecal sac triangulation, narrowing overall.  There is some annular fissuring as well as moderate left, moderate to significant right neural  foraminal narrowing with some posterior element hypertrophy, spurring.  L4-5 demonstrates broad-based concentric disc bulging with annular fissuring.  There is moderate neural foraminal narrowing demonstrated bilaterally with spinal canal narrowing overall along with exuberant posterior element, ligamentous hypertrophy.  L5-S1 demonstrates some broad-based concentric disc bulging with annular fissuring and small to moderate central protrusion.  There is significant neural foraminal narrowing demonstrated on the left as well as moderate right.    MRI lumbar spine 12/5/21  FINDINGS:  NOMENCLATURE: Five lumbar type vertebral bodies.     CORD/CAUDA EQUINA: Conus has normal size and signal and ends at a normal level of L1-L2.     ALIGNMENT: Trace retrolisthesis of L1 on L2, L2 on L3 and L3 on L4.  4 mm grade 1 anterolisthesis of L4 on L5.  levoscoliosis with a Segundo angle of 18.1 degrees using the superior T12 and inferior L5 endplates (series 8, image 9).     BONES: Vertebral body heights are maintained.  Multilevel endplate changes, greatest at L2-3 and L5-S1 where there are type 1 endplate changes.  Prominent type 2 endplate changes on the right at L3-4.  Hemangioma in the left T12 pedicle and S1 vertebral body.  No aggressive bone marrow signal.     PARASPINAL AREA: Normal.     LUMBAR DISC LEVELS:     T12-L1: No disc herniation or significant posterior osteophytic ridging.  Ligamentum flavum thickening.  No significant spinal canal or foraminal stenosis.  L1-L2: Trace retrolisthesis.  Mild disc bulge.  Disc height loss.  Ligamentum flavum thickening.  Mild narrowing of the bilateral lateral recesses.  No significant spinal canal stenosis.  Mild bilateral foraminal stenosis.  L2-L3: Trace retrolisthesis.  Mild disc bulge.  Disc height loss.  Ligamentum flavum thickening.  Mild bilateral facet hypertrophy.  Mild right greater than left narrowing of the lateral recesses, minimally increased from prior study.  No  significant spinal canal stenosis.  Mild-moderate right and mild left foraminal stenosis.  L3-L4: Trace retrolisthesis.  Mild disc bulge.  Disc height loss.  Bilateral facet hypertrophy.  Ligamentum flavum thickening.  Moderate right and mild-moderate left narrowing of the lateral recesses.  Mild spinal canal stenosis, minimally decreased from prior study.  Mild-moderate right and mild left foraminal stenosis.  This appears less pronounced on the right compared to prior study.  L4-L5: Anterolisthesis.  Unroofing of the disc and mild disc bulge.  Moderate narrowing of the bilateral lateral recesses.  Severe spinal canal stenosis, unchanged.  Mild bilateral foraminal stenosis, unchanged.  L5-S1: Mild disc bulge, eccentric to the left.  Moderate-marked left and mild right facet hypertrophy.  Ligamentum flavum thickening, greater on the left.  Moderate left and minimal right narrowing of the lateral recesses.  No significant spinal canal stenosis.  Moderate-severe left and minimal right foraminal stenosis.  This is progressed on the left compared to prior study.    Labs:  BMP  Lab Results   Component Value Date     02/16/2022    K 4.8 02/16/2022     02/16/2022    CO2 27 02/16/2022    BUN 16 02/16/2022    CREATININE 0.8 02/16/2022    CALCIUM 9.9 02/16/2022    ANIONGAP 10 02/16/2022    ESTGFRAFRICA >60.0 02/16/2022    EGFRNONAA >60.0 02/16/2022     Lab Results   Component Value Date    ALT 15 02/16/2022    AST 17 02/16/2022    ALKPHOS 66 02/16/2022    BILITOT 0.4 02/16/2022     Lab Results   Component Value Date    WBC 7.50 01/11/2022    HGB 13.0 01/11/2022    HCT 40.3 01/11/2022    MCV 93 01/11/2022     01/11/2022       Assessment:   Problem List Items Addressed This Visit        Neuro    Lumbar radiculopathy      Other Visit Diagnoses     Chronic pain syndrome    -  Primary        87 y.o. year old female presents to the office with back pain.  She's had chronic lower back pain for the past 8 years  that has been gradually worsening.      5/16/22: Libby Estrada returns to the office for follow up.  She is status post SCS trial on 05/11/2022.  She reports nearly 100% improvement in her mobility as she reports she is able to stand up straight while ambulating and she reports he had 85% relief of her back pain and her leg pain.  Overall she is extremely satisfied with the trial.    -on exam today she has full strength and intact sensation light touch.   - I independently reviewed her updated lumbar MRI and it is c/w L4-5 severe spinal canal stenosis, unchanged from 2019 and L5-S1 moderate left and minimal right narrowing of the lateral recesses, moderate-severe left and minimal right foraminal stenosis that has progressed on the left compared to prior study.  - prior to the SCS trial we discussed seeing a neurosurgeon however she has seen Dr. Cristina the years ago and surgery was not recommended at the time.  - she is now status post SCS trial and is very happy with the improvement in her pain and quality of life and mobility.  She wishes to proceed with SCS implant  - continue gabapentin as prescribed.  Continue tramadol p.r.n. for severe pain as prescribed  - we will schedule for SCS implant. The risks and benefits of this intervention, and alternative therapies were discussed with the patient.  The discussion of risks included infection, bleeding, need for additional procedures or surgery, nerve damage.  Questions regarding the procedure, risks, expected outcome, and possible side effects were solicited and answered to the patient's satisfaction.  Libby Estrada wishes to proceed with the injection or procedure.  Written consent was obtained.  - follow-up 1 week post implant      :  Reviewed    This note was completed with dictation software and grammatical errors may exist.      Lead Removal Procedure  Dressing removed to expose insertion sites in low back.  Inspection of the lead insertion site reveal  mild erythema around the leads without drainage.  Silk sutures anchoring the leads are intact.    Silk suture was cut with scissors and removed intact with pain or discomfort.  Leads were with drawn using gentle, continuous traction.  Inspection of the leads demonstrated they were removed intact with fracturing or breaking.  The skin was cleaned with chlorhexidine and an adhesive bandage applied.

## 2022-05-27 ENCOUNTER — ANESTHESIA EVENT (OUTPATIENT)
Dept: SURGERY | Facility: HOSPITAL | Age: 87
End: 2022-05-27
Payer: MEDICARE

## 2022-05-30 ENCOUNTER — HOSPITAL ENCOUNTER (OUTPATIENT)
Facility: HOSPITAL | Age: 87
Discharge: HOME | End: 2022-05-30
Attending: ANESTHESIOLOGY | Admitting: ANESTHESIOLOGY
Payer: MEDICARE

## 2022-05-30 ENCOUNTER — HOSPITAL ENCOUNTER (OUTPATIENT)
Dept: RADIOLOGY | Facility: HOSPITAL | Age: 87
Discharge: HOME | End: 2022-05-30
Attending: ANESTHESIOLOGY | Admitting: ANESTHESIOLOGY
Payer: MEDICARE

## 2022-05-30 ENCOUNTER — ANESTHESIA (OUTPATIENT)
Dept: SURGERY | Facility: HOSPITAL | Age: 87
End: 2022-05-30
Payer: MEDICARE

## 2022-05-30 DIAGNOSIS — M47.816 LUMBAR SPONDYLOSIS: ICD-10-CM

## 2022-05-30 DIAGNOSIS — M54.50 LOWER BACK PAIN: ICD-10-CM

## 2022-05-30 DIAGNOSIS — M54.16 LUMBAR RADICULOPATHY: ICD-10-CM

## 2022-05-30 DIAGNOSIS — G89.4 CHRONIC PAIN SYNDROME: Primary | ICD-10-CM

## 2022-05-30 DIAGNOSIS — M48.062 SPINAL STENOSIS OF LUMBAR REGION WITH NEUROGENIC CLAUDICATION: ICD-10-CM

## 2022-05-30 PROCEDURE — 25000003 PHARM REV CODE 250: Mod: PO | Performed by: NURSE ANESTHETIST, CERTIFIED REGISTERED

## 2022-05-30 PROCEDURE — 25000003 PHARM REV CODE 250: Mod: PO | Performed by: ANESTHESIOLOGY

## 2022-05-30 PROCEDURE — 37000009 HC ANESTHESIA EA ADD 15 MINS: Mod: PO | Performed by: ANESTHESIOLOGY

## 2022-05-30 PROCEDURE — 36000706: Mod: PO | Performed by: ANESTHESIOLOGY

## 2022-05-30 PROCEDURE — 63650 IMPLANT NEUROELECTRODES: CPT | Mod: ,,, | Performed by: ANESTHESIOLOGY

## 2022-05-30 PROCEDURE — 63600175 PHARM REV CODE 636 W HCPCS: Mod: PO | Performed by: NURSE ANESTHETIST, CERTIFIED REGISTERED

## 2022-05-30 PROCEDURE — 63650 PR PERCUT IMPLNT NEUROELECT,EPIDURAL: ICD-10-PCS | Mod: ,,, | Performed by: ANESTHESIOLOGY

## 2022-05-30 PROCEDURE — 63685 INS/RPLC SPI NPG/RCVR POCKET: CPT | Mod: 59,,, | Performed by: ANESTHESIOLOGY

## 2022-05-30 PROCEDURE — C1778 LEAD, NEUROSTIMULATOR: HCPCS | Mod: PO | Performed by: ANESTHESIOLOGY

## 2022-05-30 PROCEDURE — 37000008 HC ANESTHESIA 1ST 15 MINUTES: Mod: PO | Performed by: ANESTHESIOLOGY

## 2022-05-30 PROCEDURE — C1767 GENERATOR, NEURO NON-RECHARG: HCPCS | Mod: PO | Performed by: ANESTHESIOLOGY

## 2022-05-30 PROCEDURE — D9220A PRA ANESTHESIA: ICD-10-PCS | Mod: CRNA,,, | Performed by: NURSE ANESTHETIST, CERTIFIED REGISTERED

## 2022-05-30 PROCEDURE — D9220A PRA ANESTHESIA: Mod: ANES,,, | Performed by: ANESTHESIOLOGY

## 2022-05-30 PROCEDURE — 76000 FLUOROSCOPY <1 HR PHYS/QHP: CPT | Mod: TC,PO

## 2022-05-30 PROCEDURE — 63685 PR IMPLANT SPINAL NEUROSTIM/RECEIVER: ICD-10-PCS | Mod: 59,,, | Performed by: ANESTHESIOLOGY

## 2022-05-30 PROCEDURE — C1713 ANCHOR/SCREW BN/BN,TIS/BN: HCPCS | Mod: PO | Performed by: ANESTHESIOLOGY

## 2022-05-30 PROCEDURE — D9220A PRA ANESTHESIA: Mod: CRNA,,, | Performed by: NURSE ANESTHETIST, CERTIFIED REGISTERED

## 2022-05-30 PROCEDURE — D9220A PRA ANESTHESIA: ICD-10-PCS | Mod: ANES,,, | Performed by: ANESTHESIOLOGY

## 2022-05-30 PROCEDURE — 63600175 PHARM REV CODE 636 W HCPCS: Mod: PO | Performed by: ANESTHESIOLOGY

## 2022-05-30 PROCEDURE — 71000033 HC RECOVERY, INTIAL HOUR: Mod: PO | Performed by: ANESTHESIOLOGY

## 2022-05-30 PROCEDURE — 71000015 HC POSTOP RECOV 1ST HR: Mod: PO | Performed by: ANESTHESIOLOGY

## 2022-05-30 PROCEDURE — 36000707: Mod: PO | Performed by: ANESTHESIOLOGY

## 2022-05-30 DEVICE — ANCHOR LEAD NEUROSTIMULATION: Type: IMPLANTABLE DEVICE | Site: BACK | Status: FUNCTIONAL

## 2022-05-30 DEVICE — LEAD OCTRODE 4MM SPACING 60CM: Type: IMPLANTABLE DEVICE | Site: BACK | Status: FUNCTIONAL

## 2022-05-30 DEVICE — PROCLAIM ELITE 5 PATIENT CTRL: Type: IMPLANTABLE DEVICE | Site: BACK | Status: FUNCTIONAL

## 2022-05-30 RX ORDER — FENTANYL CITRATE 50 UG/ML
INJECTION, SOLUTION INTRAMUSCULAR; INTRAVENOUS
Status: DISCONTINUED | OUTPATIENT
Start: 2022-05-30 | End: 2022-05-30

## 2022-05-30 RX ORDER — PROPOFOL 10 MG/ML
VIAL (ML) INTRAVENOUS
Status: DISCONTINUED | OUTPATIENT
Start: 2022-05-30 | End: 2022-05-30

## 2022-05-30 RX ORDER — SODIUM CHLORIDE, SODIUM LACTATE, POTASSIUM CHLORIDE, CALCIUM CHLORIDE 600; 310; 30; 20 MG/100ML; MG/100ML; MG/100ML; MG/100ML
INJECTION, SOLUTION INTRAVENOUS CONTINUOUS
Status: DISCONTINUED | OUTPATIENT
Start: 2022-05-30 | End: 2022-05-30 | Stop reason: HOSPADM

## 2022-05-30 RX ORDER — PROPOFOL 10 MG/ML
VIAL (ML) INTRAVENOUS CONTINUOUS PRN
Status: DISCONTINUED | OUTPATIENT
Start: 2022-05-30 | End: 2022-05-30

## 2022-05-30 RX ORDER — SODIUM CHLORIDE 0.9 % (FLUSH) 0.9 %
10 SYRINGE (ML) INJECTION
Status: DISCONTINUED | OUTPATIENT
Start: 2022-05-30 | End: 2022-05-30 | Stop reason: HOSPADM

## 2022-05-30 RX ORDER — OXYCODONE AND ACETAMINOPHEN 5; 325 MG/1; MG/1
1 TABLET ORAL EVERY 6 HOURS PRN
Qty: 15 TABLET | Refills: 0 | Status: SHIPPED | OUTPATIENT
Start: 2022-05-30 | End: 2022-08-16

## 2022-05-30 RX ORDER — METOCLOPRAMIDE HYDROCHLORIDE 5 MG/ML
10 INJECTION INTRAMUSCULAR; INTRAVENOUS EVERY 10 MIN PRN
Status: DISCONTINUED | OUTPATIENT
Start: 2022-05-30 | End: 2022-05-30 | Stop reason: HOSPADM

## 2022-05-30 RX ORDER — OXYCODONE HYDROCHLORIDE 5 MG/1
5 TABLET ORAL
Status: DISCONTINUED | OUTPATIENT
Start: 2022-05-30 | End: 2022-05-30 | Stop reason: HOSPADM

## 2022-05-30 RX ORDER — LIDOCAINE HYDROCHLORIDE 10 MG/ML
INJECTION, SOLUTION EPIDURAL; INFILTRATION; INTRACAUDAL; PERINEURAL
Status: DISCONTINUED | OUTPATIENT
Start: 2022-05-30 | End: 2022-05-30 | Stop reason: HOSPADM

## 2022-05-30 RX ORDER — LIDOCAINE HYDROCHLORIDE 20 MG/ML
INJECTION INTRAVENOUS
Status: DISCONTINUED | OUTPATIENT
Start: 2022-05-30 | End: 2022-05-30

## 2022-05-30 RX ORDER — CLINDAMYCIN PHOSPHATE 900 MG/50ML
900 INJECTION, SOLUTION INTRAVENOUS
Status: COMPLETED | OUTPATIENT
Start: 2022-05-30 | End: 2022-05-30

## 2022-05-30 RX ORDER — FENTANYL CITRATE 50 UG/ML
25 INJECTION, SOLUTION INTRAMUSCULAR; INTRAVENOUS EVERY 5 MIN PRN
Status: DISCONTINUED | OUTPATIENT
Start: 2022-05-30 | End: 2022-05-30 | Stop reason: HOSPADM

## 2022-05-30 RX ADMIN — FENTANYL CITRATE 25 MCG: 50 INJECTION, SOLUTION INTRAMUSCULAR; INTRAVENOUS at 08:05

## 2022-05-30 RX ADMIN — OXYCODONE 5 MG: 5 TABLET ORAL at 10:05

## 2022-05-30 RX ADMIN — FENTANYL CITRATE 25 MCG: 50 INJECTION, SOLUTION INTRAMUSCULAR; INTRAVENOUS at 09:05

## 2022-05-30 RX ADMIN — SODIUM CHLORIDE, SODIUM LACTATE, POTASSIUM CHLORIDE, AND CALCIUM CHLORIDE: .6; .31; .03; .02 INJECTION, SOLUTION INTRAVENOUS at 07:05

## 2022-05-30 RX ADMIN — LIDOCAINE HYDROCHLORIDE 75 MG: 20 INJECTION, SOLUTION INTRAVENOUS at 08:05

## 2022-05-30 RX ADMIN — FENTANYL CITRATE 50 MCG: 50 INJECTION, SOLUTION INTRAMUSCULAR; INTRAVENOUS at 08:05

## 2022-05-30 RX ADMIN — PROPOFOL 50 MCG/KG/MIN: 10 INJECTION, EMULSION INTRAVENOUS at 08:05

## 2022-05-30 RX ADMIN — PROPOFOL 90 MG: 10 INJECTION, EMULSION INTRAVENOUS at 08:05

## 2022-05-30 RX ADMIN — TACROLIMUS 900 MG: 0.5 CAPSULE ORAL at 07:05

## 2022-05-30 NOTE — ANESTHESIA POSTPROCEDURE EVALUATION
Anesthesia Post Evaluation    Patient: Libby Estrada    Procedure(s) Performed: Procedure(s) (LRB):  INSERTION, NEUROSTIMULATOR, SPINAL (N/A)    Final Anesthesia Type: MAC      Patient location during evaluation: PACU  Patient participation: Yes- Able to Participate  Level of consciousness: awake and alert and oriented  Post-procedure vital signs: reviewed and stable  Pain management: adequate  Airway patency: patent    PONV status at discharge: No PONV  Anesthetic complications: no      Cardiovascular status: blood pressure returned to baseline  Respiratory status: unassisted, spontaneous ventilation and room air  Hydration status: euvolemic  Follow-up not needed.          Vitals Value Taken Time   /75 05/30/22 1015   Temp  05/30/22 1021   Pulse 95 05/30/22 1015   Resp 12 05/30/22 1015   SpO2 98 % 05/30/22 1015         No case tracking events are documented in the log.      Pain/Jem Score: Jem Score: 9 (5/30/2022 10:15 AM)

## 2022-05-30 NOTE — PLAN OF CARE
BP elevated. all questions answered. Denies recent fever or illness. Pt states ready for procedure.

## 2022-05-30 NOTE — TRANSFER OF CARE
"Anesthesia Transfer of Care Note    Patient: Libby Estrada    Procedure(s) Performed: Procedure(s) (LRB):  INSERTION, NEUROSTIMULATOR, SPINAL (N/A)    Patient location: PACU    Anesthesia Type: MAC    Transport from OR: Transported from OR on room air with adequate spontaneous ventilation    Post pain: adequate analgesia    Post assessment: no apparent anesthetic complications    Post vital signs: stable    Level of consciousness: awake    Nausea/Vomiting: no nausea/vomiting    Complications: none    Transfer of care protocol was followed      Last vitals:   Visit Vitals  BP (!) 183/93 (BP Location: Right arm, Patient Position: Lying)   Pulse 98   Temp 36.5 °C (97.7 °F) (Skin)   Resp 16   Ht 5' 5" (1.651 m)   Wt 68 kg (150 lb)   LMP  (LMP Unknown)   SpO2 96%   Breastfeeding No   BMI 24.96 kg/m²     "

## 2022-05-30 NOTE — OP NOTE
PROCEDURE DATE: 5/30/2022    Spinal Cord Stimulator Implantation  PROCEDURE:   1. Spinal Cord Stimulator leads placement x 2 and implant- 16 contact total  2. Pulse Generator Implantation under flouroscopy  3. Programming greater than 30 mins    Pre-op diagnosis: 1) Chronic pain syndrome 2) lumbar radiculopathy    Post-op diagnosis: Same    Surgeon: Dr. Cachorro Figueroa     Anesthesia: Monitored Anesthesia Care    Estimated Blood Loss: Minimal- <10cc    Urine Output: Not Measured    IV Fluids- See Anesthesia record    Complications: none    CONSENT: The risks, benefits, and options were discussed thoroughly with the patient. The patient's questions were answered. The patient understands the risk and benefits and wishes to proceed. Informed consent was obtained.     PROCEDURE NOTE:  Patient is s/p a successful trial of spinal cord stimulation and scheduled for a stage 2 implant. After obtaining informed consent, pre-op antibiotic was started 30 minutes prior to incision. Site of the implantable pulse generator was marked on the patients left side in the preoperative area. The patient was taken to the OR and placed in the prone position. The anesthesia provider throughout the case provided sedation and cardiopulmonary monitoring. The Patient's back was prepped with Duraprep and then draped in usual sterile fashion.     An AP fluoroscopic view was obtained to identify and maame the midline position of the spinous process. The skin was anesthetized with Lidocaine 1%. Skin incision with a No. 10 scalpel was made and local dissection done with electrocautery as necessary to facilitate access to the paraspinous fascia.     Using a paramedian approach, a Tuohy needle was entered into the left-side L1-2 epidural space using a loss of resistance technique with 0.5cc of air. A similar approach was done on the right-side. Then, after negative aspiration of blood, CSF, or any paraesthesia, the SCS leads were advanced to the top of the  T7 vertebral body in the midline. Stimulation was carried and patient reported coverage of pain areas.     Then, the Tuohy needles were removed under fluoroscopy and a lead anchor placed over the leads. Using 0-0 Orthocord sutures, leads were anchored to the fascia with 2 sutures for each lead. A relief loop was then placed. Then further local anesthetic was used at the IPG site with 1% lidocaine. Using a No. 10 scalpel, an incision was made over the left buttock and dissection carried out with Bovie and blunt dissection. After obtaining hemostasis, both the incisions were irrigated with antibiotic solutions. Using the tunneling tool, tunneling was completed between the midline and the IPG pocket. The leads were passed to the IPG and connected. Then the IPG was placed in the IPG pocket and system was checked and noted to be in adequate position. Copious antibiotic bulb lavage was irrigated throughout the field, and hemostasis was maintained.     Then the wound was closed with simple interrupted sutures using 0-0 vicryl sutures and then 2-0 vicryl sutures in 2 layers and the skin closed with staples. Bacitracin was placed over the incision sites and dressings applied. Sponge and needle counts were correct x2 at conclusion of the case.     Patient was then placed supine on the stretcher and transferred to the recovery room. Patient was programmed by the representative of the SCS. Patient was instructed not to perform any abrupt movements with the back, avoid bending, twisting, or lifting >10lbs. The patient has been scheduled to return to the clinic in approx 7 days. The patient tolerated the procedure well.

## 2022-05-30 NOTE — PLAN OF CARE
Patient tolerating oral liquids without difficulty. No apparent s&s of distress noted at this time. Dressings intact to back. Pain tolerable after pain meds given.  Discharge instructions reviewed with patient/family/friend with good verbal feedback received. Patient ready for discharge

## 2022-05-30 NOTE — DISCHARGE INSTRUCTIONS
SPINAL CORD STIMULATOR    Sponge bath only until follow up with the doctor.  No bending, lifting or twisting.   Do not make any movements that cause bandages to pull.  Do not lift your elbows higher than your shoulders.  Do not remove dressings.  No strenuous activities.  Follow up with your physician in one week.  Call your doctor for excessive bleeding or swelling.  Rep will call you tomorrow. Phone number is on the box.    Call [phone] for doctor.

## 2022-05-30 NOTE — ANESTHESIA PREPROCEDURE EVALUATION
05/30/2022  Libby Estrada is a 87 y.o., female.    Pre-op Assessment    I have reviewed the Patient Summary Reports.    I have reviewed the Nursing Notes. I have reviewed the NPO Status.   I have reviewed the Medications.     Review of Systems  EENT/Dental:   Recent PHACO's   Cardiovascular:   Exercise tolerance: good Hypertension, well controlled    Pulmonary:  Pulmonary Normal    Hepatic/GI:   GERD    Musculoskeletal:   Spinal stenosis, pain Spine Disorders: lumbar    Neurological:   Neuromuscular Disease,    Psych:  Psychiatric Normal           Physical Exam  General:  Well nourished      Airway/Jaw/Neck:  Airway Findings: Mouth Opening: Normal   Tongue: Normal   General Airway Assessment: Adult Oropharynx Findings: Normal Mallampati: III  Improves to II with phonation.  TM Distance: 4 - 6 cm   Neck Findings: Normal     Dental:  Dental Findings: In tact     Chest/Lungs:  Chest/Lungs Findings: Clear to auscultation, Normal Respiratory Rate      Heart/Vascular:  Heart Findings: Rate: Normal  Rhythm: Regular Rhythm  Sounds: Normal  Heart murmur: negative    Abdomen:  Abdomen Findings: Normal    Musculoskeletal:  Musculoskeletal Findings: Normal   Skin:  Skin Findings: Normal    Mental Status:  Mental Status Findings:  Cooperative, Alert and Oriented         Anesthesia Plan  Type of Anesthesia, risks & benefits discussed:  Anesthesia Type:  MAC    Patient's Preference:   Plan Factors:          Intra-op Monitoring Plan: standard ASA monitors  Intra-op Monitoring Plan Comments:   Post Op Pain Control Plan: multimodal analgesia and IV/PO Opioids PRN  Post Op Pain Control Plan Comments:     Induction:   IV  Beta Blocker:  Patient is not currently on a Beta-Blocker (No further documentation required).       Informed Consent: Informed consent signed with the Patient and all parties understand the risks and agree  with anesthesia plan.  All questions answered.  Anesthesia consent signed with patient.  ASA Score: 3     Day of Surgery Review of History & Physical: I have interviewed and examined the patient. I have reviewed the patient's H&P dated:  There are no significant changes.            Ready For Surgery From Anesthesia Perspective.           Physical Exam  General: Well nourished    Airway:  Mallampati: III / II  Mouth Opening: Normal  TM Distance: 4 - 6 cm  Tongue: Normal    Dental:  In tact    Chest/Lungs:  Clear to auscultation, Normal Respiratory Rate    Heart:  Rate: Normal  Rhythm: Regular Rhythm  Sounds: Normal          Anesthesia Plan  Type of Anesthesia, risks & benefits discussed:    Anesthesia Type: MAC  Intra-op Monitoring Plan: standard ASA monitors  Post Op Pain Control Plan: multimodal analgesia and IV/PO Opioids PRN  Induction:  IV  Informed Consent: Informed consent signed with the Patient and all parties understand the risks and agree with anesthesia plan.  All questions answered.   ASA Score: 3  Day of Surgery Review of History & Physical: I have interviewed and examined the patient. I have reviewed the patient's H&P dated:     Ready For Surgery From Anesthesia Perspective.       .

## 2022-05-30 NOTE — DISCHARGE SUMMARY
[facility]  Discharge Note  Short Stay    Admit Date: 5/30/2022    Discharge Date: 5/30/2022    Attending Physician: Cachorro Figueroa     Discharge Provider: Cachorro Figueroa    Diagnoses:  There are no hospital problems to display for this patient.      Discharged Condition: Good    Final Diagnoses: Lumbar radiculopathy [M54.16]  Chronic pain syndrome [G89.4]  Spinal stenosis of lumbar region with neurogenic claudication [M48.062]  Lumbar spondylosis [M47.816]    Disposition: Home or Self Care    Hospital Course: No complications, uneventful    Outcome of Hospitalization, Treatment, Procedure, or Surgery:  Patient was admitted for outpatient interventional pain management procedure. The patient tolerated the procedure well with no complications.    Follow up/Patient Instructions:  Follow up as scheduled in Pain Management office in 2-3 weeks.  Patient has received instructions and follow up date and time.    Medications:  Continue previous medications    Discharge Procedure Orders   Notify your health care provider if you experience any of the following:  temperature >100.4     Notify your health care provider if you experience any of the following:  persistent nausea and vomiting or diarrhea     Notify your health care provider if you experience any of the following:  severe uncontrolled pain     Notify your health care provider if you experience any of the following:  redness, tenderness, or signs of infection (pain, swelling, redness, odor or green/yellow discharge around incision site)     Notify your health care provider if you experience any of the following:  difficulty breathing or increased cough     Notify your health care provider if you experience any of the following:  severe persistent headache     Notify your health care provider if you experience any of the following:  worsening rash     Notify your health care provider if you experience any of the following:  persistent dizziness,  light-headedness, or visual disturbances     Notify your health care provider if you experience any of the following:  increased confusion or weakness     Activity as tolerated

## 2022-05-31 ENCOUNTER — TELEPHONE (OUTPATIENT)
Dept: ENDOSCOPY | Facility: HOSPITAL | Age: 87
End: 2022-05-31
Payer: MEDICARE

## 2022-05-31 VITALS
HEIGHT: 65 IN | TEMPERATURE: 97 F | DIASTOLIC BLOOD PRESSURE: 86 MMHG | RESPIRATION RATE: 13 BRPM | SYSTOLIC BLOOD PRESSURE: 183 MMHG | OXYGEN SATURATION: 98 % | BODY MASS INDEX: 24.99 KG/M2 | HEART RATE: 94 BPM | WEIGHT: 150 LBS

## 2022-05-31 NOTE — TELEPHONE ENCOUNTER
"Hello, I did this patients follow up call and the patient is C/O "burning and pulling/shearing" around the battery site. She stated she had a low grade temp and she is not sure if any of this is normal post op issues. She would like someone to reach out to her regarding this issue  "

## 2022-06-06 ENCOUNTER — OFFICE VISIT (OUTPATIENT)
Dept: PAIN MEDICINE | Facility: CLINIC | Age: 87
End: 2022-06-06
Payer: MEDICARE

## 2022-06-06 VITALS
DIASTOLIC BLOOD PRESSURE: 79 MMHG | OXYGEN SATURATION: 95 % | HEIGHT: 65 IN | BODY MASS INDEX: 25.78 KG/M2 | HEART RATE: 91 BPM | SYSTOLIC BLOOD PRESSURE: 198 MMHG | WEIGHT: 154.75 LBS

## 2022-06-06 DIAGNOSIS — G89.4 CHRONIC PAIN SYNDROME: Primary | ICD-10-CM

## 2022-06-06 DIAGNOSIS — M54.16 LUMBAR RADICULOPATHY: ICD-10-CM

## 2022-06-06 PROCEDURE — 99213 OFFICE O/P EST LOW 20 MIN: CPT | Mod: PBBFAC,PN | Performed by: ANESTHESIOLOGY

## 2022-06-06 PROCEDURE — 99024 PR POST-OP FOLLOW-UP VISIT: ICD-10-PCS | Mod: POP,,, | Performed by: ANESTHESIOLOGY

## 2022-06-06 PROCEDURE — 99999 PR PBB SHADOW E&M-EST. PATIENT-LVL III: ICD-10-PCS | Mod: PBBFAC,,, | Performed by: ANESTHESIOLOGY

## 2022-06-06 PROCEDURE — 99024 POSTOP FOLLOW-UP VISIT: CPT | Mod: POP,,, | Performed by: ANESTHESIOLOGY

## 2022-06-06 PROCEDURE — 99999 PR PBB SHADOW E&M-EST. PATIENT-LVL III: CPT | Mod: PBBFAC,,, | Performed by: ANESTHESIOLOGY

## 2022-06-06 RX ORDER — DOXYCYCLINE 100 MG/1
100 CAPSULE ORAL 2 TIMES DAILY
Qty: 20 CAPSULE | Refills: 0 | Status: SHIPPED | OUTPATIENT
Start: 2022-06-06 | End: 2022-06-16

## 2022-06-06 NOTE — PROGRESS NOTES
[facility] Follow Up Evaluation    Referred by: Cristine Hidalgo PA-C  Reason for referral: back pain    CC:   Chief Complaint   Patient presents with    Follow-up      Last 3 PDI Scores 5/16/2022 4/26/2022 3/24/2022   Pain Disability Index (PDI) 42 31 70       Interval HPI 6/6/2022: Libby Estrada returns to the office for follow up.  She is status post SCS implant on 5/30/22.  Today she reports similar relief to her SCS.  She reports she can stand up straight with an improvement in her mobility.  She also reports some pain down her right leg but that with reprogramming earlier today she has similar relief, about 80%, of her right sided radicular pain.    She reports she had temp to 99 degress on post op day 3, but since then no fever/chills.  Denies any new numbness or weakness.    Pain Intervention History:    - s/p L5/S1 LENY on 11/11/20 with about 50% relief of her low back pain.  - s/p L5/S1 LENY on 12/20/21 with 50% relief.  - s/p SCS trial on 05/11/2022 with nearly 100% improvement in her mobility as she reports she is able to stand up straight while ambulating and 85% relief of her back pain and her leg pain    HPI:   Libby Estrada is a 87 y.o. female who complains of back pain    Onset: about 8 years  Inciting Event: none  Progression: since onset, pain is gradually worsening   Current Pain Score: 10/10  Typical Range: 6-10/10  Timing: constant  Quality: burning, sharp, stabbing   Radiation: yes, down the back of both legs L>R  Associated numbness or weakness: no numbness, no weakness   Exacerbated by: standing, walking, bending  Allievated by: rest, sitting, medications, laying down.   Is Pain Level Acceptable?: No    Previous Therapies:  PT/OT: yes, in the past  HEP:   Interventions:   Surgery:  Medications:   - NSAIDS:   - MSK Relaxants: baclofen, Robaxin  - TCAs:   - SNRIs:  Cymbalta- did not tolerate  - Topicals:   - Anticonvulsants: gabapentin  - Opioids:  Tramadol    History:    Current  Outpatient Medications:     acetaminophen (TYLENOL ARTHRITIS PAIN ORAL), Take by mouth., Disp: , Rfl:     COVID-19 vacc,mRNA,Moderna,/PF (MODERNA COVID-19 VACCINE, EUA, IM), PHARMACY ADMINISTERED, Disp: , Rfl:     dorzolamide-timolol 2-0.5% (COSOPT) 22.3-6.8 mg/mL ophthalmic solution, USE ONE DROP IN EACH EYE TWICE DAILY. (50 DAY SUPPLY PER 10ML), Disp: 30 mL, Rfl: 3    ezetimibe (ZETIA) 10 mg tablet, TAKE ONE TABLET BY MOUTH ONCE DAILY, Disp: 90 tablet, Rfl: 3    gabapentin (NEURONTIN) 300 MG capsule, Take 1 capsule (300 mg total) by mouth 3 (three) times daily., Disp: 90 capsule, Rfl: 2    latanoprost 0.005 % ophthalmic solution, PLACE ONE DROP IN EACH EYE EVERY EVENING, Disp: 2.5 mL, Rfl: 12    MULTIVITAMIN W-MINERALS/LUTEIN (CENTRUM SILVER ORAL), Take by mouth., Disp: , Rfl:     oxyCODONE-acetaminophen (PERCOCET) 5-325 mg per tablet, Take 1 tablet by mouth every 6 (six) hours as needed for Pain., Disp: 15 tablet, Rfl: 0    traMADoL (ULTRAM) 50 mg tablet, Take 1 tablet (50 mg total) by mouth 3 (three) times daily as needed for Pain., Disp: 21 tablet, Rfl: 0    vitamin D 1000 units Tab, Take 1,000 Units by mouth once daily., Disp: , Rfl:     baclofen (LIORESAL) 10 MG tablet, Take 1 tablet (10 mg total) by mouth 3 (three) times daily., Disp: 60 tablet, Rfl: 01    doxycycline (MONODOX) 100 MG capsule, Take 1 capsule (100 mg total) by mouth 2 (two) times daily. for 10 days, Disp: 20 capsule, Rfl: 0    Past Medical History:   Diagnosis Date    Arthritis     Cancer     skin cancer    Cataract     Done OU    Episode of hypertension 12/28/2012    pt states she does not have, Maybe white coat syndrome    GERD (gastroesophageal reflux disease)     Glaucoma     suspect    Hyperlipidemia     Ocular hypertension     Spinal stenosis        Past Surgical History:   Procedure Laterality Date    APPENDECTOMY      CATARACT EXTRACTION W/  INTRAOCULAR LENS IMPLANT Right 07/08/2015    Dr Engel    CATARACT  EXTRACTION W/  INTRAOCULAR LENS IMPLANT Left 2020    Dr Francois    [facility]      EPIDURAL STEROID INJECTION INTO LUMBAR SPINE N/A 2020    Procedure: Injection-steroid-epidural-lumbar L5/S1;  Surgeon: Cachorro Figueroa MD;  Location: [facility] OR;  Service: Pain Management;  Laterality: N/A;    EPIDURAL STEROID INJECTION INTO LUMBAR SPINE N/A 2021    Procedure: Injection-steroid-epidural-lumbar L5/S1 to the left;  Surgeon: Cachorro Figueroa MD;  Location: [facility] OR;  Service: Pain Management;  Laterality: N/A;    INSERTION OF DORSAL COLUMN NERVE STIMULATOR FOR TRIAL Bilateral 2022    Procedure: INSERTION, NEUROSTIMULATOR, SPINAL CORD, DORSAL COLUMN, FOR TRIAL;  Surgeon: Cachorro Figueroa MD;  Location: [facility] OR;  Service: Pain Management;  Laterality: Bilateral;    INSERTION OF SPINAL NEUROSTIMULATOR N/A 2022    Procedure: INSERTION, NEUROSTIMULATOR, SPINAL;  Surgeon: Cachorro Figueroa MD;  Location: [facility] OR;  Service: Pain Management;  Laterality: N/A;    LUMBAR EPIDURAL INJECTION      MOUTH SURGERY      dental implants    PHACOEMULSIFICATION OF CATARACT Left 2020    Procedure: PHACOEMULSIFICATION, CATARACT;  Surgeon: Francis Francois Jr., MD;  Location: [facility] OR;  Service: Ophthalmology;  Laterality: Left;  Left    Yag Capsulotomy Right 10/01/2021    Dr Francois       Family History   Problem Relation Age of Onset    Diabetes Mother     Heart attack Mother     Cancer Maternal Aunt         ovarian    Heart attack Father     Arthritis Father     Heart disease Father          at age 68    Heart attack Sister     Heart attack Brother     Amblyopia Neg Hx     Blindness Neg Hx     Cataracts Neg Hx     Hypertension Neg Hx     Macular degeneration Neg Hx     Retinal detachment Neg Hx     Strabismus Neg Hx     Stroke Neg Hx     Thyroid disease Neg Hx     Glaucoma Neg Hx        Social History     Socioeconomic History    Marital status:    Occupational History     "Occupation: retired   Tobacco Use    Smoking status: Never Smoker    Smokeless tobacco: Never Used   Substance and Sexual Activity    Alcohol use: Yes     Alcohol/week: 2.0 standard drinks     Types: 2 Glasses of wine per week    Drug use: No    Sexual activity: Not Currently     Social Determinants of Health     Financial Resource Strain: Unknown    Difficulty of Paying Living Expenses: Patient refused   Food Insecurity: Unknown    Worried About Running Out of Food in the Last Year: Never true    Ran Out of Food in the Last Year: Patient refused   Transportation Needs: Unknown    Lack of Transportation (Medical): Patient refused    Lack of Transportation (Non-Medical): Patient refused   Physical Activity: Unknown    Days of Exercise per Week: Patient refused    Minutes of Exercise per Session: 0 min   Stress: Unknown    Feeling of Stress : Patient refused   Social Connections: Unknown    Frequency of Communication with Friends and Family: Patient refused    Frequency of Social Gatherings with Friends and Family: Patient refused    Active Member of Clubs or Organizations: Patient refused    Attends Club or Organization Meetings: More than 4 times per year    Marital Status: Patient refused   Housing Stability: Unknown    Unable to Pay for Housing in the Last Year: Patient refused    Number of Places Lived in the Last Year: 1    Unstable Housing in the Last Year: Patient refused       Review of patient's allergies indicates:   Allergen Reactions    Cymbalta [duloxetine] Nausea And Vomiting    Polysporin [bacitracin-polymyxin b] Rash    Statins-hmg-coa reductase inhibitors      "muscles freezing and could not use them"    Codeine Nausea And Vomiting    Gamma immune glob from whey Other (See Comments)     shakes    Lidocaine Itching     Topical lidocaine    Penicillins Hives    Sulfa (sulfonamide antibiotics) Swelling    Bacitracin Rash       Review of Systems:  General ROS: negative for - " "fever  Psychological ROS: negative for - hostility  Hematological and Lymphatic ROS: negative for - bleeding problems  Endocrine ROS: negative for - unexpected weight changes  Respiratory ROS: no cough, shortness of breath, or wheezing  Cardiovascular ROS: no chest pain or dyspnea on exertion  Gastrointestinal ROS: no abdominal pain, change in bowel habits, or black or bloody stools  Musculoskeletal ROS: negative for - muscular weakness  Neurological ROS: negative for - bowel and bladder control changes or numbness/tingling  Dermatological ROS: negative for rash    Physical Exam:  Vitals:    06/06/22 1316   BP: (!) 198/79   Pulse: 91   SpO2: 95%   Weight: 70.2 kg (154 lb 12.2 oz)   Height: 5' 5" (1.651 m)   PainSc: 0-No pain     Body mass index is 25.75 kg/m².     Gen: NAD, pleasant, well groomed  Gait:  Presented in wheelchair  Psych:  Mood appropriate for given condition  HEENT: eyes anicteric   GI: Abd soft  CV: RRR  Lungs: breathing unlabored   ROM: limited AROM of the L spine in all planes, full ROM at ankles, knees and hips  Lumbar flexion 90 degrees, extension 50 degrees, side bending 30 degrees.    Sensation: intact to light touch in all dermatomes tested from L2-S1 bilaterally  Reflexes: 0/0 b/l patella and 0/0 b/l achilles  Palpation:  -TTP over the b/l greater trochanters and bilateral SI joint  Tone: normal in the b/l knees and hips   Skin: over her midline lumbar incison she has some erythema that looks consistent with dermatitis.  Over her IPG incision she has more distinct erythema that is well circumcised in a oval appearance around her incision.  Neither site has any discharge or purulence or induration.  The IPG site erythema is brighter compared to the midline incision.  She doesn't have much to any tenderness to palpation over either site.  There is no dehiscence.   Extremities: No edema in b/l ankles or hands  Provacative tests:        Right Left   L2/3 Iliacus Hip flexion  5  5   L3/4 Qudratus " Femoris Knee Extension  5  5   L4/5 Tib Anterior Ankle Dorsiflexion   5  5   L5/S1 Extensor Hallicus Longus Great toe extension  5  5                 S1/S2 Gastroc/Soleus Plantar Flexion  5  5       Imaging:  MRI lumbar spine 5/17/19  FINDINGS:  The conus ends at T12-L1.  No diffuse abnormal marrow or central conus signal is appreciated. No paraspinal fluid collections are appreciated. Osseous alignment appears maintained. There is multilevel facet, ligamentous hypertrophy mid to lower lumbar predominantly similar as well as maintained alignment demonstrating multilevel disc space narrowing, desiccation and marginal anterolisthesis L4-5.  There is Modic endplate degeneration at L2-3 slightly increased overall.  No interval extruded  type fragment is appreciated.     L1-2 demonstrates some broad-based concentric disc bulging, thecal sac triangulation as well as mild lateral recess, inferior neural foraminal narrowing bilaterally.  L2-3 demonstrates broad-based concentric disc bulging with thecal sac triangulation as well as moderate neural foraminal narrowing bilaterally right slightly more so than left.  L3-4 demonstrates broad-based concentric disc bulging with thecal sac triangulation, narrowing overall.  There is some annular fissuring as well as moderate left, moderate to significant right neural foraminal narrowing with some posterior element hypertrophy, spurring.  L4-5 demonstrates broad-based concentric disc bulging with annular fissuring.  There is moderate neural foraminal narrowing demonstrated bilaterally with spinal canal narrowing overall along with exuberant posterior element, ligamentous hypertrophy.  L5-S1 demonstrates some broad-based concentric disc bulging with annular fissuring and small to moderate central protrusion.  There is significant neural foraminal narrowing demonstrated on the left as well as moderate right.    MRI lumbar spine 12/5/21  FINDINGS:  NOMENCLATURE: Five lumbar  type vertebral bodies.     CORD/CAUDA EQUINA: Conus has normal size and signal and ends at a normal level of L1-L2.     ALIGNMENT: Trace retrolisthesis of L1 on L2, L2 on L3 and L3 on L4.  4 mm grade 1 anterolisthesis of L4 on L5.  levoscoliosis with a Segundo angle of 18.1 degrees using the superior T12 and inferior L5 endplates (series 8, image 9).     BONES: Vertebral body heights are maintained.  Multilevel endplate changes, greatest at L2-3 and L5-S1 where there are type 1 endplate changes.  Prominent type 2 endplate changes on the right at L3-4.  Hemangioma in the left T12 pedicle and S1 vertebral body.  No aggressive bone marrow signal.     PARASPINAL AREA: Normal.     LUMBAR DISC LEVELS:     T12-L1: No disc herniation or significant posterior osteophytic ridging.  Ligamentum flavum thickening.  No significant spinal canal or foraminal stenosis.  L1-L2: Trace retrolisthesis.  Mild disc bulge.  Disc height loss.  Ligamentum flavum thickening.  Mild narrowing of the bilateral lateral recesses.  No significant spinal canal stenosis.  Mild bilateral foraminal stenosis.  L2-L3: Trace retrolisthesis.  Mild disc bulge.  Disc height loss.  Ligamentum flavum thickening.  Mild bilateral facet hypertrophy.  Mild right greater than left narrowing of the lateral recesses, minimally increased from prior study.  No significant spinal canal stenosis.  Mild-moderate right and mild left foraminal stenosis.  L3-L4: Trace retrolisthesis.  Mild disc bulge.  Disc height loss.  Bilateral facet hypertrophy.  Ligamentum flavum thickening.  Moderate right and mild-moderate left narrowing of the lateral recesses.  Mild spinal canal stenosis, minimally decreased from prior study.  Mild-moderate right and mild left foraminal stenosis.  This appears less pronounced on the right compared to prior study.  L4-L5: Anterolisthesis.  Unroofing of the disc and mild disc bulge.  Moderate narrowing of the bilateral lateral recesses.  Severe spinal  canal stenosis, unchanged.  Mild bilateral foraminal stenosis, unchanged.  L5-S1: Mild disc bulge, eccentric to the left.  Moderate-marked left and mild right facet hypertrophy.  Ligamentum flavum thickening, greater on the left.  Moderate left and minimal right narrowing of the lateral recesses.  No significant spinal canal stenosis.  Moderate-severe left and minimal right foraminal stenosis.  This is progressed on the left compared to prior study.    Labs:  BMP  Lab Results   Component Value Date     02/16/2022    K 4.8 02/16/2022     02/16/2022    CO2 27 02/16/2022    BUN 16 02/16/2022    CREATININE 0.8 02/16/2022    CALCIUM 9.9 02/16/2022    ANIONGAP 10 02/16/2022    ESTGFRAFRICA >60.0 02/16/2022    EGFRNONAA >60.0 02/16/2022     Lab Results   Component Value Date    ALT 15 02/16/2022    AST 17 02/16/2022    ALKPHOS 66 02/16/2022    BILITOT 0.4 02/16/2022     Lab Results   Component Value Date    WBC 7.50 01/11/2022    HGB 13.0 01/11/2022    HCT 40.3 01/11/2022    MCV 93 01/11/2022     01/11/2022       Assessment:   Problem List Items Addressed This Visit        Neuro    Lumbar radiculopathy      Other Visit Diagnoses     Chronic pain syndrome    -  Primary        87 y.o. year old female presents to the office with back pain.  She's had chronic lower back pain for the past 8 years that has been gradually worsening.      6/6/22 - Libby Estrada returns to the office for follow up.  She is status post SCS implant on 5/30/22.  Today she reports similar relief to her SCS.  She reports she can stand up straight with an improvement in her mobility.  She also reports some pain down her right leg but that with reprogramming earlier today she has similar relief, about 80%, of her right sided radicular pain.    She reports she had temp to 99 degress on post op day 3, but since then no fever/chills.  Denies any new numbness or weakness.    - on exam she has full strength and intact sensation to light  touch.  - over her midline lumbar incison she has some erythema that looks consistent with dermatitis.  Over her IPG incision she has more distinct erythema that is well circumcised in a oval appearance around her incision.  Neither site has any discharge or purulence or induration.  The IPG site erythema is brighter compared to the midline incision.  She doesn't have much to any tenderness to palpation over either site.  There is no dehiscence.   - her skin reaction could be secondary to the adhesive of the miopore, bacitracin used for infection prevention, or reaction to the suture.  - I've prescribed her doxycylcine (allergy to sulfa) 100mg bid as a prophylactic measure against potential infection given the inflammation around her incision.    - she can shower but understands to keep the area dry.   - she is going to follow up in 3 days or sooner if needed.     :  Reviewed    This note was completed with dictation software and grammatical errors may exist.

## 2022-06-09 ENCOUNTER — OFFICE VISIT (OUTPATIENT)
Dept: PAIN MEDICINE | Facility: CLINIC | Age: 87
End: 2022-06-09
Payer: MEDICARE

## 2022-06-09 VITALS
BODY MASS INDEX: 25.76 KG/M2 | WEIGHT: 154.63 LBS | DIASTOLIC BLOOD PRESSURE: 80 MMHG | OXYGEN SATURATION: 96 % | SYSTOLIC BLOOD PRESSURE: 195 MMHG | HEART RATE: 89 BPM | HEIGHT: 65 IN

## 2022-06-09 DIAGNOSIS — M54.16 LUMBAR RADICULOPATHY: ICD-10-CM

## 2022-06-09 DIAGNOSIS — G89.4 CHRONIC PAIN SYNDROME: Primary | ICD-10-CM

## 2022-06-09 PROCEDURE — 99213 OFFICE O/P EST LOW 20 MIN: CPT | Mod: PBBFAC,PN | Performed by: ANESTHESIOLOGY

## 2022-06-09 PROCEDURE — 99999 PR PBB SHADOW E&M-EST. PATIENT-LVL III: ICD-10-PCS | Mod: PBBFAC,,, | Performed by: ANESTHESIOLOGY

## 2022-06-09 PROCEDURE — 99999 PR PBB SHADOW E&M-EST. PATIENT-LVL III: CPT | Mod: PBBFAC,,, | Performed by: ANESTHESIOLOGY

## 2022-06-09 PROCEDURE — 99024 POSTOP FOLLOW-UP VISIT: CPT | Mod: POP,,, | Performed by: ANESTHESIOLOGY

## 2022-06-09 PROCEDURE — 99024 PR POST-OP FOLLOW-UP VISIT: ICD-10-PCS | Mod: POP,,, | Performed by: ANESTHESIOLOGY

## 2022-06-09 NOTE — PROGRESS NOTES
[facility] Follow Up Evaluation    Referred by: Cristine Hidalgo PA-C  Reason for referral: back pain    CC:   Chief Complaint   Patient presents with    Follow-up      Last 3 PDI Scores 5/16/2022 4/26/2022 3/24/2022   Pain Disability Index (PDI) 42 31 70       Interval HPI 6/9/2022: Libby Estrada returns to the office for follow up.  She is status post SCS implant on 5/30/22.  Today she reports she has good relief of her back and leg pain.  She denies any pain of her IPG site.  She denies any fevers.  She reports that this morning she has had indigestion and pain down her left arm that has come and gone    Pain Intervention History:    - s/p L5/S1 LENY on 11/11/20 with about 50% relief of her low back pain.  - s/p L5/S1 LENY on 12/20/21 with 50% relief.  - s/p SCS trial on 05/11/2022 with nearly 100% improvement in her mobility as she reports she is able to stand up straight while ambulating and 85% relief of her back pain and her leg pain    HPI:   Libby Estrada is a 87 y.o. female who complains of back pain    Onset: about 8 years  Inciting Event: none  Progression: since onset, pain is gradually worsening   Current Pain Score: 10/10  Typical Range: 6-10/10  Timing: constant  Quality: burning, sharp, stabbing   Radiation: yes, down the back of both legs L>R  Associated numbness or weakness: no numbness, no weakness   Exacerbated by: standing, walking, bending  Allievated by: rest, sitting, medications, laying down.   Is Pain Level Acceptable?: No    Previous Therapies:  PT/OT: yes, in the past  HEP:   Interventions:   Surgery:  Medications:   - NSAIDS:   - MSK Relaxants: baclofen, Robaxin  - TCAs:   - SNRIs:  Cymbalta- did not tolerate  - Topicals:   - Anticonvulsants: gabapentin  - Opioids:  Tramadol    History:    Current Outpatient Medications:     acetaminophen (TYLENOL ARTHRITIS PAIN ORAL), Take by mouth., Disp: , Rfl:     COVID-19 vacc,mRNA,Moderna,/PF (MODERNA COVID-19 VACCINE, EUA, IM), PHARMACY  ADMINISTERED, Disp: , Rfl:     dorzolamide-timolol 2-0.5% (COSOPT) 22.3-6.8 mg/mL ophthalmic solution, USE ONE DROP IN EACH EYE TWICE DAILY. (50 DAY SUPPLY PER 10ML), Disp: 30 mL, Rfl: 3    doxycycline (MONODOX) 100 MG capsule, Take 1 capsule (100 mg total) by mouth 2 (two) times daily. for 10 days, Disp: 20 capsule, Rfl: 0    ezetimibe (ZETIA) 10 mg tablet, TAKE ONE TABLET BY MOUTH ONCE DAILY, Disp: 90 tablet, Rfl: 3    gabapentin (NEURONTIN) 300 MG capsule, Take 1 capsule (300 mg total) by mouth 3 (three) times daily., Disp: 90 capsule, Rfl: 2    latanoprost 0.005 % ophthalmic solution, PLACE ONE DROP IN EACH EYE EVERY EVENING, Disp: 2.5 mL, Rfl: 12    MULTIVITAMIN W-MINERALS/LUTEIN (CENTRUM SILVER ORAL), Take by mouth., Disp: , Rfl:     oxyCODONE-acetaminophen (PERCOCET) 5-325 mg per tablet, Take 1 tablet by mouth every 6 (six) hours as needed for Pain., Disp: 15 tablet, Rfl: 0    traMADoL (ULTRAM) 50 mg tablet, Take 1 tablet (50 mg total) by mouth 3 (three) times daily as needed for Pain., Disp: 21 tablet, Rfl: 0    vitamin D 1000 units Tab, Take 1,000 Units by mouth once daily., Disp: , Rfl:     baclofen (LIORESAL) 10 MG tablet, Take 1 tablet (10 mg total) by mouth 3 (three) times daily., Disp: 60 tablet, Rfl: 01    Past Medical History:   Diagnosis Date    Arthritis     Cancer     skin cancer    Cataract     Done OU    Episode of hypertension 12/28/2012    pt states she does not have, Maybe white coat syndrome    GERD (gastroesophageal reflux disease)     Glaucoma     suspect    Hyperlipidemia     Ocular hypertension     Spinal stenosis        Past Surgical History:   Procedure Laterality Date    APPENDECTOMY      CATARACT EXTRACTION W/  INTRAOCULAR LENS IMPLANT Right 07/08/2015    Dr Engel    CATARACT EXTRACTION W/  INTRAOCULAR LENS IMPLANT Left 01/16/2020    Dr Francois    COLONOSCOPY      EPIDURAL STEROID INJECTION INTO LUMBAR SPINE N/A 11/11/2020    Procedure:  Injection-steroid-epidural-lumbar L5/S1;  Surgeon: Cachorro Figueroa MD;  Location: [facility] OR;  Service: Pain Management;  Laterality: N/A;    EPIDURAL STEROID INJECTION INTO LUMBAR SPINE N/A 2021    Procedure: Injection-steroid-epidural-lumbar L5/S1 to the left;  Surgeon: Cachorro Figueroa MD;  Location: [facility] OR;  Service: Pain Management;  Laterality: N/A;    INSERTION OF DORSAL COLUMN NERVE STIMULATOR FOR TRIAL Bilateral 2022    Procedure: INSERTION, NEUROSTIMULATOR, SPINAL CORD, DORSAL COLUMN, FOR TRIAL;  Surgeon: Cachorro Figueroa MD;  Location: [facility] OR;  Service: Pain Management;  Laterality: Bilateral;    INSERTION OF SPINAL NEUROSTIMULATOR N/A 2022    Procedure: INSERTION, NEUROSTIMULATOR, SPINAL;  Surgeon: Cachorro Figueroa MD;  Location: [facility] OR;  Service: Pain Management;  Laterality: N/A;    LUMBAR EPIDURAL INJECTION      MOUTH SURGERY      dental implants    PHACOEMULSIFICATION OF CATARACT Left 2020    Procedure: PHACOEMULSIFICATION, CATARACT;  Surgeon: Francis Francois Jr., MD;  Location: [facility] OR;  Service: Ophthalmology;  Laterality: Left;  Left    Yag Capsulotomy Right 10/01/2021    Dr Francois       Family History   Problem Relation Age of Onset    Diabetes Mother     Heart attack Mother     Cancer Maternal Aunt         ovarian    Heart attack Father     Arthritis Father     Heart disease Father          at age 68    Heart attack Sister     Heart attack Brother     Amblyopia Neg Hx     Blindness Neg Hx     Cataracts Neg Hx     Hypertension Neg Hx     Macular degeneration Neg Hx     Retinal detachment Neg Hx     Strabismus Neg Hx     Stroke Neg Hx     Thyroid disease Neg Hx     Glaucoma Neg Hx        Social History     Socioeconomic History    Marital status:    Occupational History    Occupation: retired   Tobacco Use    Smoking status: Never Smoker    Smokeless tobacco: Never Used   Substance and Sexual Activity    Alcohol use: Yes      "Alcohol/week: 2.0 standard drinks     Types: 2 Glasses of wine per week    Drug use: No    Sexual activity: Not Currently     Social Determinants of Health     Financial Resource Strain: Unknown    Difficulty of Paying Living Expenses: Patient refused   Food Insecurity: Unknown    Worried About Running Out of Food in the Last Year: Never true    Ran Out of Food in the Last Year: Patient refused   Transportation Needs: Unknown    Lack of Transportation (Medical): Patient refused    Lack of Transportation (Non-Medical): Patient refused   Physical Activity: Unknown    Days of Exercise per Week: Patient refused    Minutes of Exercise per Session: 0 min   Stress: Unknown    Feeling of Stress : Patient refused   Social Connections: Unknown    Frequency of Communication with Friends and Family: Patient refused    Frequency of Social Gatherings with Friends and Family: Patient refused    Active Member of Clubs or Organizations: Patient refused    Attends Club or Organization Meetings: More than 4 times per year    Marital Status: Patient refused   Housing Stability: Unknown    Unable to Pay for Housing in the Last Year: Patient refused    Number of Places Lived in the Last Year: 1    Unstable Housing in the Last Year: Patient refused       Review of patient's allergies indicates:   Allergen Reactions    Cymbalta [duloxetine] Nausea And Vomiting    Polysporin [bacitracin-polymyxin b] Rash    Statins-hmg-coa reductase inhibitors      "muscles freezing and could not use them"    Codeine Nausea And Vomiting    Gamma immune glob from whey Other (See Comments)     shakes    Lidocaine Itching     Topical lidocaine    Penicillins Hives    Sulfa (sulfonamide antibiotics) Swelling    Bacitracin Rash       Review of Systems:  General ROS: negative for - fever  Psychological ROS: negative for - hostility  Hematological and Lymphatic ROS: negative for - bleeding problems  Endocrine ROS: negative for - " "unexpected weight changes  Respiratory ROS: no cough, shortness of breath, or wheezing  Cardiovascular ROS: no chest pain or dyspnea on exertion  Gastrointestinal ROS: no abdominal pain, change in bowel habits, or black or bloody stools  Musculoskeletal ROS: negative for - muscular weakness  Neurological ROS: negative for - bowel and bladder control changes or numbness/tingling  Dermatological ROS: negative for rash    Physical Exam:  Vitals:    06/09/22 1311   BP: (!) 195/80   Pulse: 89   SpO2: 96%   Weight: 70.1 kg (154 lb 10.4 oz)   Height: 5' 5" (1.651 m)   PainSc:   2   PainLoc: Leg     Body mass index is 25.74 kg/m².     Gen: NAD, pleasant, well groomed  Gait:  Presented in wheelchair  Psych:  Mood appropriate for given condition  HEENT: eyes anicteric   GI: Abd soft  CV: RRR  Lungs: breathing unlabored   ROM: limited AROM of the L spine in all planes, full ROM at ankles, knees and hips  Lumbar flexion 90 degrees, extension 50 degrees, side bending 30 degrees.    Sensation: intact to light touch in all dermatomes tested from L2-S1 bilaterally  Reflexes: 0/0 b/l patella and 0/0 b/l achilles  Palpation:  -TTP over the b/l greater trochanters and bilateral SI joint  Tone: normal in the b/l knees and hips   Skin: over her midline lumbar incison she has some erythema that is improved since last time I saw her.  No wound dehiscence.  No purulence.  No tenderness.  No induration.  Extremities: No edema in b/l ankles or hands  Provacative tests:        Right Left   L2/3 Iliacus Hip flexion  5  5   L3/4 Qudratus Femoris Knee Extension  5  5   L4/5 Tib Anterior Ankle Dorsiflexion   5  5   L5/S1 Extensor Hallicus Longus Great toe extension  5  5                 S1/S2 Gastroc/Soleus Plantar Flexion  5  5       Imaging:  MRI lumbar spine 5/17/19  FINDINGS:  The conus ends at T12-L1.  No diffuse abnormal marrow or central conus signal is appreciated. No paraspinal fluid collections are appreciated. Osseous alignment appears " maintained. There is multilevel facet, ligamentous hypertrophy mid to lower lumbar predominantly similar as well as maintained alignment demonstrating multilevel disc space narrowing, desiccation and marginal anterolisthesis L4-5.  There is Modic endplate degeneration at L2-3 slightly increased overall.  No interval extruded  type fragment is appreciated.     L1-2 demonstrates some broad-based concentric disc bulging, thecal sac triangulation as well as mild lateral recess, inferior neural foraminal narrowing bilaterally.  L2-3 demonstrates broad-based concentric disc bulging with thecal sac triangulation as well as moderate neural foraminal narrowing bilaterally right slightly more so than left.  L3-4 demonstrates broad-based concentric disc bulging with thecal sac triangulation, narrowing overall.  There is some annular fissuring as well as moderate left, moderate to significant right neural foraminal narrowing with some posterior element hypertrophy, spurring.  L4-5 demonstrates broad-based concentric disc bulging with annular fissuring.  There is moderate neural foraminal narrowing demonstrated bilaterally with spinal canal narrowing overall along with exuberant posterior element, ligamentous hypertrophy.  L5-S1 demonstrates some broad-based concentric disc bulging with annular fissuring and small to moderate central protrusion.  There is significant neural foraminal narrowing demonstrated on the left as well as moderate right.    MRI lumbar spine 12/5/21  FINDINGS:  NOMENCLATURE: Five lumbar type vertebral bodies.     CORD/CAUDA EQUINA: Conus has normal size and signal and ends at a normal level of L1-L2.     ALIGNMENT: Trace retrolisthesis of L1 on L2, L2 on L3 and L3 on L4.  4 mm grade 1 anterolisthesis of L4 on L5.  levoscoliosis with a Segundo angle of 18.1 degrees using the superior T12 and inferior L5 endplates (series 8, image 9).     BONES: Vertebral body heights are maintained.  Multilevel  endplate changes, greatest at L2-3 and L5-S1 where there are type 1 endplate changes.  Prominent type 2 endplate changes on the right at L3-4.  Hemangioma in the left T12 pedicle and S1 vertebral body.  No aggressive bone marrow signal.     PARASPINAL AREA: Normal.     LUMBAR DISC LEVELS:     T12-L1: No disc herniation or significant posterior osteophytic ridging.  Ligamentum flavum thickening.  No significant spinal canal or foraminal stenosis.  L1-L2: Trace retrolisthesis.  Mild disc bulge.  Disc height loss.  Ligamentum flavum thickening.  Mild narrowing of the bilateral lateral recesses.  No significant spinal canal stenosis.  Mild bilateral foraminal stenosis.  L2-L3: Trace retrolisthesis.  Mild disc bulge.  Disc height loss.  Ligamentum flavum thickening.  Mild bilateral facet hypertrophy.  Mild right greater than left narrowing of the lateral recesses, minimally increased from prior study.  No significant spinal canal stenosis.  Mild-moderate right and mild left foraminal stenosis.  L3-L4: Trace retrolisthesis.  Mild disc bulge.  Disc height loss.  Bilateral facet hypertrophy.  Ligamentum flavum thickening.  Moderate right and mild-moderate left narrowing of the lateral recesses.  Mild spinal canal stenosis, minimally decreased from prior study.  Mild-moderate right and mild left foraminal stenosis.  This appears less pronounced on the right compared to prior study.  L4-L5: Anterolisthesis.  Unroofing of the disc and mild disc bulge.  Moderate narrowing of the bilateral lateral recesses.  Severe spinal canal stenosis, unchanged.  Mild bilateral foraminal stenosis, unchanged.  L5-S1: Mild disc bulge, eccentric to the left.  Moderate-marked left and mild right facet hypertrophy.  Ligamentum flavum thickening, greater on the left.  Moderate left and minimal right narrowing of the lateral recesses.  No significant spinal canal stenosis.  Moderate-severe left and minimal right foraminal stenosis.  This is  progressed on the left compared to prior study.    Labs:  BMP  Lab Results   Component Value Date     02/16/2022    K 4.8 02/16/2022     02/16/2022    CO2 27 02/16/2022    BUN 16 02/16/2022    CREATININE 0.8 02/16/2022    CALCIUM 9.9 02/16/2022    ANIONGAP 10 02/16/2022    ESTGFRAFRICA >60.0 02/16/2022    EGFRNONAA >60.0 02/16/2022     Lab Results   Component Value Date    ALT 15 02/16/2022    AST 17 02/16/2022    ALKPHOS 66 02/16/2022    BILITOT 0.4 02/16/2022     Lab Results   Component Value Date    WBC 7.50 01/11/2022    HGB 13.0 01/11/2022    HCT 40.3 01/11/2022    MCV 93 01/11/2022     01/11/2022       Assessment:   Problem List Items Addressed This Visit        Neuro    Lumbar radiculopathy      Other Visit Diagnoses     Chronic pain syndrome    -  Primary        87 y.o. year old female presents to the office with back pain.  She's had chronic lower back pain for the past 8 years that has been gradually worsening.      6/9/22 - Libby Estrada returns to the office for follow up.  She is status post SCS implant on 5/30/22.  Today she reports she has good relief of her back and leg pain.  She denies any pain of her IPG site.  She denies any fevers.  She reports that this morning she has had indigestion and pain down her left arm that has come and gone.    - on her exam her incisions are well approximated.  There is no evidence of dehiscence.  There is no induration.  There is no tenderness.  She still has an oval-shaped erythema portion over the IPG site however this looks markedly improved since I last saw her.  - I think that the irritation around her insertion IPG site is related to adhesive and bacitracin reaction.  We did discussed that I used bacitracin as a measure to decrease risk of infection with risks/benefits weight as reaction with rash was possible.  - she has been having some diarrhea since I last saw her that is likely related to the doxycycline I prescribed for her  prophylactically.  She was previously taking a probiotic but stopped recently.  I recommended to resume taking a probiotic.  - infection is still in the differential and I would like to get a CBC, ESR, CRP however given her complaints of indigestion and left arm pain I have recommended that she go to the ER for a full workup including a cardiac workup  - we will plan to see her in the office next week    :  Reviewed    This note was completed with dictation software and grammatical errors may exist.

## 2022-06-13 ENCOUNTER — PATIENT MESSAGE (OUTPATIENT)
Dept: PAIN MEDICINE | Facility: CLINIC | Age: 87
End: 2022-06-13
Payer: MEDICARE

## 2022-06-15 ENCOUNTER — TELEPHONE (OUTPATIENT)
Dept: PAIN MEDICINE | Facility: CLINIC | Age: 87
End: 2022-06-15
Payer: MEDICARE

## 2022-06-16 ENCOUNTER — OFFICE VISIT (OUTPATIENT)
Dept: PAIN MEDICINE | Facility: CLINIC | Age: 87
End: 2022-06-16
Payer: MEDICARE

## 2022-06-16 VITALS
DIASTOLIC BLOOD PRESSURE: 87 MMHG | OXYGEN SATURATION: 97 % | HEIGHT: 65 IN | HEART RATE: 97 BPM | BODY MASS INDEX: 25.95 KG/M2 | WEIGHT: 155.75 LBS | SYSTOLIC BLOOD PRESSURE: 209 MMHG

## 2022-06-16 DIAGNOSIS — G89.4 CHRONIC PAIN SYNDROME: Primary | ICD-10-CM

## 2022-06-16 PROCEDURE — 99214 OFFICE O/P EST MOD 30 MIN: CPT | Mod: PBBFAC,PN | Performed by: ANESTHESIOLOGY

## 2022-06-16 PROCEDURE — 99024 POSTOP FOLLOW-UP VISIT: CPT | Mod: POP,,, | Performed by: ANESTHESIOLOGY

## 2022-06-16 PROCEDURE — 99999 PR PBB SHADOW E&M-EST. PATIENT-LVL IV: CPT | Mod: PBBFAC,,, | Performed by: ANESTHESIOLOGY

## 2022-06-16 PROCEDURE — 99024 PR POST-OP FOLLOW-UP VISIT: ICD-10-PCS | Mod: POP,,, | Performed by: ANESTHESIOLOGY

## 2022-06-16 PROCEDURE — 99999 PR PBB SHADOW E&M-EST. PATIENT-LVL IV: ICD-10-PCS | Mod: PBBFAC,,, | Performed by: ANESTHESIOLOGY

## 2022-06-16 NOTE — PROGRESS NOTES
[facility] Follow Up Evaluation    Referred by: Cristine Hidalgo PA-C  Reason for referral: back pain    CC:   Chief Complaint   Patient presents with    folow up      Last 3 PDI Scores 5/16/2022 4/26/2022 3/24/2022   Pain Disability Index (PDI) 42 31 70       Interval HPI 6/9/2022: Libby Estrada returns to the office for follow up.  She is status post SCS implant on 5/30/22.  Today she reports she continues to have some good relief of back pain.  She is also extremely happy that now she can not stand up straight which she was not previously able to do prior to the stim trial and implant.  She can occasionally get some pain radiating down the sides of both her legs.  She has completed her antibiotics.  She denies any fevers.  Her workup at the ER was negative for any infectious or cardiac event.    Pain Intervention History:    - s/p L5/S1 LENY on 11/11/20 with about 50% relief of her low back pain.  - s/p L5/S1 LENY on 12/20/21 with 50% relief.  - s/p SCS trial on 05/11/2022 with nearly 100% improvement in her mobility as she reports she is able to stand up straight while ambulating and 85% relief of her back pain and her leg pain  - s/p SCS implant on 5/30/22    HPI:   Libby Estrada is a 87 y.o. female who complains of back pain    Onset: about 8 years  Inciting Event: none  Progression: since onset, pain is gradually worsening   Current Pain Score: 10/10  Typical Range: 6-10/10  Timing: constant  Quality: burning, sharp, stabbing   Radiation: yes, down the back of both legs L>R  Associated numbness or weakness: no numbness, no weakness   Exacerbated by: standing, walking, bending  Allievated by: rest, sitting, medications, laying down.   Is Pain Level Acceptable?: No    Previous Therapies:  PT/OT: yes, in the past  HEP:   Interventions:   Surgery:  Medications:   - NSAIDS:   - MSK Relaxants: baclofen, Robaxin  - TCAs:   - SNRIs:  Cymbalta- did not tolerate  - Topicals:   - Anticonvulsants: gabapentin  -  Opioids:  Tramadol    History:    Current Outpatient Medications:     acetaminophen (TYLENOL ARTHRITIS PAIN ORAL), Take by mouth., Disp: , Rfl:     COVID-19 vacc,mRNA,Moderna,/PF (MODERNA COVID-19 VACCINE, EUA, IM), PHARMACY ADMINISTERED, Disp: , Rfl:     dorzolamide-timolol 2-0.5% (COSOPT) 22.3-6.8 mg/mL ophthalmic solution, USE ONE DROP IN EACH EYE TWICE DAILY. (50 DAY SUPPLY PER 10ML), Disp: 30 mL, Rfl: 3    doxycycline (MONODOX) 100 MG capsule, Take 1 capsule (100 mg total) by mouth 2 (two) times daily. for 10 days, Disp: 20 capsule, Rfl: 0    ezetimibe (ZETIA) 10 mg tablet, TAKE ONE TABLET BY MOUTH ONCE DAILY, Disp: 90 tablet, Rfl: 3    gabapentin (NEURONTIN) 300 MG capsule, Take 1 capsule (300 mg total) by mouth 3 (three) times daily., Disp: 90 capsule, Rfl: 2    latanoprost 0.005 % ophthalmic solution, PLACE ONE DROP IN EACH EYE EVERY EVENING, Disp: 2.5 mL, Rfl: 12    MULTIVITAMIN W-MINERALS/LUTEIN (CENTRUM SILVER ORAL), Take by mouth., Disp: , Rfl:     ondansetron (ZOFRAN-ODT) 4 MG TbDL, Take 1 tablet (4 mg total) by mouth every 8 (eight) hours as needed (Nausea)., Disp: 20 tablet, Rfl: 0    oxyCODONE-acetaminophen (PERCOCET) 5-325 mg per tablet, Take 1 tablet by mouth every 6 (six) hours as needed for Pain., Disp: 15 tablet, Rfl: 0    traMADoL (ULTRAM) 50 mg tablet, Take 1 tablet (50 mg total) by mouth 3 (three) times daily as needed for Pain., Disp: 21 tablet, Rfl: 0    vitamin D 1000 units Tab, Take 1,000 Units by mouth once daily., Disp: , Rfl:     Past Medical History:   Diagnosis Date    Arthritis     Cancer     skin cancer    Cataract     Done OU    Episode of hypertension 12/28/2012    pt states she does not have, Maybe white coat syndrome    GERD (gastroesophageal reflux disease)     Glaucoma     suspect    Hyperlipidemia     Ocular hypertension     Spinal stenosis        Past Surgical History:   Procedure Laterality Date    APPENDECTOMY      CATARACT EXTRACTION W/   INTRAOCULAR LENS IMPLANT Right 2015    Dr Engel    CATARACT EXTRACTION W/  INTRAOCULAR LENS IMPLANT Left 2020    Dr Francois    [facility]      EPIDURAL STEROID INJECTION INTO LUMBAR SPINE N/A 2020    Procedure: Injection-steroid-epidural-lumbar L5/S1;  Surgeon: Cachorro Figueroa MD;  Location: [facility] OR;  Service: Pain Management;  Laterality: N/A;    EPIDURAL STEROID INJECTION INTO LUMBAR SPINE N/A 2021    Procedure: Injection-steroid-epidural-lumbar L5/S1 to the left;  Surgeon: Cachorro Figueroa MD;  Location: [facility] OR;  Service: Pain Management;  Laterality: N/A;    INSERTION OF DORSAL COLUMN NERVE STIMULATOR FOR TRIAL Bilateral 2022    Procedure: INSERTION, NEUROSTIMULATOR, SPINAL CORD, DORSAL COLUMN, FOR TRIAL;  Surgeon: Cachorro Figueroa MD;  Location: [facility] OR;  Service: Pain Management;  Laterality: Bilateral;    INSERTION OF SPINAL NEUROSTIMULATOR N/A 2022    Procedure: INSERTION, NEUROSTIMULATOR, SPINAL;  Surgeon: Cachorro Figueroa MD;  Location: [facility] OR;  Service: Pain Management;  Laterality: N/A;    LUMBAR EPIDURAL INJECTION      MOUTH SURGERY      dental implants    PHACOEMULSIFICATION OF CATARACT Left 2020    Procedure: PHACOEMULSIFICATION, CATARACT;  Surgeon: Francis Francois Jr., MD;  Location: [facility] OR;  Service: Ophthalmology;  Laterality: Left;  Left    Yag Capsulotomy Right 10/01/2021    Dr Francois       Family History   Problem Relation Age of Onset    Diabetes Mother     Heart attack Mother     Cancer Maternal Aunt         ovarian    Heart attack Father     Arthritis Father     Heart disease Father          at age 68    Heart attack Sister     Heart attack Brother     Amblyopia Neg Hx     Blindness Neg Hx     Cataracts Neg Hx     Hypertension Neg Hx     Macular degeneration Neg Hx     Retinal detachment Neg Hx     Strabismus Neg Hx     Stroke Neg Hx     Thyroid disease Neg Hx     Glaucoma Neg Hx        Social History  "    Socioeconomic History    Marital status:    Occupational History    Occupation: retired   Tobacco Use    Smoking status: Never Smoker    Smokeless tobacco: Never Used   Substance and Sexual Activity    Alcohol use: Yes     Alcohol/week: 2.0 standard drinks     Types: 2 Glasses of wine per week    Drug use: No    Sexual activity: Not Currently     Social Determinants of Health     Financial Resource Strain: Unknown    Difficulty of Paying Living Expenses: Patient refused   Food Insecurity: Unknown    Worried About Running Out of Food in the Last Year: Never true    Ran Out of Food in the Last Year: Patient refused   Transportation Needs: Unknown    Lack of Transportation (Medical): Patient refused    Lack of Transportation (Non-Medical): Patient refused   Physical Activity: Unknown    Days of Exercise per Week: Patient refused    Minutes of Exercise per Session: 0 min   Stress: Unknown    Feeling of Stress : Patient refused   Social Connections: Unknown    Frequency of Communication with Friends and Family: Patient refused    Frequency of Social Gatherings with Friends and Family: Patient refused    Active Member of Clubs or Organizations: Patient refused    Attends Club or Organization Meetings: More than 4 times per year    Marital Status: Patient refused   Housing Stability: Unknown    Unable to Pay for Housing in the Last Year: Patient refused    Number of Places Lived in the Last Year: 1    Unstable Housing in the Last Year: Patient refused       Review of patient's allergies indicates:   Allergen Reactions    Cymbalta [duloxetine] Nausea And Vomiting    Polysporin [bacitracin-polymyxin b] Rash    Statins-hmg-coa reductase inhibitors      "muscles freezing and could not use them"    Codeine Nausea And Vomiting    Gamma immune glob from whey Other (See Comments)     shakes    Lidocaine Itching     Topical lidocaine    Penicillins Hives    Sulfa (sulfonamide antibiotics) " "Swelling    Bacitracin Rash       Review of Systems:  General ROS: negative for - fever  Psychological ROS: negative for - hostility  Hematological and Lymphatic ROS: negative for - bleeding problems  Endocrine ROS: negative for - unexpected weight changes  Respiratory ROS: no cough, shortness of breath, or wheezing  Cardiovascular ROS: no chest pain or dyspnea on exertion  Gastrointestinal ROS: no abdominal pain, change in bowel habits, or black or bloody stools  Musculoskeletal ROS: negative for - muscular weakness  Neurological ROS: negative for - bowel and bladder control changes or numbness/tingling  Dermatological ROS: negative for rash    Physical Exam:  Vitals:    06/16/22 1407   BP: (!) 209/87   Pulse: 97   SpO2: 97%   Weight: 70.7 kg (155 lb 12.1 oz)   Height: 5' 5" (1.651 m)   PainSc:   4   PainLoc: Leg     Body mass index is 25.92 kg/m².     Gen: NAD, pleasant, well groomed  Gait:  Presented in wheelchair  Psych:  Mood appropriate for given condition  HEENT: eyes anicteric   GI: Abd soft  CV: RRR  Lungs: breathing unlabored   ROM: limited AROM of the L spine in all planes, full ROM at ankles, knees and hips  Lumbar flexion 90 degrees, extension 50 degrees, side bending 30 degrees.    Sensation: intact to light touch in all dermatomes tested from L2-S1 bilaterally  Reflexes: 0/0 b/l patella and 0/0 b/l achilles  Palpation:  -TTP over the b/l greater trochanters and bilateral SI joint  Tone: normal in the b/l knees and hips   Skin: over her midline lumbar incison she has some erythema that is improved since last time I saw her.  No wound dehiscence.  No purulence.  No tenderness.  No induration.  Extremities: No edema in b/l ankles or hands  Provacative tests:        Right Left   L2/3 Iliacus Hip flexion  5  5   L3/4 Qudratus Femoris Knee Extension  5  5   L4/5 Tib Anterior Ankle Dorsiflexion   5  5   L5/S1 Extensor Hallicus Longus Great toe extension  5  5                 S1/S2 Gastroc/Soleus Plantar " Flexion  5  5       Imaging:  MRI lumbar spine 5/17/19  FINDINGS:  The conus ends at T12-L1.  No diffuse abnormal marrow or central conus signal is appreciated. No paraspinal fluid collections are appreciated. Osseous alignment appears maintained. There is multilevel facet, ligamentous hypertrophy mid to lower lumbar predominantly similar as well as maintained alignment demonstrating multilevel disc space narrowing, desiccation and marginal anterolisthesis L4-5.  There is Modic endplate degeneration at L2-3 slightly increased overall.  No interval extruded  type fragment is appreciated.     L1-2 demonstrates some broad-based concentric disc bulging, thecal sac triangulation as well as mild lateral recess, inferior neural foraminal narrowing bilaterally.  L2-3 demonstrates broad-based concentric disc bulging with thecal sac triangulation as well as moderate neural foraminal narrowing bilaterally right slightly more so than left.  L3-4 demonstrates broad-based concentric disc bulging with thecal sac triangulation, narrowing overall.  There is some annular fissuring as well as moderate left, moderate to significant right neural foraminal narrowing with some posterior element hypertrophy, spurring.  L4-5 demonstrates broad-based concentric disc bulging with annular fissuring.  There is moderate neural foraminal narrowing demonstrated bilaterally with spinal canal narrowing overall along with exuberant posterior element, ligamentous hypertrophy.  L5-S1 demonstrates some broad-based concentric disc bulging with annular fissuring and small to moderate central protrusion.  There is significant neural foraminal narrowing demonstrated on the left as well as moderate right.    MRI lumbar spine 12/5/21  FINDINGS:  NOMENCLATURE: Five lumbar type vertebral bodies.     CORD/CAUDA EQUINA: Conus has normal size and signal and ends at a normal level of L1-L2.     ALIGNMENT: Trace retrolisthesis of L1 on L2, L2 on L3 and L3  on L4.  4 mm grade 1 anterolisthesis of L4 on L5.  levoscoliosis with a Segundo angle of 18.1 degrees using the superior T12 and inferior L5 endplates (series 8, image 9).     BONES: Vertebral body heights are maintained.  Multilevel endplate changes, greatest at L2-3 and L5-S1 where there are type 1 endplate changes.  Prominent type 2 endplate changes on the right at L3-4.  Hemangioma in the left T12 pedicle and S1 vertebral body.  No aggressive bone marrow signal.     PARASPINAL AREA: Normal.     LUMBAR DISC LEVELS:     T12-L1: No disc herniation or significant posterior osteophytic ridging.  Ligamentum flavum thickening.  No significant spinal canal or foraminal stenosis.  L1-L2: Trace retrolisthesis.  Mild disc bulge.  Disc height loss.  Ligamentum flavum thickening.  Mild narrowing of the bilateral lateral recesses.  No significant spinal canal stenosis.  Mild bilateral foraminal stenosis.  L2-L3: Trace retrolisthesis.  Mild disc bulge.  Disc height loss.  Ligamentum flavum thickening.  Mild bilateral facet hypertrophy.  Mild right greater than left narrowing of the lateral recesses, minimally increased from prior study.  No significant spinal canal stenosis.  Mild-moderate right and mild left foraminal stenosis.  L3-L4: Trace retrolisthesis.  Mild disc bulge.  Disc height loss.  Bilateral facet hypertrophy.  Ligamentum flavum thickening.  Moderate right and mild-moderate left narrowing of the lateral recesses.  Mild spinal canal stenosis, minimally decreased from prior study.  Mild-moderate right and mild left foraminal stenosis.  This appears less pronounced on the right compared to prior study.  L4-L5: Anterolisthesis.  Unroofing of the disc and mild disc bulge.  Moderate narrowing of the bilateral lateral recesses.  Severe spinal canal stenosis, unchanged.  Mild bilateral foraminal stenosis, unchanged.  L5-S1: Mild disc bulge, eccentric to the left.  Moderate-marked left and mild right facet hypertrophy.   Ligamentum flavum thickening, greater on the left.  Moderate left and minimal right narrowing of the lateral recesses.  No significant spinal canal stenosis.  Moderate-severe left and minimal right foraminal stenosis.  This is progressed on the left compared to prior study.    Labs:  BMP  Lab Results   Component Value Date     (L) 06/09/2022    K 3.6 06/09/2022     06/09/2022    CO2 28 06/09/2022    BUN 7 06/09/2022    CREATININE 0.63 06/09/2022    CALCIUM 9.4 06/09/2022    ANIONGAP 6 (L) 06/09/2022    ESTGFRAFRICA >60 06/09/2022    EGFRNONAA >60 06/09/2022     Lab Results   Component Value Date    ALT 16 06/09/2022    AST 27 06/09/2022    ALKPHOS 86 06/09/2022    BILITOT 0.2 06/09/2022     Lab Results   Component Value Date    WBC 8.29 06/09/2022    HGB 12.6 06/09/2022    HCT 37.3 06/09/2022    MCV 89 06/09/2022     06/09/2022       Assessment:   Problem List Items Addressed This Visit    None       87 y.o. year old female presents to the office with back pain.  She's had chronic lower back pain for the past 8 years that has been gradually worsening.        6/16/22 - Libby RESENDIZ Natalie returns to the office for follow up.  She is status post SCS implant on 5/30/22.  Today she reports she continues to have some good relief of back pain.  She is also extremely happy that now she can not stand up straight which she was not previously able to do prior to the stim trial and implant.  She can occasionally get some pain radiating down the sides of both her legs.  She has completed her antibiotics.  She denies any fevers.  Her workup at the ER was negative for any infectious or cardiac event.    - her rash over her IPG site continues to improve.  She currently has some mild erythema over this area.  Her incisions are well healed without any swelling or induration or tenderness.  Wounds are approximated well.  - overall I think she is healing well following the implant.  I am going to have the [employer] rep  to reach out to help her with some reprogramming to see if we can capture some of her lateral leg pain.  - she will follow-up in 6 weeks    :  Reviewed    This note was completed with dictation software and grammatical errors may exist.

## 2022-06-20 ENCOUNTER — TELEPHONE (OUTPATIENT)
Dept: FAMILY MEDICINE | Facility: CLINIC | Age: 87
End: 2022-06-20
Payer: MEDICARE

## 2022-06-20 NOTE — TELEPHONE ENCOUNTER
----- Message from Ashley Person sent at 6/20/2022 10:53 AM CDT -----  Patient was seen in ER at [facility] for diverticulitis.  The referred her to Dr Burks for GI.  She wants to know if that is who you would recommend or should she see you first? Do you have a different recommendation?  Please call her at  [phone].  Thank you!

## 2022-06-22 ENCOUNTER — OFFICE VISIT (OUTPATIENT)
Dept: OPHTHALMOLOGY | Facility: CLINIC | Age: 87
End: 2022-06-22
Payer: MEDICARE

## 2022-06-22 DIAGNOSIS — Z96.1 PSEUDOPHAKIA: ICD-10-CM

## 2022-06-22 DIAGNOSIS — H40.053 BORDERLINE GLAUCOMA OF BOTH EYES WITH OCULAR HYPERTENSION: Primary | ICD-10-CM

## 2022-06-22 DIAGNOSIS — H52.7 REFRACTIVE ERROR: ICD-10-CM

## 2022-06-22 PROCEDURE — 99213 OFFICE O/P EST LOW 20 MIN: CPT | Mod: PBBFAC,PO | Performed by: OPHTHALMOLOGY

## 2022-06-22 PROCEDURE — 92015 PR REFRACTION: ICD-10-PCS | Mod: ,,, | Performed by: OPHTHALMOLOGY

## 2022-06-22 PROCEDURE — 99214 OFFICE O/P EST MOD 30 MIN: CPT | Mod: S$PBB,,, | Performed by: OPHTHALMOLOGY

## 2022-06-22 PROCEDURE — 99214 PR OFFICE/OUTPT VISIT, EST, LEVL IV, 30-39 MIN: ICD-10-PCS | Mod: S$PBB,,, | Performed by: OPHTHALMOLOGY

## 2022-06-22 PROCEDURE — 99999 PR PBB SHADOW E&M-EST. PATIENT-LVL III: CPT | Mod: PBBFAC,,, | Performed by: OPHTHALMOLOGY

## 2022-06-22 PROCEDURE — 92015 DETERMINE REFRACTIVE STATE: CPT | Mod: ,,, | Performed by: OPHTHALMOLOGY

## 2022-06-22 PROCEDURE — 99999 PR PBB SHADOW E&M-EST. PATIENT-LVL III: ICD-10-PCS | Mod: PBBFAC,,, | Performed by: OPHTHALMOLOGY

## 2022-06-22 NOTE — PROGRESS NOTES
HPI     Overdue for IOP/Med check  DM2    Pt. States no problems with OU at this time.  Denies flashes.  + Floaters x's several years with no change.    Latanaprost QHS OU  Dorzolamide BID OU    Hemoglobin A1C       Date                     Value               Ref Range             Status                01/11/2022               6.0 (H)             4.0 - 5.6 %           Final                 07/09/2021               5.7 (H)             4.0 - 5.6 %           Final                 01/12/2021               5.8 (H)             4.0 - 5.6 %           Final              Last edited by Daisy Santillan on 6/22/2022 10:59 AM. (History)        ROS     Negative for: Constitutional, Gastrointestinal, Neurological, Skin,   Genitourinary, Musculoskeletal, HENT, Endocrine, Cardiovascular, Eyes,   Respiratory, Psychiatric, Allergic/Imm, Heme/Lymph    Last edited by Francis Francois Jr., MD on 6/22/2022 11:24 AM. (History)        Assessment /Plan     For exam results, see Encounter Report.    Borderline glaucoma of both eyes with ocular hypertension    Pseudophakia    Refractive error    Borderline glaucoma of both eyes with ocular hypertension  IOP 14 OU, Stable observe  Continue latanoprost QHS OU and Cosopt BID OU  Follow up in about 6 months (around 12/22/2022) for IOP and Medication check.  Pseudophakia  Stable observe  Refractive error  Rx for glasses given to patient

## 2022-06-23 ENCOUNTER — OFFICE VISIT (OUTPATIENT)
Dept: FAMILY MEDICINE | Facility: CLINIC | Age: 87
End: 2022-06-23
Payer: MEDICARE

## 2022-06-23 ENCOUNTER — NURSE TRIAGE (OUTPATIENT)
Dept: ADMINISTRATIVE | Facility: CLINIC | Age: 87
End: 2022-06-23
Payer: MEDICARE

## 2022-06-23 VITALS
BODY MASS INDEX: 26.04 KG/M2 | WEIGHT: 156.31 LBS | OXYGEN SATURATION: 94 % | SYSTOLIC BLOOD PRESSURE: 120 MMHG | DIASTOLIC BLOOD PRESSURE: 72 MMHG | HEIGHT: 65 IN | HEART RATE: 101 BPM

## 2022-06-23 DIAGNOSIS — R73.03 PREDIABETES: ICD-10-CM

## 2022-06-23 DIAGNOSIS — E78.5 HYPERLIPIDEMIA, UNSPECIFIED HYPERLIPIDEMIA TYPE: ICD-10-CM

## 2022-06-23 DIAGNOSIS — K57.92 DIVERTICULITIS: Primary | ICD-10-CM

## 2022-06-23 DIAGNOSIS — R19.7 DIARRHEA, UNSPECIFIED TYPE: ICD-10-CM

## 2022-06-23 PROCEDURE — 99999 PR PBB SHADOW E&M-EST. PATIENT-LVL IV: CPT | Mod: PBBFAC,,, | Performed by: FAMILY MEDICINE

## 2022-06-23 PROCEDURE — 99999 PR PBB SHADOW E&M-EST. PATIENT-LVL IV: ICD-10-PCS | Mod: PBBFAC,,, | Performed by: FAMILY MEDICINE

## 2022-06-23 PROCEDURE — 99214 PR OFFICE/OUTPT VISIT, EST, LEVL IV, 30-39 MIN: ICD-10-PCS | Mod: S$PBB,,, | Performed by: FAMILY MEDICINE

## 2022-06-23 PROCEDURE — 99214 OFFICE O/P EST MOD 30 MIN: CPT | Mod: PBBFAC,PO | Performed by: FAMILY MEDICINE

## 2022-06-23 PROCEDURE — 99214 OFFICE O/P EST MOD 30 MIN: CPT | Mod: S$PBB,,, | Performed by: FAMILY MEDICINE

## 2022-06-23 RX ORDER — DIPHENOXYLATE HYDROCHLORIDE AND ATROPINE SULFATE 2.5; .025 MG/1; MG/1
1 TABLET ORAL 4 TIMES DAILY PRN
Qty: 30 TABLET | Refills: 0 | Status: SHIPPED | OUTPATIENT
Start: 2022-06-23 | End: 2022-07-03

## 2022-06-23 NOTE — TELEPHONE ENCOUNTER
Reason for Disposition   Black or tarry bowel movements (Exception: chronic-unchanged black-grey bowel movements AND is taking iron pills or Pepto-bismol)    Additional Information   Negative: Shock suspected (e.g., cold/pale/clammy skin, too weak to stand, low BP, rapid pulse)   Negative: Difficult to awaken or acting confused (e.g., disoriented, slurred speech)   Negative: Sounds like a life-threatening emergency to the triager   Negative: [1] Blood in the stool AND [2] moderate or large amount of blood   Negative: [1] SEVERE abdominal pain AND [2] age > 60 years   Negative: [1] SEVERE abdominal pain (e.g., excruciating) AND [2] present > 1 hour    Protocols used: DIARRHEA-A-AH

## 2022-06-23 NOTE — TELEPHONE ENCOUNTER
Patient states she was put on cipro and flagyl. She says she took zofran before that and is now having diarrhea. She says the diarrhea has been off and on for weeks. She has had 5 diarrhea stools in 24 hrs. She says some of her stools have been black but she was told it was due to the antibiotics. She states she is at an assisted living facility and is not able to go to the ED tonight and may go in the morning. Please contact caller directly to discuss further care advice.

## 2022-07-28 ENCOUNTER — OFFICE VISIT (OUTPATIENT)
Dept: PAIN MEDICINE | Facility: CLINIC | Age: 87
End: 2022-07-28
Payer: MEDICARE

## 2022-07-28 VITALS
HEIGHT: 65 IN | DIASTOLIC BLOOD PRESSURE: 72 MMHG | HEART RATE: 78 BPM | BODY MASS INDEX: 25.33 KG/M2 | OXYGEN SATURATION: 96 % | SYSTOLIC BLOOD PRESSURE: 170 MMHG | WEIGHT: 152 LBS

## 2022-07-28 DIAGNOSIS — M47.816 LUMBAR SPONDYLOSIS: ICD-10-CM

## 2022-07-28 DIAGNOSIS — M54.16 LUMBAR RADICULOPATHY: Primary | ICD-10-CM

## 2022-07-28 DIAGNOSIS — G89.4 CHRONIC PAIN SYNDROME: ICD-10-CM

## 2022-07-28 PROCEDURE — 99213 OFFICE O/P EST LOW 20 MIN: CPT | Mod: S$PBB,,,

## 2022-07-28 PROCEDURE — 99999 PR PBB SHADOW E&M-EST. PATIENT-LVL III: CPT | Mod: PBBFAC,,,

## 2022-07-28 PROCEDURE — 99213 OFFICE O/P EST LOW 20 MIN: CPT | Mod: PBBFAC,PN

## 2022-07-28 PROCEDURE — 99999 PR PBB SHADOW E&M-EST. PATIENT-LVL III: ICD-10-PCS | Mod: PBBFAC,,,

## 2022-07-28 PROCEDURE — 99213 PR OFFICE/OUTPT VISIT, EST, LEVL III, 20-29 MIN: ICD-10-PCS | Mod: S$PBB,,,

## 2022-07-28 NOTE — PROGRESS NOTES
[facility] Follow Up Evaluation    Referred by: Cristine Hidalgo PA-C  Reason for referral: back pain    CC:   Chief Complaint   Patient presents with    Follow-up      Last 3 PDI Scores 5/16/2022 4/26/2022 3/24/2022   Pain Disability Index (PDI) 42 31 70     Interval HPI 7/28/2022: Libby Estrada returns to the clinic for follow up.  Today she returns for 6 week follow-up after her spinal cord stimulator implant on 05/30/2022. She continues to have some remaining pain 4-5/10, but overall states his tolerable.  She is most satisfied with the relief well walking.  Previously prior to the stimulator she was unable to walk upright throughout [facility], and now since the stimulator she reports being able to a walk upright without significant pain.  She is most satisfied with this relief.  She denies any new numbness, weakness or new changes to her bowel bladder function.  She believes the stimulator is doing his job and she is currently satisfied with the results and her pain right now.  She denies any issues with the incision sites nor any problems with the stimulator.      Pain Intervention History:    - s/p L5/S1 LENY on 11/11/20 with about 50% relief of her low back pain.  - s/p L5/S1 LENY on 12/20/21 with 50% relief.  - s/p SCS trial on 05/11/2022 with nearly 100% improvement in her mobility as she reports she is able to stand up straight while ambulating and 85% relief of her back pain and her leg pain  - s/p SCS implant on 5/30/22 with good relief.    HPI:   Libby Estrada is a 87 y.o. female who complains of back pain    Onset: about 8 years  Inciting Event: none  Progression: since onset, pain is gradually improving   Current Pain Score: 5/10  Typical Range: 4-5/10  Timing: constant  Quality: burning, sharp, stabbing   Radiation: yes, down the back of both legs L>R  Associated numbness or weakness: no numbness, no weakness   Exacerbated by: standing, walking, bending  Allievated by: rest, sitting,  medications, laying down.   Is Pain Level Acceptable?:  Yes    Previous Therapies:  PT/OT: yes, in the past  HEP:   Interventions:   Surgery:  Medications:   - NSAIDS:   - MSK Relaxants: baclofen, Robaxin  - TCAs:   - SNRIs:  Cymbalta- did not tolerate  - Topicals:   - Anticonvulsants: gabapentin  - Opioids:  Tramadol    History:    Current Outpatient Medications:     acetaminophen (TYLENOL ARTHRITIS PAIN ORAL), Take by mouth., Disp: , Rfl:     COVID-19 vacc,mRNA,Moderna,/PF (MODERNA COVID-19 VACCINE, EUA, IM), PHARMACY ADMINISTERED, Disp: , Rfl:     dorzolamide-timolol 2-0.5% (COSOPT) 22.3-6.8 mg/mL ophthalmic solution, USE ONE DROP IN EACH EYE TWICE DAILY. (50 DAY SUPPLY PER 10ML), Disp: 30 mL, Rfl: 3    ezetimibe (ZETIA) 10 mg tablet, TAKE ONE TABLET BY MOUTH ONCE DAILY, Disp: 90 tablet, Rfl: 3    gabapentin (NEURONTIN) 300 MG capsule, TAKE ONE CAPSULE BY MOUTH THREE TIMES DAILY, Disp: 90 capsule, Rfl: 2    latanoprost 0.005 % ophthalmic solution, PLACE ONE DROP IN EACH EYE EVERY EVENING, Disp: 2.5 mL, Rfl: 12    MULTIVITAMIN W-MINERALS/LUTEIN (CENTRUM SILVER ORAL), Take by mouth., Disp: , Rfl:     ofloxacin (OCUFLOX) 0.3 % ophthalmic solution, Place 1 drop into the right eye 4 (four) times daily. for 7 days, Disp: 5 mL, Rfl: 0    ondansetron (ZOFRAN-ODT) 4 MG TbDL, Take 1 tablet (4 mg total) by mouth every 8 (eight) hours as needed (Nausea)., Disp: 20 tablet, Rfl: 0    ondansetron (ZOFRAN-ODT) 4 MG TbDL, Take 1 tablet (4 mg total) by mouth every 8 (eight) hours as needed (nausea)., Disp: 20 tablet, Rfl: 0    oxyCODONE-acetaminophen (PERCOCET) 5-325 mg per tablet, Take 1 tablet by mouth every 6 (six) hours as needed for Pain., Disp: 15 tablet, Rfl: 0    prednisoLONE acetate (PRED FORTE) 1 % DrpS, Place 1 drop into the right eye 4 (four) times daily. for 5 days, Disp: 5 mL, Rfl: 1    traMADoL (ULTRAM) 50 mg tablet, Take 1 tablet (50 mg total) by mouth 3 (three) times daily as needed for Pain., Disp:  21 tablet, Rfl: 0    vitamin D 1000 units Tab, Take 1,000 Units by mouth once daily., Disp: , Rfl:     Past Medical History:   Diagnosis Date    Arthritis     Cancer     skin cancer    Cataract     Done OU    Episode of hypertension 12/28/2012    pt states she does not have, Maybe white coat syndrome    GERD (gastroesophageal reflux disease)     Glaucoma     suspect    Hyperlipidemia     Ocular hypertension     Spinal stenosis        Past Surgical History:   Procedure Laterality Date    APPENDECTOMY      CATARACT EXTRACTION W/  INTRAOCULAR LENS IMPLANT Right 07/08/2015    Dr Engel    CATARACT EXTRACTION W/  INTRAOCULAR LENS IMPLANT Left 01/16/2020    Dr Alessandro SONG      EPIDURAL STEROID INJECTION INTO LUMBAR SPINE N/A 11/11/2020    Procedure: Injection-steroid-epidural-lumbar L5/S1;  Surgeon: Cachorro Figueroa MD;  Location: [facility] OR;  Service: Pain Management;  Laterality: N/A;    EPIDURAL STEROID INJECTION INTO LUMBAR SPINE N/A 12/20/2021    Procedure: Injection-steroid-epidural-lumbar L5/S1 to the left;  Surgeon: Cachorro Figueroa MD;  Location: [facility] OR;  Service: Pain Management;  Laterality: N/A;    INSERTION OF DORSAL COLUMN NERVE STIMULATOR FOR TRIAL Bilateral 5/11/2022    Procedure: INSERTION, NEUROSTIMULATOR, SPINAL CORD, DORSAL COLUMN, FOR TRIAL;  Surgeon: Cachorro Figueroa MD;  Location: [facility] OR;  Service: Pain Management;  Laterality: Bilateral;    INSERTION OF SPINAL NEUROSTIMULATOR N/A 5/30/2022    Procedure: INSERTION, NEUROSTIMULATOR, SPINAL;  Surgeon: Cachorro Figueroa MD;  Location: [facility] OR;  Service: Pain Management;  Laterality: N/A;    LUMBAR EPIDURAL INJECTION      MOUTH SURGERY      dental implants    PHACOEMULSIFICATION OF CATARACT Left 1/16/2020    Procedure: PHACOEMULSIFICATION, CATARACT;  Surgeon: Francis Francois Jr., MD;  Location: [facility] OR;  Service: Ophthalmology;  Laterality: Left;  Left    Yag Capsulotomy Right 10/01/2021    Dr Francois       Family  History   Problem Relation Age of Onset    Diabetes Mother     Heart attack Mother     Cancer Maternal Aunt         ovarian    Heart attack Father     Arthritis Father     Heart disease Father          at age 68    Heart attack Sister     Heart attack Brother     Amblyopia Neg Hx     Blindness Neg Hx     Cataracts Neg Hx     Hypertension Neg Hx     Macular degeneration Neg Hx     Retinal detachment Neg Hx     Strabismus Neg Hx     Stroke Neg Hx     Thyroid disease Neg Hx     Glaucoma Neg Hx        Social History     Socioeconomic History    Marital status:    Occupational History    Occupation: retired   Tobacco Use    Smoking status: Never Smoker    Smokeless tobacco: Never Used   Substance and Sexual Activity    Alcohol use: Yes     Alcohol/week: 2.0 standard drinks     Types: 2 Glasses of wine per week    Drug use: No    Sexual activity: Not Currently     Social Determinants of Health     Financial Resource Strain: Low Risk     Difficulty of Paying Living Expenses: Not very hard   Food Insecurity: No Food Insecurity    Worried About Running Out of Food in the Last Year: Never true    Ran Out of Food in the Last Year: Never true   Transportation Needs: No Transportation Needs    Lack of Transportation (Medical): No    Lack of Transportation (Non-Medical): No   Physical Activity: Unknown    Days of Exercise per Week: Patient refused    Minutes of Exercise per Session: 0 min   Stress: Unknown    Feeling of Stress : Patient refused   Social Connections: Unknown    Frequency of Communication with Friends and Family: More than three times a week    Frequency of Social Gatherings with Friends and Family: More than three times a week    Active Member of Clubs or Organizations: Yes    Attends Club or Organization Meetings: More than 4 times per year    Marital Status:    Housing Stability: Low Risk     Unable to Pay for Housing in the Last Year: No    Number of  "Places Lived in the Last Year: 1    Unstable Housing in the Last Year: No       Review of patient's allergies indicates:   Allergen Reactions    Cymbalta [duloxetine] Nausea And Vomiting    Polysporin [bacitracin-polymyxin b] Rash    Statins-hmg-coa reductase inhibitors      "muscles freezing and could not use them"    Codeine Nausea And Vomiting    Gamma immune glob from whey Other (See Comments)     shakes    Lidocaine Itching     Topical lidocaine    Penicillins Hives    Sulfa (sulfonamide antibiotics) Swelling    Bacitracin Rash       Review of Systems:  General ROS: negative for - fever  Psychological ROS: negative for - hostility  Hematological and Lymphatic ROS: negative for - bleeding problems  Endocrine ROS: negative for - unexpected weight changes  Respiratory ROS: no cough, shortness of breath, or wheezing  Cardiovascular ROS: no chest pain or dyspnea on exertion  Gastrointestinal ROS: no abdominal pain, change in bowel habits, or black or bloody stools  Musculoskeletal ROS: negative for - muscular weakness  Neurological ROS: negative for - bowel and bladder control changes or numbness/tingling  Dermatological ROS: negative for rash    Physical Exam:  Vitals:    07/28/22 1023   BP: (!) 170/72   Pulse: 78   SpO2: 96%   Weight: 68.9 kg (152 lb 0.1 oz)   Height: 5' 5" (1.651 m)   PainSc: 0-No pain     Body mass index is 25.3 kg/m².     Gen: NAD, pleasant, well groomed  Gait:  Using Rollator  Psych:  Mood appropriate for given condition  HEENT: eyes anicteric   GI: Abd soft  CV: RRR  Lungs: breathing unlabored   ROM: limited AROM of the L spine in all planes, full ROM at ankles, knees and hips  Lumbar flexion 90 degrees, extension 50 degrees, side bending 30 degrees.    Sensation: intact to light touch in all dermatomes tested from L2-S1 bilaterally  Reflexes: 0/0 b/l patella and 0/0 b/l achilles  Palpation:  -TTP over the b/l greater trochanters and bilateral SI joint  Tone: normal in the b/l knees " and hips   Skin: over her midline lumbar incison no erythema , no redness , no swelling, no signs of infection, no purulence, no tenderness, no induration.    Extremities: No edema in b/l ankles or hands  Provacative tests:        Right Left   L2/3 Iliacus Hip flexion  5  5   L3/4 Qudratus Femoris Knee Extension  5  5   L4/5 Tib Anterior Ankle Dorsiflexion   5  5   L5/S1 Extensor Hallicus Longus Great toe extension  5  5                 S1/S2 Gastroc/Soleus Plantar Flexion  5  5       Imaging:  MRI lumbar spine 5/17/19  FINDINGS:  The conus ends at T12-L1.  No diffuse abnormal marrow or central conus signal is appreciated. No paraspinal fluid collections are appreciated. Osseous alignment appears maintained. There is multilevel facet, ligamentous hypertrophy mid to lower lumbar predominantly similar as well as maintained alignment demonstrating multilevel disc space narrowing, desiccation and marginal anterolisthesis L4-5.  There is Modic endplate degeneration at L2-3 slightly increased overall.  No interval extruded  type fragment is appreciated.     L1-2 demonstrates some broad-based concentric disc bulging, thecal sac triangulation as well as mild lateral recess, inferior neural foraminal narrowing bilaterally.  L2-3 demonstrates broad-based concentric disc bulging with thecal sac triangulation as well as moderate neural foraminal narrowing bilaterally right slightly more so than left.  L3-4 demonstrates broad-based concentric disc bulging with thecal sac triangulation, narrowing overall.  There is some annular fissuring as well as moderate left, moderate to significant right neural foraminal narrowing with some posterior element hypertrophy, spurring.  L4-5 demonstrates broad-based concentric disc bulging with annular fissuring.  There is moderate neural foraminal narrowing demonstrated bilaterally with spinal canal narrowing overall along with exuberant posterior element, ligamentous  hypertrophy.  L5-S1 demonstrates some broad-based concentric disc bulging with annular fissuring and small to moderate central protrusion.  There is significant neural foraminal narrowing demonstrated on the left as well as moderate right.    MRI lumbar spine 12/5/21  FINDINGS:  NOMENCLATURE: Five lumbar type vertebral bodies.     CORD/CAUDA EQUINA: Conus has normal size and signal and ends at a normal level of L1-L2.     ALIGNMENT: Trace retrolisthesis of L1 on L2, L2 on L3 and L3 on L4.  4 mm grade 1 anterolisthesis of L4 on L5.  levoscoliosis with a Segundo angle of 18.1 degrees using the superior T12 and inferior L5 endplates (series 8, image 9).     BONES: Vertebral body heights are maintained.  Multilevel endplate changes, greatest at L2-3 and L5-S1 where there are type 1 endplate changes.  Prominent type 2 endplate changes on the right at L3-4.  Hemangioma in the left T12 pedicle and S1 vertebral body.  No aggressive bone marrow signal.     PARASPINAL AREA: Normal.     LUMBAR DISC LEVELS:     T12-L1: No disc herniation or significant posterior osteophytic ridging.  Ligamentum flavum thickening.  No significant spinal canal or foraminal stenosis.  L1-L2: Trace retrolisthesis.  Mild disc bulge.  Disc height loss.  Ligamentum flavum thickening.  Mild narrowing of the bilateral lateral recesses.  No significant spinal canal stenosis.  Mild bilateral foraminal stenosis.  L2-L3: Trace retrolisthesis.  Mild disc bulge.  Disc height loss.  Ligamentum flavum thickening.  Mild bilateral facet hypertrophy.  Mild right greater than left narrowing of the lateral recesses, minimally increased from prior study.  No significant spinal canal stenosis.  Mild-moderate right and mild left foraminal stenosis.  L3-L4: Trace retrolisthesis.  Mild disc bulge.  Disc height loss.  Bilateral facet hypertrophy.  Ligamentum flavum thickening.  Moderate right and mild-moderate left narrowing of the lateral recesses.  Mild spinal canal  stenosis, minimally decreased from prior study.  Mild-moderate right and mild left foraminal stenosis.  This appears less pronounced on the right compared to prior study.  L4-L5: Anterolisthesis.  Unroofing of the disc and mild disc bulge.  Moderate narrowing of the bilateral lateral recesses.  Severe spinal canal stenosis, unchanged.  Mild bilateral foraminal stenosis, unchanged.  L5-S1: Mild disc bulge, eccentric to the left.  Moderate-marked left and mild right facet hypertrophy.  Ligamentum flavum thickening, greater on the left.  Moderate left and minimal right narrowing of the lateral recesses.  No significant spinal canal stenosis.  Moderate-severe left and minimal right foraminal stenosis.  This is progressed on the left compared to prior study.    Labs:  BMP  Lab Results   Component Value Date     06/19/2022    K 3.9 06/19/2022     06/19/2022    CO2 28 06/19/2022    BUN 12 06/19/2022    CREATININE 0.76 06/19/2022    CALCIUM 9.2 06/19/2022    ANIONGAP 4 (L) 06/19/2022    ESTGFRAFRICA >60 06/19/2022    EGFRNONAA >60 06/19/2022     Lab Results   Component Value Date    ALT 15 06/19/2022    AST 31 06/19/2022    ALKPHOS 91 06/19/2022    BILITOT 0.3 06/19/2022     Lab Results   Component Value Date    WBC 10.33 06/19/2022    HGB 12.6 06/19/2022    HCT 37.4 06/19/2022    MCV 91 06/19/2022     06/19/2022       Assessment:   Problem List Items Addressed This Visit        Neuro    Lumbar radiculopathy - Primary    Relevant Orders    Ambulatory referral/consult to Physical/Occupational Therapy      Other Visit Diagnoses     Chronic pain syndrome        Lumbar spondylosis            87 y.o. year old female presents to the office with back pain.  She's had chronic lower back pain for the past 8 years that has been gradually worsening.      7/28/2022: Libby Estrada returns to the clinic for follow up.  Today she returns for 6 week follow-up after her spinal cord stimulator implant on 05/30/2022. She  continues to have some remaining pain 4-5/10, but overall states his tolerable.  She is most satisfied with the relief well walking.  Previously prior to the stimulator she was unable to walk upright throughout [facility], and now since the stimulator she reports being able to a walk upright without significant pain.  She is most satisfied with this relief.  She denies any new numbness, weakness or new changes to her bowel bladder function.  She believes the stimulator is doing his job and she is currently satisfied with the results and her pain right now.  She denies any issues with the incision sites nor any problems with the stimulator.    - on exam she has full strength.  No signs of infection or erythema at the incision sites.  - overall she is doing well and is satisfied with results of the spinal cord stimulator.  - she reports some intermittent radiation down her right leg however is currently tolerable and she is not interested in repeating any interventional procedures.  - I discussed reaching out to [employer] for potential reprogramming however she is currently satisfied with the relief from the stimulator and does not feel like she needs to be reprogrammed.  - referral given for aquatic physical therapy to improve function and strength, and to receive training towards establishing a safe and effective home exercise program (HEP)  - follow up as needed.      :  Not applicable    This note was completed with dictation software and grammatical errors may exist.

## 2022-08-02 ENCOUNTER — OFFICE VISIT (OUTPATIENT)
Dept: GASTROENTEROLOGY | Facility: CLINIC | Age: 87
End: 2022-08-02
Payer: MEDICARE

## 2022-08-02 VITALS — BODY MASS INDEX: 25.71 KG/M2 | HEIGHT: 65 IN | WEIGHT: 154.31 LBS

## 2022-08-02 DIAGNOSIS — R19.4 CHANGE IN BOWEL HABITS: Primary | ICD-10-CM

## 2022-08-02 DIAGNOSIS — R19.7 DIARRHEA, UNSPECIFIED TYPE: ICD-10-CM

## 2022-08-02 DIAGNOSIS — R93.5 ABNORMAL CT OF THE ABDOMEN: ICD-10-CM

## 2022-08-02 DIAGNOSIS — K57.92 DIVERTICULITIS: ICD-10-CM

## 2022-08-02 PROCEDURE — 99214 PR OFFICE/OUTPT VISIT, EST, LEVL IV, 30-39 MIN: ICD-10-PCS | Mod: S$PBB,,, | Performed by: NURSE PRACTITIONER

## 2022-08-02 PROCEDURE — 99999 PR PBB SHADOW E&M-EST. PATIENT-LVL IV: CPT | Mod: PBBFAC,,, | Performed by: NURSE PRACTITIONER

## 2022-08-02 PROCEDURE — 99214 OFFICE O/P EST MOD 30 MIN: CPT | Mod: PBBFAC,PO | Performed by: NURSE PRACTITIONER

## 2022-08-02 PROCEDURE — 99214 OFFICE O/P EST MOD 30 MIN: CPT | Mod: S$PBB,,, | Performed by: NURSE PRACTITIONER

## 2022-08-02 PROCEDURE — 99999 PR PBB SHADOW E&M-EST. PATIENT-LVL IV: ICD-10-PCS | Mod: PBBFAC,,, | Performed by: NURSE PRACTITIONER

## 2022-08-02 NOTE — PROGRESS NOTES
Subjective:       Patient ID: Libby Estrada is a 87 y.o. female, Body mass index is 25.68 kg/m².    Chief Complaint: Diverticulitis and Diarrhea      Patient is new to me. Referred by Dr. Salas for diverticulitis and diarrhea.     Diarrhea   This is a new problem. The current episode started more than 1 month ago (Started in 5/2022 after taking Doxycycline ). The problem occurs 2 to 4 times per day. The problem has been unchanged. The stool consistency is described as watery (describe stool as type 4, 6 or 7 on bristol scale; denies bloody or black stools). The patient states that diarrhea does not awaken her from sleep. Associated symptoms include abdominal pain (generalized abdominal pain; describes as cramping; occurs prior to bowel movement; denies currently). Pertinent negatives include no bloating, chills, coughing, fever, increased  flatus, vomiting or weight loss. Exacerbated by: eating. Risk factors include recent antibiotic use (denies recent hospitalization or foreign travel). She has tried change of diet and increased fluids (Currently: Lomotil PRN- helps but causes constipation) for the symptoms. There is no history of bowel resection, inflammatory bowel disease, irritable bowel syndrome or a recent abdominal surgery. Treated for acute diverticulitis in 6/2022 with 10 days of Cipro and Flagyl      Review of Systems   Constitutional: Negative for appetite change, chills, fever, unexpected weight change and weight loss.   HENT: Negative for trouble swallowing.    Respiratory: Negative for cough and shortness of breath.    Cardiovascular: Negative for chest pain.   Gastrointestinal: Positive for abdominal pain (generalized abdominal pain; describes as cramping; occurs prior to bowel movement; denies currently) and diarrhea. Negative for abdominal distention, anal bleeding, bloating, blood in stool, constipation, flatus, nausea, rectal pain and vomiting.   Genitourinary: Negative for difficulty urinating  and dysuria.   Musculoskeletal: Positive for gait problem.   Skin: Negative for rash.   Neurological: Negative for speech difficulty.   Psychiatric/Behavioral: Negative for confusion.       Past Medical History:   Diagnosis Date    Arthritis     Cancer     skin cancer    Cataract     Done OU    Episode of hypertension 12/28/2012    pt states she does not have, Maybe white coat syndrome    GERD (gastroesophageal reflux disease)     Glaucoma     suspect    Hyperlipidemia     Ocular hypertension     Spinal stenosis       Past Surgical History:   Procedure Laterality Date    APPENDECTOMY      CATARACT EXTRACTION W/  INTRAOCULAR LENS IMPLANT Right 07/08/2015    Dr Engel    CATARACT EXTRACTION W/  INTRAOCULAR LENS IMPLANT Left 01/16/2020    Dr Alessandro SONG      EPIDURAL STEROID INJECTION INTO LUMBAR SPINE N/A 11/11/2020    Procedure: Injection-steroid-epidural-lumbar L5/S1;  Surgeon: Cachorro Figueroa MD;  Location: [facility] OR;  Service: Pain Management;  Laterality: N/A;    EPIDURAL STEROID INJECTION INTO LUMBAR SPINE N/A 12/20/2021    Procedure: Injection-steroid-epidural-lumbar L5/S1 to the left;  Surgeon: Cachorro Figueroa MD;  Location: [facility] OR;  Service: Pain Management;  Laterality: N/A;    INSERTION OF DORSAL COLUMN NERVE STIMULATOR FOR TRIAL Bilateral 5/11/2022    Procedure: INSERTION, NEUROSTIMULATOR, SPINAL CORD, DORSAL COLUMN, FOR TRIAL;  Surgeon: Cachorro Figueroa MD;  Location: [facility] OR;  Service: Pain Management;  Laterality: Bilateral;    INSERTION OF SPINAL NEUROSTIMULATOR N/A 5/30/2022    Procedure: INSERTION, NEUROSTIMULATOR, SPINAL;  Surgeon: Cachorro Figueroa MD;  Location: [facility] OR;  Service: Pain Management;  Laterality: N/A;    LUMBAR EPIDURAL INJECTION      MOUTH SURGERY      dental implants    PHACOEMULSIFICATION OF CATARACT Left 1/16/2020    Procedure: PHACOEMULSIFICATION, CATARACT;  Surgeon: Francis Francois Jr., MD;  Location: [facility] OR;  Service: Ophthalmology;  Laterality:  Left;  Left    Yag Capsulotomy Right 10/01/2021    Dr Francois      Family History   Problem Relation Age of Onset    Diabetes Mother     Heart attack Mother     Cancer Maternal Aunt         ovarian    Heart attack Father     Arthritis Father     Heart disease Father          at age 68    Heart attack Sister     Heart attack Brother     Amblyopia Neg Hx     Blindness Neg Hx     Cataracts Neg Hx     Hypertension Neg Hx     Macular degeneration Neg Hx     Retinal detachment Neg Hx     Strabismus Neg Hx     Stroke Neg Hx     Thyroid disease Neg Hx     Glaucoma Neg Hx       Wt Readings from Last 10 Encounters:   22 70 kg (154 lb 5.2 oz)   22 68.9 kg (152 lb 0.1 oz)   22 70.9 kg (156 lb 4.9 oz)   22 70.1 kg (154 lb 8.7 oz)   22 70.7 kg (155 lb 12.1 oz)   22 70 kg (154 lb 5.2 oz)   22 70.1 kg (154 lb 10.4 oz)   22 70.2 kg (154 lb 12.2 oz)   22 68 kg (150 lb)   22 70.5 kg (155 lb 8.6 oz)     Lab Results   Component Value Date    WBC 10.33 2022    HGB 12.6 2022    HCT 37.4 2022    MCV 91 2022     2022     CMP  Sodium   Date Value Ref Range Status   2022 136 136 - 145 mmol/L Final   2015 142 137 - 145 MMOL/L Final     Potassium   Date Value Ref Range Status   2022 3.9 3.5 - 5.1 mmol/L Final   2015 4.3 3.5 - 5.1 MMOL/L Final     Chloride   Date Value Ref Range Status   2022 104 95 - 110 mmol/L Final   2015 103 98 - 107 MMOL/L Final     CO2   Date Value Ref Range Status   2022 28 22 - 31 mmol/L Final     Glucose   Date Value Ref Range Status   2022 149 (H) 70 - 110 mg/dL Final     Comment:     The ADA recommends the following guidelines for fasting glucose:    Normal:       less than 100 mg/dL    Prediabetes:  100 mg/dL to 125 mg/dL    Diabetes:     126 mg/dL or higher       BUN   Date Value Ref Range Status   2022 12 7 - 18 mg/dL Final     Creatinine    Date Value Ref Range Status   06/19/2022 0.76 0.50 - 1.40 mg/dL Final   06/25/2015 0.86 0.52 - 1.04 MG/DL Final     Calcium   Date Value Ref Range Status   06/19/2022 9.2 8.4 - 10.2 mg/dL Final     Total Protein   Date Value Ref Range Status   06/19/2022 8.1 6.0 - 8.4 g/dL Final     Albumin   Date Value Ref Range Status   06/19/2022 4.4 3.5 - 5.2 g/dL Final   06/25/2015 3.9 3.5 - 5.0 G/DL Final     Total Bilirubin   Date Value Ref Range Status   06/19/2022 0.3 0.2 - 1.3 mg/dL Final     Alkaline Phosphatase   Date Value Ref Range Status   06/19/2022 91 38 - 145 U/L Final     AST   Date Value Ref Range Status   06/19/2022 31 14 - 36 U/L Final     ALT   Date Value Ref Range Status   06/19/2022 15 0 - 35 U/L Final     Anion Gap   Date Value Ref Range Status   06/19/2022 4 (L) 8 - 16 mmol/L Final     eGFR if    Date Value Ref Range Status   06/19/2022 >60 >60 mL/min/1.73 m^2 Final     eGFR if non    Date Value Ref Range Status   06/19/2022 >60 >60 mL/min/1.73 m^2 Final     Comment:     Calculation used to obtain the estimated glomerular filtration  rate (eGFR) is the CKD-EPI equation.          Lab Results   Component Value Date    LIPASERES 147 06/09/2022      Lab Results   Component Value Date    TSH 0.945 01/11/2022          Reviewed prior medical records including radiology report of CT of abdomen and pelvis 6/19/22 & endoscopy history (see surgical history).     Objective:      Physical Exam  Constitutional:       General: She is not in acute distress.     Appearance: She is well-developed.   HENT:      Head: Normocephalic.      Right Ear: Hearing normal.      Left Ear: Hearing normal.      Nose: Nose normal.      Mouth/Throat:      Comments: Pt wearing mask due to COVID concerns  Eyes:      General: Lids are normal.      Conjunctiva/sclera: Conjunctivae normal.      Pupils: Pupils are equal, round, and reactive to light.   Neck:      Trachea: Trachea normal.   Cardiovascular:       Rate and Rhythm: Normal rate and regular rhythm.      Heart sounds: Normal heart sounds. No murmur heard.  Pulmonary:      Effort: Pulmonary effort is normal. No respiratory distress.      Breath sounds: Normal breath sounds. No stridor. No wheezing.   Abdominal:      General: Bowel sounds are normal. There is no distension.      Palpations: Abdomen is soft. There is no mass.      Tenderness: There is no abdominal tenderness. There is no guarding or rebound.   Musculoskeletal:         General: Normal range of motion.      Cervical back: Normal range of motion.      Comments: Ambulates with walker   Skin:     General: Skin is warm and dry.      Findings: No rash.      Comments: Non jaundiced   Neurological:      Mental Status: She is alert and oriented to person, place, and time.   Psychiatric:         Speech: Speech normal.         Behavior: Behavior normal. Behavior is cooperative.           Assessment:       1. Change in bowel habits    2. Diarrhea, unspecified type    3. Abnormal CT of the abdomen    4. Diverticulitis           Plan:   All diagnoses and orders for this visit:    Change in bowel habits & Diarrhea, unspecified type  - Stool Exam-Ova,Cysts,Parasites; Future; Expected date: 08/02/2022  - Giardia / Cryptosporidum, EIA; Future; Expected date: 08/02/2022  - Stool culture; Future; Expected date: 08/02/2022  - WBC, Stool; Future; Expected date: 08/02/2022  - Occult blood x 1, stool; Future; Expected date: 08/02/2022  - pH, stool; Future; Expected date: 08/02/2022  - Clostridium difficile EIA; Future; Expected date: 08/02/2022  - Recommended increase fiber in diet, especially soluble fiber since this can help bulk up the stool consistency and may help to slow down how fast the stool goes through the colon and can prevent diarrhea  - Start OTC Probiotic once daily  - Ok to take Lomotil PRN sparingly  - Discussed about Colonoscopy, including the risks and benefits of colonoscopy, patient verbalized  understanding but patient declined colonoscopy.    Abnormal CT of the abdomen & Diverticulitis  - CT Abdomen Pelvis  Without Contrast; Future; Expected date: 08/02/2022 (to check for resolution since patient still reports abdominal pain)  - Discussed about Colonoscopy, including the risks and benefits of colonoscopy, patient verbalized understanding but patient declined colonoscopy.  - Discussed the diagnosis of diverticulosis and diverticulitis, advised to continue a low fiber diet for the next 4 weeks and then slowly increase fiber in diet. Advised a low fiber diet is recommended for diverticulitis (for about 4 weeks), but to prevent diverticulitis, high fiber diet is recommended.    If no improvement in symptoms or symptoms worsen, call/follow-up at clinic or go to ER

## 2022-08-10 ENCOUNTER — TELEPHONE (OUTPATIENT)
Dept: GASTROENTEROLOGY | Facility: CLINIC | Age: 87
End: 2022-08-10
Payer: MEDICARE

## 2022-08-10 ENCOUNTER — HOSPITAL ENCOUNTER (OUTPATIENT)
Dept: RADIOLOGY | Facility: HOSPITAL | Age: 87
Discharge: HOME | End: 2022-08-10
Attending: NURSE PRACTITIONER
Payer: MEDICARE

## 2022-08-10 DIAGNOSIS — K57.92 DIVERTICULITIS: Primary | ICD-10-CM

## 2022-08-10 DIAGNOSIS — K57.92 DIVERTICULITIS: ICD-10-CM

## 2022-08-10 PROCEDURE — 74176 CT ABDOMEN PELVIS WITHOUT CONTRAST: ICD-10-PCS | Mod: 26,,, | Performed by: RADIOLOGY

## 2022-08-10 PROCEDURE — A9698 NON-RAD CONTRAST MATERIALNOC: HCPCS | Mod: PO | Performed by: NURSE PRACTITIONER

## 2022-08-10 PROCEDURE — 74176 CT ABD & PELVIS W/O CONTRAST: CPT | Mod: TC,PO

## 2022-08-10 PROCEDURE — 74176 CT ABD & PELVIS W/O CONTRAST: CPT | Mod: 26,,, | Performed by: RADIOLOGY

## 2022-08-10 PROCEDURE — 25500020 PHARM REV CODE 255: Mod: PO | Performed by: NURSE PRACTITIONER

## 2022-08-10 RX ORDER — METRONIDAZOLE 500 MG/1
500 TABLET ORAL EVERY 8 HOURS
Qty: 42 TABLET | Refills: 0 | Status: SHIPPED | OUTPATIENT
Start: 2022-08-10 | End: 2022-08-24

## 2022-08-10 RX ORDER — CIPROFLOXACIN 500 MG/1
500 TABLET ORAL EVERY 12 HOURS
Qty: 28 TABLET | Refills: 0 | OUTPATIENT
Start: 2022-08-10 | End: 2022-08-13

## 2022-08-10 RX ADMIN — IOHEXOL 1000 ML: 9 SOLUTION ORAL at 10:08

## 2022-08-10 NOTE — TELEPHONE ENCOUNTER
Let patient know her CT of abdomen and pelvis showed evidence of mild sigmoid colon diverticulitis, lung nodule, and lumbar degenerative disc disease. Recommend a course of antibiotics (Cipro and Flagyl sent to pharmacy) for further management of diverticulitis and follow-up with PCP for further management of lung nodule. Please schedule follow-up appointment with me in 4-6 weeks.

## 2022-08-10 NOTE — TELEPHONE ENCOUNTER
Called and notified pt of results and antibiotics sent to pharmacy. Pt scheduled for follow-up visit.  Pt verbalized understanding to all

## 2022-08-13 ENCOUNTER — NURSE TRIAGE (OUTPATIENT)
Dept: ADMINISTRATIVE | Facility: CLINIC | Age: 87
End: 2022-08-13
Payer: MEDICARE

## 2022-08-13 NOTE — TELEPHONE ENCOUNTER
"On antbx, 3rd round, for diverticulitis. Bilat ankle swelling, Rt>Lt. Reddened area approx 4-5" from ankle area, up into calf, that is itchy and warm touch. Had similar rash in the past but disappeared the next day. Currently on cipro and flagyl. Pt has taken them before.  At advice of pharmacist, pt also began ultraflora spectrum, which she has also been taking since the 11th. This is new to her.   Care advice provided per protocol, with recommendation to see MD within 4 hrs call. Pt VU.      Reason for Disposition   [1] Red area or streak [2] large (> 2 in. or 5 cm)    Additional Information   Negative: Difficulty breathing   Negative: Entire foot is cool or blue in comparison to other side   Negative: Fever   Negative: Patient sounds very sick or weak to the triager   Negative: [1] SEVERE pain (e.g., excruciating, unable to walk) AND [2] not improved after 2 hours of pain medicine   Negative: [1] Can't move swollen ankle at all AND [2] no fever   Negative: [1] Redness AND [2] painful when touched AND [3] no fever    Protocols used: ANKLE SWELLING-A-AH      "

## 2022-08-15 ENCOUNTER — TELEPHONE (OUTPATIENT)
Dept: FAMILY MEDICINE | Facility: CLINIC | Age: 87
End: 2022-08-15
Payer: MEDICARE

## 2022-08-15 NOTE — TELEPHONE ENCOUNTER
----- Message from Cisco Keith sent at 8/15/2022 10:35 AM CDT -----  Contact: Self  Type:  Sooner Appointment Request    Caller is requesting a sooner appointment.  Caller declined first available appointment listed below.  Caller will not accept being placed on the waitlist and is requesting a message be sent to doctor.    Name of Caller:  Patient  When is the first available appointment?  N/a  Symptoms:  ED follow up, break out on legs, feet and ankle swelling  Best Call Back Number:  [phone]   Additional Information:   Directed by ED to see Dr ASAP (w/in 3d)

## 2022-08-16 ENCOUNTER — OFFICE VISIT (OUTPATIENT)
Dept: FAMILY MEDICINE | Facility: CLINIC | Age: 87
End: 2022-08-16
Payer: MEDICARE

## 2022-08-16 VITALS
BODY MASS INDEX: 25.78 KG/M2 | DIASTOLIC BLOOD PRESSURE: 80 MMHG | HEART RATE: 91 BPM | SYSTOLIC BLOOD PRESSURE: 130 MMHG | HEIGHT: 65 IN | OXYGEN SATURATION: 97 % | WEIGHT: 154.75 LBS

## 2022-08-16 DIAGNOSIS — R21 RASH: Primary | ICD-10-CM

## 2022-08-16 PROCEDURE — 99214 OFFICE O/P EST MOD 30 MIN: CPT | Mod: PBBFAC,PO | Performed by: INTERNAL MEDICINE

## 2022-08-16 PROCEDURE — 99213 PR OFFICE/OUTPT VISIT, EST, LEVL III, 20-29 MIN: ICD-10-PCS | Mod: S$PBB,,, | Performed by: INTERNAL MEDICINE

## 2022-08-16 PROCEDURE — 99999 PR PBB SHADOW E&M-EST. PATIENT-LVL IV: ICD-10-PCS | Mod: PBBFAC,,, | Performed by: INTERNAL MEDICINE

## 2022-08-16 PROCEDURE — 99999 PR PBB SHADOW E&M-EST. PATIENT-LVL IV: CPT | Mod: PBBFAC,,, | Performed by: INTERNAL MEDICINE

## 2022-08-16 PROCEDURE — 99213 OFFICE O/P EST LOW 20 MIN: CPT | Mod: S$PBB,,, | Performed by: INTERNAL MEDICINE

## 2022-08-16 NOTE — PROGRESS NOTES
Patient ID: Libby Estrada is a 87 y.o. female.    Chief Complaint: er follow up     Here with daughter for ER follow up      Diagnosis HPI A/P   Rash and swelling of bilateral lower extremities.  Diverticulitis treated with cipro and flagyl in . Seen by GI and CT showed mild inflammatory changes from diverticulitis in sigmoid. GI repeated cipro flagyl 8/10. Rash on bilateral lower extremity with swelling started . ER changed cipro to flagyl. Rash has improved. Lower extr US normal. Renal function stable She was exposed twice to cipro. Rash has improved some since stopping cipro (see ER picture)  This is likely allergy to cipro.  Will monitor for resolution and I recommend leg elevation.       Diagnosis and Orders  Rash        Review of Systems   Constitutional: Negative for fever.   Respiratory: Negative for shortness of breath.    Cardiovascular: Negative for chest pain.   Gastrointestinal: Negative for abdominal pain.     Wt Readings from Last 3 Encounters:   22 1424 70.2 kg (154 lb 12.2 oz)   22 1303 70 kg (154 lb 5.2 oz)   22 1039 70 kg (154 lb 5.2 oz)      Body mass index is 25.75 kg/m².         Hyperlipidemia Medications             ezetimibe (ZETIA) 10 mg tablet TAKE ONE TABLET BY MOUTH ONCE DAILY             Vitals:    22 1424   BP: 130/80   Pulse: 91     Physical Exam  Cardiovascular:      Comments: BLE foot swelling   Musculoskeletal:      Right lower le+ Edema present.      Left lower le+ Edema present.   Skin:     Findings: Rash present.      Comments: Blanchable flat petechial rash bilateral ankles.          I personally reviewed past medical, family and social history.  Medication List with Changes/Refills   Current Medications    ACETAMINOPHEN (TYLENOL ARTHRITIS PAIN ORAL)    Take by mouth.    CEFDINIR (OMNICEF) 300 MG CAPSULE    Take 1 capsule (300 mg total) by mouth 2 (two) times daily. for 10 days    DORZOLAMIDE-TIMOLOL 2-0.5% (COSOPT) 22.3-6.8 MG/ML  OPHTHALMIC SOLUTION    USE ONE DROP IN EACH EYE TWICE DAILY. (50 DAY SUPPLY PER 10ML)    EZETIMIBE (ZETIA) 10 MG TABLET    TAKE ONE TABLET BY MOUTH ONCE DAILY    GABAPENTIN (NEURONTIN) 300 MG CAPSULE    TAKE ONE CAPSULE BY MOUTH THREE TIMES DAILY    LACTOBACILLUS RHAMNOSUS GG (CULTURELLE) 10 BILLION CELL CAPSULE    Take 1 capsule by mouth once daily.    LATANOPROST 0.005 % OPHTHALMIC SOLUTION    PLACE ONE DROP IN EACH EYE EVERY EVENING    METRONIDAZOLE (FLAGYL) 500 MG TABLET    Take 1 tablet (500 mg total) by mouth every 8 (eight) hours. for 14 days    MULTIVITAMIN W-MINERALS/LUTEIN (CENTRUM SILVER ORAL)    Take by mouth.    ONDANSETRON (ZOFRAN-ODT) 4 MG TBDL    Take 1 tablet (4 mg total) by mouth every 8 (eight) hours as needed (Nausea).    ONDANSETRON (ZOFRAN-ODT) 4 MG TBDL    Take 1 tablet (4 mg total) by mouth every 8 (eight) hours as needed (nausea).    OXYCODONE-ACETAMINOPHEN (PERCOCET) 5-325 MG PER TABLET    Take 1 tablet by mouth every 6 (six) hours as needed for Pain.    TRAMADOL (ULTRAM) 50 MG TABLET    Take 1 tablet (50 mg total) by mouth 3 (three) times daily as needed for Pain.    VITAMIN D 1000 UNITS TAB    Take 1,000 Units by mouth once daily.

## 2022-08-17 ENCOUNTER — TELEPHONE (OUTPATIENT)
Dept: GASTROENTEROLOGY | Facility: CLINIC | Age: 87
End: 2022-08-17
Payer: MEDICARE

## 2022-08-17 DIAGNOSIS — A49.8 CLOSTRIDIUM DIFFICILE INFECTION: Primary | ICD-10-CM

## 2022-08-17 RX ORDER — VANCOMYCIN HYDROCHLORIDE 125 MG/1
125 CAPSULE ORAL 4 TIMES DAILY
Qty: 40 CAPSULE | Refills: 0 | Status: SHIPPED | OUTPATIENT
Start: 2022-08-17 | End: 2022-08-27

## 2022-08-17 NOTE — TELEPHONE ENCOUNTER
Called and notified pt of results and recommendations. Advised her to start Vancomycin after finishing antibiotics for diverticulitis. Pt verbalized understanding to all.

## 2022-08-17 NOTE — TELEPHONE ENCOUNTER
Please let patient know one of the stool studies has come back positive for C. Diff. This is an organism that usually presents after taking an antibiotic when the good bacteria are not present and the bad bacteria take over. I recommend to avoid anti-motility medications (such as lomotil, Pepto or imodium), take florastor probiotic once daily and a course of antibiotics- Vancomycin 125 mg QID x 10 days (tell her to start after finishing antibiotics for diverticulitis please). Instruct on good handwashing to prevent spread of bacteria. We will notify her when the remaining stool studies are resulted.

## 2022-08-19 ENCOUNTER — PATIENT MESSAGE (OUTPATIENT)
Dept: GASTROENTEROLOGY | Facility: CLINIC | Age: 87
End: 2022-08-19
Payer: MEDICARE

## 2022-08-23 ENCOUNTER — TELEPHONE (OUTPATIENT)
Dept: GASTROENTEROLOGY | Facility: CLINIC | Age: 87
End: 2022-08-23
Payer: MEDICARE

## 2022-08-23 DIAGNOSIS — R19.5 YEAST IN STOOL: Primary | ICD-10-CM

## 2022-08-23 RX ORDER — NYSTATIN 500000 [USP'U]/1
500000 TABLET, COATED ORAL EVERY 8 HOURS
Qty: 21 TABLET | Refills: 0 | Status: SHIPPED | OUTPATIENT
Start: 2022-08-23 | End: 2022-08-30

## 2022-08-23 NOTE — TELEPHONE ENCOUNTER
Let patient know her remaining stool studies showed yeast in stool; otherwise, negative/normal results. I sent a Rx to her pharmacy for the yeast in stool (nystatin).

## 2022-08-29 ENCOUNTER — PATIENT MESSAGE (OUTPATIENT)
Dept: ADMINISTRATIVE | Facility: HOSPITAL | Age: 87
End: 2022-08-29
Payer: MEDICARE

## 2022-08-31 DIAGNOSIS — Z78.0 MENOPAUSE: ICD-10-CM

## 2022-09-15 ENCOUNTER — LAB VISIT (OUTPATIENT)
Dept: LAB | Facility: HOSPITAL | Age: 87
End: 2022-09-15
Attending: FAMILY MEDICINE
Payer: MEDICARE

## 2022-09-15 DIAGNOSIS — R73.03 PREDIABETES: ICD-10-CM

## 2022-09-15 DIAGNOSIS — E78.5 HYPERLIPIDEMIA, UNSPECIFIED HYPERLIPIDEMIA TYPE: ICD-10-CM

## 2022-09-15 LAB
ALBUMIN SERPL BCP-MCNC: 4 G/DL (ref 3.5–5.2)
ALP SERPL-CCNC: 55 U/L (ref 55–135)
ALT SERPL W/O P-5'-P-CCNC: 10 U/L (ref 10–44)
ANION GAP SERPL CALC-SCNC: 8 MMOL/L (ref 8–16)
AST SERPL-CCNC: 18 U/L (ref 10–40)
BILIRUB SERPL-MCNC: 0.4 MG/DL (ref 0.1–1)
BUN SERPL-MCNC: 10 MG/DL (ref 8–23)
CALCIUM SERPL-MCNC: 9.7 MG/DL (ref 8.7–10.5)
CHLORIDE SERPL-SCNC: 104 MMOL/L (ref 95–110)
CHOLEST SERPL-MCNC: 214 MG/DL (ref 120–199)
CHOLEST/HDLC SERPL: 3.5 {RATIO} (ref 2–5)
CO2 SERPL-SCNC: 26 MMOL/L (ref 23–29)
CREAT SERPL-MCNC: 0.8 MG/DL (ref 0.5–1.4)
ERYTHROCYTE [DISTWIDTH] IN BLOOD BY AUTOMATED COUNT: 13.4 % (ref 11.5–14.5)
EST. GFR  (NO RACE VARIABLE): >60 ML/MIN/1.73 M^2
ESTIMATED AVG GLUCOSE: 123 MG/DL (ref 68–131)
GLUCOSE SERPL-MCNC: 111 MG/DL (ref 70–110)
HBA1C MFR BLD: 5.9 % (ref 4–5.6)
HCT VFR BLD AUTO: 38.5 % (ref 37–48.5)
HDLC SERPL-MCNC: 61 MG/DL (ref 40–75)
HDLC SERPL: 28.5 % (ref 20–50)
HGB BLD-MCNC: 12.3 G/DL (ref 12–16)
LDLC SERPL CALC-MCNC: 130.6 MG/DL (ref 63–159)
MCH RBC QN AUTO: 28.9 PG (ref 27–31)
MCHC RBC AUTO-ENTMCNC: 31.9 G/DL (ref 32–36)
MCV RBC AUTO: 91 FL (ref 82–98)
NONHDLC SERPL-MCNC: 153 MG/DL
PLATELET # BLD AUTO: 281 K/UL (ref 150–450)
PMV BLD AUTO: 10 FL (ref 9.2–12.9)
POTASSIUM SERPL-SCNC: 4.3 MMOL/L (ref 3.5–5.1)
PROT SERPL-MCNC: 7.6 G/DL (ref 6–8.4)
RBC # BLD AUTO: 4.25 M/UL (ref 4–5.4)
SODIUM SERPL-SCNC: 138 MMOL/L (ref 136–145)
TRIGL SERPL-MCNC: 112 MG/DL (ref 30–150)
TSH SERPL DL<=0.005 MIU/L-ACNC: 0.72 UIU/ML (ref 0.4–4)
WBC # BLD AUTO: 5.16 K/UL (ref 3.9–12.7)

## 2022-09-15 PROCEDURE — 80061 LIPID PANEL: CPT | Performed by: FAMILY MEDICINE

## 2022-09-15 PROCEDURE — 84443 ASSAY THYROID STIM HORMONE: CPT | Performed by: FAMILY MEDICINE

## 2022-09-15 PROCEDURE — 83036 HEMOGLOBIN GLYCOSYLATED A1C: CPT | Performed by: FAMILY MEDICINE

## 2022-09-15 PROCEDURE — 80053 COMPREHEN METABOLIC PANEL: CPT | Performed by: FAMILY MEDICINE

## 2022-09-15 PROCEDURE — 36415 COLL VENOUS BLD VENIPUNCTURE: CPT | Mod: PO | Performed by: FAMILY MEDICINE

## 2022-09-15 PROCEDURE — 85027 COMPLETE CBC AUTOMATED: CPT | Performed by: FAMILY MEDICINE

## 2022-09-20 ENCOUNTER — OFFICE VISIT (OUTPATIENT)
Dept: GASTROENTEROLOGY | Facility: CLINIC | Age: 87
End: 2022-09-20
Payer: MEDICARE

## 2022-09-20 VITALS — WEIGHT: 151.44 LBS | BODY MASS INDEX: 25.23 KG/M2 | HEIGHT: 65 IN

## 2022-09-20 DIAGNOSIS — Z86.19 HISTORY OF CLOSTRIDIUM DIFFICILE INFECTION: ICD-10-CM

## 2022-09-20 DIAGNOSIS — Z87.898 HISTORY OF DIARRHEA: Primary | ICD-10-CM

## 2022-09-20 DIAGNOSIS — Z87.19 HISTORY OF DIVERTICULITIS OF COLON: ICD-10-CM

## 2022-09-20 DIAGNOSIS — K57.90 DIVERTICULOSIS: ICD-10-CM

## 2022-09-20 PROCEDURE — 99213 PR OFFICE/OUTPT VISIT, EST, LEVL III, 20-29 MIN: ICD-10-PCS | Mod: S$PBB,,, | Performed by: NURSE PRACTITIONER

## 2022-09-20 PROCEDURE — 99213 OFFICE O/P EST LOW 20 MIN: CPT | Mod: S$PBB,,, | Performed by: NURSE PRACTITIONER

## 2022-09-20 PROCEDURE — 99213 OFFICE O/P EST LOW 20 MIN: CPT | Mod: PBBFAC,PO | Performed by: NURSE PRACTITIONER

## 2022-09-20 PROCEDURE — 99999 PR PBB SHADOW E&M-EST. PATIENT-LVL III: CPT | Mod: PBBFAC,,, | Performed by: NURSE PRACTITIONER

## 2022-09-20 PROCEDURE — 99999 PR PBB SHADOW E&M-EST. PATIENT-LVL III: ICD-10-PCS | Mod: PBBFAC,,, | Performed by: NURSE PRACTITIONER

## 2022-09-20 NOTE — PROGRESS NOTES
Subjective:       Patient ID: Libby Estrada is a 87 y.o. female, Body mass index is 25.2 kg/m².    Chief Complaint: Follow-up      Established patient of myself. Former patient of Dr. Kumar.     Diarrhea   This is a new problem. The current episode started more than 1 month ago. The problem occurs less than 2 times per day (1 time per day). The problem has been resolved (denies diarrhea since completing Vancomycin). The patient states that diarrhea does not awaken her from sleep. Pertinent negatives include no abdominal pain (Resolved since completing Flagyl and Omnicef), bloating, chills, coughing, fever, increased  flatus, vomiting or weight loss. Exacerbated by: eating. Risk factors include recent antibiotic use. She has tried change of diet and increased fluids (Past: Nystatin for yeast in stool; Flagyl and Omnicef for diverticulitis; Vancomycin for C Diff positive on 8/16/22- symptoms resolved) for the symptoms. The treatment provided significant relief. There is no history of bowel resection, inflammatory bowel disease, irritable bowel syndrome or a recent abdominal surgery.   Review of Systems   Constitutional:  Negative for appetite change, chills, fever, unexpected weight change and weight loss.   HENT:  Negative for trouble swallowing.    Respiratory:  Negative for cough and shortness of breath.    Cardiovascular:  Negative for chest pain.   Gastrointestinal:  Positive for diarrhea. Negative for abdominal distention, abdominal pain (Resolved since completing Flagyl and Omnicef), anal bleeding, bloating, blood in stool, constipation, flatus, nausea, rectal pain and vomiting.   Genitourinary:  Negative for difficulty urinating and dysuria.   Musculoskeletal:  Positive for back pain and gait problem.   Skin:  Negative for rash.   Neurological:  Negative for speech difficulty.   Psychiatric/Behavioral:  Negative for confusion.      Past Medical History:   Diagnosis Date    Arthritis     Cancer     skin  cancer    Cataract     Done OU    Episode of hypertension 2012    pt states she does not have, Maybe white coat syndrome    GERD (gastroesophageal reflux disease)     Glaucoma     suspect    Hyperlipidemia     Ocular hypertension     Spinal stenosis       Past Surgical History:   Procedure Laterality Date    APPENDECTOMY      CATARACT EXTRACTION W/  INTRAOCULAR LENS IMPLANT Right 2015    Dr Engel    CATARACT EXTRACTION W/  INTRAOCULAR LENS IMPLANT Left 2020    Dr Francois    COLONOSCOPY      EPIDURAL STEROID INJECTION INTO LUMBAR SPINE N/A 2020    Procedure: Injection-steroid-epidural-lumbar L5/S1;  Surgeon: Cachorro Figueroa MD;  Location: [facility] OR;  Service: Pain Management;  Laterality: N/A;    EPIDURAL STEROID INJECTION INTO LUMBAR SPINE N/A 2021    Procedure: Injection-steroid-epidural-lumbar L5/S1 to the left;  Surgeon: Cachorro Figueroa MD;  Location: [facility] OR;  Service: Pain Management;  Laterality: N/A;    INSERTION OF DORSAL COLUMN NERVE STIMULATOR FOR TRIAL Bilateral 2022    Procedure: INSERTION, NEUROSTIMULATOR, SPINAL CORD, DORSAL COLUMN, FOR TRIAL;  Surgeon: Cachorro Figueroa MD;  Location: [facility] OR;  Service: Pain Management;  Laterality: Bilateral;    INSERTION OF SPINAL NEUROSTIMULATOR N/A 2022    Procedure: INSERTION, NEUROSTIMULATOR, SPINAL;  Surgeon: Cachorro Figueroa MD;  Location: [facility] OR;  Service: Pain Management;  Laterality: N/A;    LUMBAR EPIDURAL INJECTION      MOUTH SURGERY      dental implants    PHACOEMULSIFICATION OF CATARACT Left 2020    Procedure: PHACOEMULSIFICATION, CATARACT;  Surgeon: Francis Francois Jr., MD;  Location: [facility] OR;  Service: Ophthalmology;  Laterality: Left;  Left    Yag Capsulotomy Right 10/01/2021    Dr Francois      Family History   Problem Relation Age of Onset    Diabetes Mother     Heart attack Mother     Cancer Maternal Aunt         ovarian    Heart attack Father     Arthritis Father     Heart disease Father           at age 68    Heart attack Sister     Heart attack Brother     Amblyopia Neg Hx     Blindness Neg Hx     Cataracts Neg Hx     Hypertension Neg Hx     Macular degeneration Neg Hx     Retinal detachment Neg Hx     Strabismus Neg Hx     Stroke Neg Hx     Thyroid disease Neg Hx     Glaucoma Neg Hx       Wt Readings from Last 10 Encounters:   09/20/22 68.7 kg (151 lb 7.3 oz)   08/16/22 70.2 kg (154 lb 12.2 oz)   08/13/22 70 kg (154 lb 5.2 oz)   08/02/22 70 kg (154 lb 5.2 oz)   07/28/22 68.9 kg (152 lb 0.1 oz)   06/23/22 70.9 kg (156 lb 4.9 oz)   06/19/22 70.1 kg (154 lb 8.7 oz)   06/16/22 70.7 kg (155 lb 12.1 oz)   06/09/22 70 kg (154 lb 5.2 oz)   06/09/22 70.1 kg (154 lb 10.4 oz)     Lab Results   Component Value Date    WBC 5.16 09/15/2022    HGB 12.3 09/15/2022    HCT 38.5 09/15/2022    MCV 91 09/15/2022     09/15/2022     CMP  Sodium   Date Value Ref Range Status   09/15/2022 138 136 - 145 mmol/L Final   06/25/2015 142 137 - 145 MMOL/L Final     Potassium   Date Value Ref Range Status   09/15/2022 4.3 3.5 - 5.1 mmol/L Final   06/25/2015 4.3 3.5 - 5.1 MMOL/L Final     Chloride   Date Value Ref Range Status   09/15/2022 104 95 - 110 mmol/L Final   06/25/2015 103 98 - 107 MMOL/L Final     CO2   Date Value Ref Range Status   09/15/2022 26 23 - 29 mmol/L Final     Glucose   Date Value Ref Range Status   09/15/2022 111 (H) 70 - 110 mg/dL Final     BUN   Date Value Ref Range Status   09/15/2022 10 8 - 23 mg/dL Final     Creatinine   Date Value Ref Range Status   09/15/2022 0.8 0.5 - 1.4 mg/dL Final   06/25/2015 0.86 0.52 - 1.04 MG/DL Final     Calcium   Date Value Ref Range Status   09/15/2022 9.7 8.7 - 10.5 mg/dL Final     Total Protein   Date Value Ref Range Status   09/15/2022 7.6 6.0 - 8.4 g/dL Final     Albumin   Date Value Ref Range Status   09/15/2022 4.0 3.5 - 5.2 g/dL Final   06/25/2015 3.9 3.5 - 5.0 G/DL Final     Total Bilirubin   Date Value Ref Range Status   09/15/2022 0.4 0.1 - 1.0 mg/dL Final      Comment:     For infants and newborns, interpretation of results should be based  on gestational age, weight and in agreement with clinical  observations.    Premature Infant recommended reference ranges:  Up to 24 hours.............<8.0 mg/dL  Up to 48 hours............<12.0 mg/dL  3-5 days..................<15.0 mg/dL  6-29 days.................<15.0 mg/dL       Alkaline Phosphatase   Date Value Ref Range Status   09/15/2022 55 55 - 135 U/L Final     AST   Date Value Ref Range Status   09/15/2022 18 10 - 40 U/L Final     ALT   Date Value Ref Range Status   09/15/2022 10 10 - 44 U/L Final     Anion Gap   Date Value Ref Range Status   09/15/2022 8 8 - 16 mmol/L Final     eGFR if    Date Value Ref Range Status   06/19/2022 >60 >60 mL/min/1.73 m^2 Final     eGFR if non    Date Value Ref Range Status   06/19/2022 >60 >60 mL/min/1.73 m^2 Final     Comment:     Calculation used to obtain the estimated glomerular filtration  rate (eGFR) is the CKD-EPI equation.          Lab Results   Component Value Date    LIPASERES 147 06/09/2022             Reviewed prior medical records including radiology report of CT of abdomen and pelvis 8/10/22 & endoscopy history (see surgical history).     Objective:      Physical Exam  Constitutional:       General: She is not in acute distress.     Appearance: She is well-developed.   HENT:      Head: Normocephalic.      Right Ear: Hearing normal.      Left Ear: Hearing normal.      Nose: Nose normal.      Mouth/Throat:      Comments: Pt wearing mask due to COVID concerns  Eyes:      General: Lids are normal.      Conjunctiva/sclera: Conjunctivae normal.      Pupils: Pupils are equal, round, and reactive to light.   Neck:      Trachea: Trachea normal.   Cardiovascular:      Rate and Rhythm: Normal rate and regular rhythm.      Heart sounds: Normal heart sounds. No murmur heard.  Pulmonary:      Effort: Pulmonary effort is normal. No respiratory distress.       Breath sounds: Normal breath sounds. No stridor. No wheezing.   Abdominal:      General: Bowel sounds are normal. There is no distension.      Palpations: Abdomen is soft. There is no mass.      Tenderness: There is no abdominal tenderness. There is no guarding or rebound.   Musculoskeletal:         General: Normal range of motion.      Cervical back: Normal range of motion.      Comments: Ambulates with walker   Skin:     General: Skin is warm and dry.      Findings: No rash.      Comments: Non jaundiced   Neurological:      Mental Status: She is alert and oriented to person, place, and time.   Psychiatric:         Speech: Speech normal.         Behavior: Behavior normal. Behavior is cooperative.         Assessment:       1. History of diarrhea    2. History of Clostridium difficile infection    3. History of diverticulitis of colon    4. Diverticulosis           Plan:   All diagnoses and orders for this visit:    History of diarrhea & History of Clostridium difficile infection   - Resolved   - Recommended increase fiber in diet, especially soluble fiber since this can help bulk up the stool consistency and may help to slow down how fast the stool goes through the colon and can prevent diarrhea    - Instructed patient to take OTC Probiotic anytime taking antibiotics, patient verbalized understanding    History of diverticulitis of colon & Diverticulosis   - Resolved   - Discussed colonoscopy but patient would like to hold off at this time   - Recommend high fiber diet (20-30 grams of fiber daily)/OTC fiber supplements daily as directed.     If no improvement in symptoms or symptoms worsen, call/follow-up at clinic or go to ER

## 2022-09-29 ENCOUNTER — OFFICE VISIT (OUTPATIENT)
Dept: FAMILY MEDICINE | Facility: CLINIC | Age: 87
End: 2022-09-29
Payer: MEDICARE

## 2022-09-29 VITALS
BODY MASS INDEX: 25.38 KG/M2 | OXYGEN SATURATION: 97 % | HEART RATE: 89 BPM | DIASTOLIC BLOOD PRESSURE: 82 MMHG | HEIGHT: 65 IN | SYSTOLIC BLOOD PRESSURE: 120 MMHG | WEIGHT: 152.31 LBS

## 2022-09-29 DIAGNOSIS — E78.5 HYPERLIPIDEMIA, UNSPECIFIED HYPERLIPIDEMIA TYPE: Primary | ICD-10-CM

## 2022-09-29 DIAGNOSIS — M51.37 DEGENERATION OF INTERVERTEBRAL DISC OF LUMBOSACRAL REGION: ICD-10-CM

## 2022-09-29 DIAGNOSIS — M54.16 LUMBAR RADICULOPATHY: ICD-10-CM

## 2022-09-29 DIAGNOSIS — D22.9 BENIGN NEVUS: ICD-10-CM

## 2022-09-29 DIAGNOSIS — R73.03 PREDIABETES: ICD-10-CM

## 2022-09-29 PROCEDURE — 99214 PR OFFICE/OUTPT VISIT, EST, LEVL IV, 30-39 MIN: ICD-10-PCS | Mod: S$PBB,,, | Performed by: FAMILY MEDICINE

## 2022-09-29 PROCEDURE — 99999 PR PBB SHADOW E&M-EST. PATIENT-LVL III: CPT | Mod: PBBFAC,,, | Performed by: FAMILY MEDICINE

## 2022-09-29 PROCEDURE — 99999 PR PBB SHADOW E&M-EST. PATIENT-LVL III: ICD-10-PCS | Mod: PBBFAC,,, | Performed by: FAMILY MEDICINE

## 2022-09-29 PROCEDURE — 99214 OFFICE O/P EST MOD 30 MIN: CPT | Mod: S$PBB,,, | Performed by: FAMILY MEDICINE

## 2022-09-29 PROCEDURE — 99213 OFFICE O/P EST LOW 20 MIN: CPT | Mod: PBBFAC,PO | Performed by: FAMILY MEDICINE

## 2022-09-29 RX ORDER — GABAPENTIN 300 MG/1
300 CAPSULE ORAL 3 TIMES DAILY
Qty: 90 CAPSULE | Refills: 5 | Status: SHIPPED | OUTPATIENT
Start: 2022-09-29 | End: 2023-03-01

## 2022-09-29 NOTE — PROGRESS NOTES
Subjective:       Patient ID: Libby Estrada is a 87 y.o. female.    Chief Complaint: Follow-up (Results/Check spot on left side of neck)    Here for f/u lipid and chronic issues.   New issue of spot on left neck she would like looked at.  Worse mobility over last few years and interested in scooter.  Ambulates with walker and still difficulty with daily life.    Follow-up  Pertinent negatives include no chest pain, chills, coughing or fever.   Review of Systems   Constitutional:  Negative for chills and fever.   Respiratory:  Negative for cough, chest tightness and shortness of breath.    Cardiovascular:  Negative for chest pain, palpitations and leg swelling.   Endocrine: Negative for cold intolerance and heat intolerance.   Psychiatric/Behavioral:  Negative for decreased concentration. The patient is not nervous/anxious.      Objective:      Physical Exam  Vitals and nursing note reviewed.   Constitutional:       Appearance: She is well-developed.   HENT:      Head: Normocephalic and atraumatic.   Cardiovascular:      Rate and Rhythm: Normal rate and regular rhythm.      Heart sounds: Normal heart sounds.   Pulmonary:      Effort: Pulmonary effort is normal.      Breath sounds: Normal breath sounds.   Psychiatric:         Mood and Affect: Mood normal.         Behavior: Behavior normal.       Assessment:       1. Hyperlipidemia, unspecified hyperlipidemia type    2. Prediabetes    3. Lumbar radiculopathy    4. Degeneration of intervertebral disc of lumbosacral region    5. Benign nevus        Plan:       Hyperlipidemia, unspecified hyperlipidemia type  -     CBC Without Differential; Future; Expected date: 09/29/2022  -     Comprehensive Metabolic Panel; Future; Expected date: 09/29/2022  -     Lipid Panel; Future; Expected date: 09/29/2022  -     TSH; Future; Expected date: 09/29/2022    Prediabetes  -     CBC Without Differential; Future; Expected date: 09/29/2022  -     Comprehensive Metabolic Panel; Future;  Expected date: 09/29/2022  -     Lipid Panel; Future; Expected date: 09/29/2022  -     TSH; Future; Expected date: 09/29/2022  -     Hemoglobin A1C; Future; Expected date: 09/29/2022    Lumbar radiculopathy  -     Ambulatory referral/consult to Physical/Occupational Therapy; Future; Expected date: 10/06/2022    Degeneration of intervertebral disc of lumbosacral region  -     Ambulatory referral/consult to Physical/Occupational Therapy; Future; Expected date: 10/06/2022    Benign nevus    Other orders  -     gabapentin (NEURONTIN) 300 MG capsule; Take 1 capsule (300 mg total) by mouth 3 (three) times daily.  Dispense: 90 capsule; Refill: 5    Labs stable and reviewed  Lipid stable  Monitor mole  Refill meds  Follow up in about 6 months (around 3/29/2023), or if symptoms worsen or fail to improve.

## 2022-10-03 DIAGNOSIS — Z71.89 COMPLEX CARE COORDINATION: ICD-10-CM

## 2023-02-15 ENCOUNTER — OFFICE VISIT (OUTPATIENT)
Dept: OPHTHALMOLOGY | Facility: CLINIC | Age: 88
End: 2023-02-15
Payer: MEDICARE

## 2023-02-15 DIAGNOSIS — H52.7 REFRACTIVE ERROR: ICD-10-CM

## 2023-02-15 DIAGNOSIS — Z96.1 PSEUDOPHAKIA: ICD-10-CM

## 2023-02-15 DIAGNOSIS — H40.053 BORDERLINE GLAUCOMA OF BOTH EYES WITH OCULAR HYPERTENSION: Primary | ICD-10-CM

## 2023-02-15 PROCEDURE — 99213 OFFICE O/P EST LOW 20 MIN: CPT | Mod: PBBFAC,PO | Performed by: OPHTHALMOLOGY

## 2023-02-15 PROCEDURE — 99999 PR PBB SHADOW E&M-EST. PATIENT-LVL III: CPT | Mod: PBBFAC,,, | Performed by: OPHTHALMOLOGY

## 2023-02-15 PROCEDURE — 99999 PR PBB SHADOW E&M-EST. PATIENT-LVL III: ICD-10-PCS | Mod: PBBFAC,,, | Performed by: OPHTHALMOLOGY

## 2023-02-15 PROCEDURE — 99214 OFFICE O/P EST MOD 30 MIN: CPT | Mod: S$PBB,,, | Performed by: OPHTHALMOLOGY

## 2023-02-15 PROCEDURE — 99214 PR OFFICE/OUTPT VISIT, EST, LEVL IV, 30-39 MIN: ICD-10-PCS | Mod: S$PBB,,, | Performed by: OPHTHALMOLOGY

## 2023-02-15 NOTE — PROGRESS NOTES
HPI     Glaucoma     Additional comments: IOP ck           Comments    DLS: 6/22/22    Pt states doing well with dist va. Uses gls mostly for near.     Gtts: Latanoprost QHS, Dorzolamide-timolol BID OU          Last edited by Cheryle Quintana on 2/15/2023  2:48 PM.        ROS    Negative for: Constitutional, Gastrointestinal, Neurological, Skin,   Genitourinary, Musculoskeletal, HENT, Endocrine, Cardiovascular, Eyes,   Respiratory, Psychiatric, Allergic/Imm, Heme/Lymph  Last edited by Francis Francois Jr., MD on 2/15/2023  3:36 PM.        Assessment /Plan     For exam results, see Encounter Report.    Borderline glaucoma of both eyes with ocular hypertension    Pseudophakia    Refractive error    Borderline glaucoma of both eyes with ocular hypertension  IOP OD 18 OS 13, increased IOP OD, complaint with drops  Continue to monitor  Continue latanoprost QHS OU and Cosopt BID OU  No follow-ups on file.  Pseudophakia  Stable observe  Refractive error  Rx for glasses given to patient

## 2023-03-21 ENCOUNTER — LAB VISIT (OUTPATIENT)
Dept: LAB | Facility: HOSPITAL | Age: 88
End: 2023-03-21
Attending: FAMILY MEDICINE
Payer: MEDICARE

## 2023-03-21 DIAGNOSIS — R73.03 PREDIABETES: ICD-10-CM

## 2023-03-21 DIAGNOSIS — E78.5 HYPERLIPIDEMIA, UNSPECIFIED HYPERLIPIDEMIA TYPE: ICD-10-CM

## 2023-03-21 LAB
ALBUMIN SERPL BCP-MCNC: 4.1 G/DL (ref 3.5–5.2)
ALP SERPL-CCNC: 63 U/L (ref 55–135)
ALT SERPL W/O P-5'-P-CCNC: 13 U/L (ref 10–44)
ANION GAP SERPL CALC-SCNC: 9 MMOL/L (ref 8–16)
AST SERPL-CCNC: 18 U/L (ref 10–40)
BILIRUB SERPL-MCNC: 0.4 MG/DL (ref 0.1–1)
BUN SERPL-MCNC: 11 MG/DL (ref 8–23)
CALCIUM SERPL-MCNC: 9.9 MG/DL (ref 8.7–10.5)
CHLORIDE SERPL-SCNC: 101 MMOL/L (ref 95–110)
CHOLEST SERPL-MCNC: 200 MG/DL (ref 120–199)
CHOLEST/HDLC SERPL: 3.2 {RATIO} (ref 2–5)
CO2 SERPL-SCNC: 27 MMOL/L (ref 23–29)
CREAT SERPL-MCNC: 0.8 MG/DL (ref 0.5–1.4)
ERYTHROCYTE [DISTWIDTH] IN BLOOD BY AUTOMATED COUNT: 12.6 % (ref 11.5–14.5)
EST. GFR  (NO RACE VARIABLE): >60 ML/MIN/1.73 M^2
ESTIMATED AVG GLUCOSE: 123 MG/DL (ref 68–131)
GLUCOSE SERPL-MCNC: 106 MG/DL (ref 70–110)
HBA1C MFR BLD: 5.9 % (ref 4–5.6)
HCT VFR BLD AUTO: 39.7 % (ref 37–48.5)
HDLC SERPL-MCNC: 63 MG/DL (ref 40–75)
HDLC SERPL: 31.5 % (ref 20–50)
HGB BLD-MCNC: 12.6 G/DL (ref 12–16)
LDLC SERPL CALC-MCNC: 106.8 MG/DL (ref 63–159)
MCH RBC QN AUTO: 29.6 PG (ref 27–31)
MCHC RBC AUTO-ENTMCNC: 31.7 G/DL (ref 32–36)
MCV RBC AUTO: 93 FL (ref 82–98)
NONHDLC SERPL-MCNC: 137 MG/DL
PLATELET # BLD AUTO: 253 K/UL (ref 150–450)
PMV BLD AUTO: 9.4 FL (ref 9.2–12.9)
POTASSIUM SERPL-SCNC: 4.2 MMOL/L (ref 3.5–5.1)
PROT SERPL-MCNC: 7.7 G/DL (ref 6–8.4)
RBC # BLD AUTO: 4.25 M/UL (ref 4–5.4)
SODIUM SERPL-SCNC: 137 MMOL/L (ref 136–145)
TRIGL SERPL-MCNC: 151 MG/DL (ref 30–150)
TSH SERPL DL<=0.005 MIU/L-ACNC: 0.84 UIU/ML (ref 0.4–4)
WBC # BLD AUTO: 4.68 K/UL (ref 3.9–12.7)

## 2023-03-21 PROCEDURE — 85027 COMPLETE CBC AUTOMATED: CPT | Performed by: FAMILY MEDICINE

## 2023-03-21 PROCEDURE — 80053 COMPREHEN METABOLIC PANEL: CPT | Performed by: FAMILY MEDICINE

## 2023-03-21 PROCEDURE — 83036 HEMOGLOBIN GLYCOSYLATED A1C: CPT | Performed by: FAMILY MEDICINE

## 2023-03-21 PROCEDURE — 36415 COLL VENOUS BLD VENIPUNCTURE: CPT | Mod: PO | Performed by: FAMILY MEDICINE

## 2023-03-21 PROCEDURE — 80061 LIPID PANEL: CPT | Performed by: FAMILY MEDICINE

## 2023-03-21 PROCEDURE — 84443 ASSAY THYROID STIM HORMONE: CPT | Performed by: FAMILY MEDICINE

## 2023-03-24 ENCOUNTER — TELEPHONE (OUTPATIENT)
Dept: FAMILY MEDICINE | Facility: CLINIC | Age: 88
End: 2023-03-24
Payer: MEDICARE

## 2023-03-24 NOTE — TELEPHONE ENCOUNTER
Called and left a message  to contact the office. Need  to reschedule appointment due to provider out.

## 2023-04-22 PROBLEM — R10.9 ABDOMINAL PAIN: Status: ACTIVE | Noted: 2023-04-22

## 2023-04-22 PROBLEM — I95.9 HYPOTENSION: Status: ACTIVE | Noted: 2023-04-22

## 2023-04-22 PROBLEM — N18.9 CKD (CHRONIC KIDNEY DISEASE): Status: ACTIVE | Noted: 2023-04-22

## 2023-04-22 PROBLEM — E87.20 LACTIC ACIDOSIS: Status: ACTIVE | Noted: 2023-04-22

## 2023-04-22 PROBLEM — K57.92 ACUTE DIVERTICULITIS: Status: ACTIVE | Noted: 2023-04-22

## 2023-04-22 PROBLEM — R55 SYNCOPE: Status: ACTIVE | Noted: 2023-04-22

## 2023-04-22 PROBLEM — R11.2 NAUSEA AND VOMITING: Status: ACTIVE | Noted: 2023-04-22

## 2023-04-22 PROBLEM — K52.9 COLITIS: Status: ACTIVE | Noted: 2023-04-22

## 2023-04-22 PROBLEM — R40.0 DROWSINESS: Status: ACTIVE | Noted: 2023-04-22

## 2023-04-26 PROBLEM — R19.7 DIARRHEA: Status: ACTIVE | Noted: 2023-04-26

## 2023-04-28 ENCOUNTER — OUTPATIENT CASE MANAGEMENT (OUTPATIENT)
Dept: ADMINISTRATIVE | Facility: OTHER | Age: 88
End: 2023-04-28
Payer: MEDICARE

## 2023-04-28 NOTE — LETTER
May 1, 2023             Dear Ms. Thomasmelba,    Welcome to [facility] Complex Care Management Program.  It was a pleasure talking with you today.  My name is Lorna Mccormack, and I look forward to being your Care Manager.  My goal is to help you function at the healthiest and highest level possible.  You can contact me directly at [phone].    As an [facility] patient, some of the services we may be able to provide include:      Development of an individualized care plan with a Registered Nurse    Connection with a    Connection with available resources and services     Coordinate communication among your care team members    Provide coaching and education    Help you understand your doctors treatment plan   Help you obtain information about your insurance coverage.     All services provided by [facility]s Complex Care Managers and other care team members are coordinated with and communicated to your primary care team.      As part of your enrollment, you will be receiving education materials and more information about these services in your [facility] account, by phone or through the mail.  If you do not wish to participate or receive information, please contact our office at [phone].      Sincerely,        Lorna Mccormack, RN  [facility]   Out-patient RN Complex Care Manager

## 2023-04-28 NOTE — PROGRESS NOTES
Outpatient Care Management  Initial Patient Assessment    Patient: Libby Estrada  MRN: [id number]  Date of Service: 04/28/2023  Completed by: Lorna Mccormack RN  Referral Date: 04/24/2023  Program: High Risk  Status: Ongoing  Effective Dates: 4/28/2023 - present  Responsible Staff: Lorna Mccormack RN        Reason for Visit   Patient presents with    OPCM Enrollment Call    Nursing Assessment       Brief Summary:  Libby Estrada was referred by Dr. Veronica for acute diverticulitis. Patient qualifies for program based on high risk score of 63.1%.   Active problem list, medical, surgical and social history reviewed. Active comorbidities include dyslipidemia, pre-DM, diverticulosis, chronic back pain with spinal neuro-stimulator followed by Dr. Figueroa. Areas of need identified by patient include Diverticulitis.   Next steps:   Mrs. Estrada agrees to OPCM RN follow up on or around 5/8/23.  Identify areas where behavior change may lead to improved health.  Consider follow up with Manuel, NP, Gastro.  Mail education on colitis; on diverticulosis, diverticulitis, and colitis.  Review education on follow up call.  As on previous note: [facility] SASE sent to provide copy of advanced directives when obtained to place in EMR.    Disability Status  Hearing Difficulty or Deaf: no  Visual Difficulty or Blind: yes  Vision Management: Other (pre-glaucoma; s/p cataracts; Rx glasses)  Difficulty Concentrating, Remembering or Making Decisions: no  Communication Difficulty: no  Eating/Swallowing Difficulty: no  Walking or Climbing Stairs Difficulty: yes  Walking or Climbing Stairs: ambulation difficulty, requires equipment; stair climbing difficulty, requires equipment  Mobility Management: rollator, electric scooter  Dressing/Bathing Difficulty: no  Difficulty Managing Errands Independently: yes  Errands Management: Daughter assists  Equipment Currently Used at Home: rollator; power chair  Change in Functional Status Since Onset of  Current Illness/Injury: no        Spiritual Beliefs  Spiritual, Cultural Beliefs, Evangelical Practices, Values that Affect Care: no      Social History     Socioeconomic History    Marital status:    Occupational History    Occupation: retired   Tobacco Use    Smoking status: Never    Smokeless tobacco: Never   Substance and Sexual Activity    Alcohol use: Yes     Comment: socially    Drug use: No    Sexual activity: Not Currently     Social Determinants of Health     Financial Resource Strain: Low Risk     Difficulty of Paying Living Expenses: Not very hard   Food Insecurity: No Food Insecurity    Worried About Running Out of Food in the Last Year: Never true    Ran Out of Food in the Last Year: Never true   Transportation Needs: No Transportation Needs    Lack of Transportation (Medical): No    Lack of Transportation (Non-Medical): No   Physical Activity: Inactive    Days of Exercise per Week: 5 days    Minutes of Exercise per Session: 0 min   Stress: No Stress Concern Present    Feeling of Stress : Not at all   Social Connections: Unknown    Frequency of Communication with Friends and Family: More than three times a week    Frequency of Social Gatherings with Friends and Family: More than three times a week    Active Member of Clubs or Organizations: Yes    Attends Club or Organization Meetings: More than 4 times per year    Marital Status:    Housing Stability: Low Risk     Unable to Pay for Housing in the Last Year: No    Number of Places Lived in the Last Year: 1    Unstable Housing in the Last Year: No       Roles and Relationships  Primary Source of Support/Comfort: child(rubi)  Name of Support/Comfort Primary Source: Karin Leung  Primary Roles/Responsibilities: retired      Advance Directives (For Healthcare)  Advance Directive  (If Adv Dir status is received, view document under Adv Dir in header or Chart Review Media tab): Patient has advance directive, copy not received.  Patient Requests  Assistance: Other (Comment)        Patient Reported Insurance  Verified current insurance plan:: Medicare        Depression Patient Health Questionnaire 4/28/2023 8/16/2022 7/8/2021 8/8/2018 3/14/2017 3/9/2016   Over the last two weeks how often have you been bothered by little interest or pleasure in doing things Not at all Not at all Not at all Not at all Not at all Not at all   Over the last two weeks how often have you been bothered by feeling down, depressed or hopeless Not at all Not at all Not at all Not at all Not at all Not at all   PHQ-2 Total Score 0 0 0 0 0 0       Learning Assessment       04/22/2023 0459 [facility] (4/21/2023 - 4/27/2023)   Created by Bhupinder Sousa RN - RN (Nurse) Status: Complete                 PRIMARY LEARNER     Primary Learner Name:  Libby Estrada BARBRA - 04/22/2023 0459    Relationship:  Patient BARBRA - 04/22/2023 0459    Does the primary learner have any barriers to learning?:  No Barriers JP - 04/22/2023 0459    What is the preferred language of the primary learner?:  English JP - 04/22/2023 0459    Is an  required?:  No JP - 04/22/2023 0459    How does the primary learner prefer to learn new concepts?:  Listening JP - 04/22/2023 0459    How often do you need to have someone help you read instructions, pamphlets, or written material from your doctor or pharmacy?:  Never JP - 04/22/2023 0459        CO-LEARNER #1     No question answered        CO-LEARNER #2     No question answered        SPECIAL TOPICS     No question answered        ANSWERED BY:     No question answered        Edit History       Bhupinder Sousa, RN - RN (Nurse)   04/22/2023 0459

## 2023-05-02 ENCOUNTER — PATIENT OUTREACH (OUTPATIENT)
Dept: ADMINISTRATIVE | Facility: OTHER | Age: 88
End: 2023-05-02
Payer: MEDICARE

## 2023-05-02 NOTE — PROGRESS NOTES
This Community Health Worker (CHW) completed Social Determinant of Health Questionnaire with patient via telephone today.    Notified OPCM CM Lorna GURROLA RN, of completion.  Pt identified barrier of most importance are: N/A  Referrals to community agencies completed with patient/caregiver consent outside of [facility] include: N/A  Referrals sent via [facility]: N/A  Support and Services:  N/A  Other patient needs/wants help with:  N/A    Follow up indicated:  N/A

## 2023-05-03 DIAGNOSIS — Z71.89 COMPLEX CARE COORDINATION: ICD-10-CM

## 2023-05-04 ENCOUNTER — OUTPATIENT CASE MANAGEMENT (OUTPATIENT)
Dept: ADMINISTRATIVE | Facility: OTHER | Age: 88
End: 2023-05-04
Payer: MEDICARE

## 2023-05-04 NOTE — PROGRESS NOTES
5/4/23 OPCM RN received message from Ms. Estrada that she had a nurse scheduled on her calendar to see her today at 0900 and no one has showed up.  OPCM RN called [facility] and spoke with Josey.  Josey reported nurse Yesenia is scheduled to see her tomorrow.  She agreed to contact Ms. Estrada for follow up.    OPCM RN reached out to Ms. Estrada and she confirmed Josey contacted her regarding nursing scheduled for tomorrow. She noted that she had Yesenia scheduled for tomorrow on her calendar as well and she must have gotten mixed up.  She denied any other needs/questions/concerns at this time.

## 2023-05-09 ENCOUNTER — OUTPATIENT CASE MANAGEMENT (OUTPATIENT)
Dept: ADMINISTRATIVE | Facility: OTHER | Age: 88
End: 2023-05-09
Payer: MEDICARE

## 2023-05-09 NOTE — PROGRESS NOTES
Outpatient Care Management  Plan of Care Follow Up Visit    Patient: Libby Estrada  MRN: [id number]  Date of Service: 05/09/2023  Completed by: Lorna Mccormack RN  Referral Date: 04/24/2023    Reason for Visit   Patient presents with    OPCM RN Follow Up Call       Brief Summary: OPCM RN follow up completed today.  Next Steps:   Mrs. Estrada agreed to OPCM RN follow up on or around 5/26/23.  Support patient and family/caregiver active participation in decision-making and self-management plan.  Encourage patient to eat at regular intervals, to increase intake of fluids and soluble fiber that may include applesauce, peas, skin-free potatoes and smooth nut butters.

## 2023-05-17 ENCOUNTER — OFFICE VISIT (OUTPATIENT)
Dept: FAMILY MEDICINE | Facility: CLINIC | Age: 88
End: 2023-05-17
Payer: MEDICARE

## 2023-05-17 VITALS
BODY MASS INDEX: 24.61 KG/M2 | HEIGHT: 65 IN | OXYGEN SATURATION: 98 % | WEIGHT: 147.69 LBS | DIASTOLIC BLOOD PRESSURE: 84 MMHG | HEART RATE: 84 BPM | SYSTOLIC BLOOD PRESSURE: 121 MMHG

## 2023-05-17 DIAGNOSIS — I10 PRIMARY HYPERTENSION: Primary | ICD-10-CM

## 2023-05-17 DIAGNOSIS — M51.37 DEGENERATION OF INTERVERTEBRAL DISC OF LUMBOSACRAL REGION: ICD-10-CM

## 2023-05-17 DIAGNOSIS — E78.5 HYPERLIPIDEMIA, UNSPECIFIED HYPERLIPIDEMIA TYPE: ICD-10-CM

## 2023-05-17 PROBLEM — R19.7 DIARRHEA: Status: RESOLVED | Noted: 2023-04-26 | Resolved: 2023-05-17

## 2023-05-17 PROBLEM — K52.9 COLITIS: Status: RESOLVED | Noted: 2023-04-22 | Resolved: 2023-05-17

## 2023-05-17 PROBLEM — R10.9 ABDOMINAL PAIN: Status: RESOLVED | Noted: 2023-04-22 | Resolved: 2023-05-17

## 2023-05-17 PROBLEM — K57.92 ACUTE DIVERTICULITIS: Status: RESOLVED | Noted: 2023-04-22 | Resolved: 2023-05-17

## 2023-05-17 PROBLEM — R11.2 NAUSEA AND VOMITING: Status: RESOLVED | Noted: 2023-04-22 | Resolved: 2023-05-17

## 2023-05-17 PROBLEM — R55 SYNCOPE: Status: RESOLVED | Noted: 2023-04-22 | Resolved: 2023-05-17

## 2023-05-17 PROBLEM — I95.9 HYPOTENSION: Status: RESOLVED | Noted: 2023-04-22 | Resolved: 2023-05-17

## 2023-05-17 PROBLEM — R40.0 DROWSINESS: Status: RESOLVED | Noted: 2023-04-22 | Resolved: 2023-05-17

## 2023-05-17 PROCEDURE — 99214 OFFICE O/P EST MOD 30 MIN: CPT | Mod: S$PBB,,, | Performed by: FAMILY MEDICINE

## 2023-05-17 PROCEDURE — 99999 PR PBB SHADOW E&M-EST. PATIENT-LVL III: ICD-10-PCS | Mod: PBBFAC,,, | Performed by: FAMILY MEDICINE

## 2023-05-17 PROCEDURE — 99999 PR PBB SHADOW E&M-EST. PATIENT-LVL III: CPT | Mod: PBBFAC,,, | Performed by: FAMILY MEDICINE

## 2023-05-17 PROCEDURE — 99214 PR OFFICE/OUTPT VISIT, EST, LEVL IV, 30-39 MIN: ICD-10-PCS | Mod: S$PBB,,, | Performed by: FAMILY MEDICINE

## 2023-05-17 PROCEDURE — 99213 OFFICE O/P EST LOW 20 MIN: CPT | Mod: PBBFAC,PO | Performed by: FAMILY MEDICINE

## 2023-05-17 RX ORDER — GABAPENTIN 100 MG/1
100 CAPSULE ORAL 3 TIMES DAILY
Qty: 90 CAPSULE | Refills: 5 | Status: SHIPPED | OUTPATIENT
Start: 2023-05-17 | End: 2023-11-16

## 2023-05-17 RX ORDER — METOPROLOL TARTRATE 25 MG/1
25 TABLET, FILM COATED ORAL 2 TIMES DAILY
Qty: 180 TABLET | Refills: 3 | Status: SHIPPED | OUTPATIENT
Start: 2023-05-17 | End: 2023-11-16 | Stop reason: SDUPTHER

## 2023-05-17 NOTE — PROGRESS NOTES
Subjective:       Patient ID: Libby Estrada is a 88 y.o. female.    Chief Complaint: Colitis (Hospital stay for 6 days)    Here for hospital f/u.  Had htn and started beta blockers.  Abd issues resolved.  Doing well overall.   Tid gabapentin causing sedation during day.    Review of Systems   Constitutional:  Negative for activity change, chills, fever and unexpected weight change.   HENT:  Negative for hearing loss, rhinorrhea and trouble swallowing.    Eyes:  Negative for discharge and visual disturbance.   Respiratory:  Negative for cough, chest tightness, shortness of breath and wheezing.    Cardiovascular:  Negative for chest pain, palpitations and leg swelling.   Gastrointestinal:  Negative for constipation.   Endocrine: Negative for cold intolerance, heat intolerance, polydipsia and polyuria.   Genitourinary:  Negative for difficulty urinating, dysuria, hematuria and menstrual problem.   Musculoskeletal:  Positive for back pain. Negative for arthralgias, joint swelling and neck pain.   Neurological:  Negative for weakness and headaches.   Psychiatric/Behavioral:  Negative for confusion, decreased concentration and dysphoric mood. The patient is not nervous/anxious.      Objective:      Physical Exam  Vitals and nursing note reviewed.   Constitutional:       Appearance: She is well-developed.   HENT:      Head: Normocephalic and atraumatic.   Cardiovascular:      Rate and Rhythm: Normal rate and regular rhythm.      Heart sounds: Normal heart sounds.   Pulmonary:      Effort: Pulmonary effort is normal.      Breath sounds: Normal breath sounds.   Psychiatric:         Mood and Affect: Mood normal.         Behavior: Behavior normal.       Assessment:       1. Primary hypertension    2. Hyperlipidemia, unspecified hyperlipidemia type    3. Degeneration of intervertebral disc of lumbosacral region        Plan:       Primary hypertension  -     CBC Without Differential; Future; Expected date: 05/17/2023  -      Comprehensive Metabolic Panel; Future; Expected date: 05/17/2023    Hyperlipidemia, unspecified hyperlipidemia type    Degeneration of intervertebral disc of lumbosacral region    Other orders  -     metoprolol tartrate (LOPRESSOR) 25 MG tablet; Take 1 tablet (25 mg total) by mouth 2 (two) times daily.  Dispense: 180 tablet; Refill: 3  -     gabapentin (NEURONTIN) 100 MG capsule; Take 1 capsule (100 mg total) by mouth 3 (three) times daily.  Dispense: 90 capsule; Refill: 5      Htn stable stable  Labs 2 wks  Lipid stable  Discussed gabapentin dosing can try 100 mg bid and 300 mg qhs  Will monitor chronic medical issues and continue current plan of care.      Follow up in about 6 months (around 11/17/2023), or if symptoms worsen or fail to improve.

## 2023-05-29 ENCOUNTER — OUTPATIENT CASE MANAGEMENT (OUTPATIENT)
Dept: ADMINISTRATIVE | Facility: OTHER | Age: 88
End: 2023-05-29
Payer: MEDICARE

## 2023-06-01 ENCOUNTER — LAB VISIT (OUTPATIENT)
Dept: LAB | Facility: HOSPITAL | Age: 88
End: 2023-06-01
Attending: FAMILY MEDICINE
Payer: MEDICARE

## 2023-06-01 DIAGNOSIS — I10 PRIMARY HYPERTENSION: ICD-10-CM

## 2023-06-01 LAB
ALBUMIN SERPL BCP-MCNC: 4 G/DL (ref 3.5–5.2)
ALP SERPL-CCNC: 66 U/L (ref 55–135)
ALT SERPL W/O P-5'-P-CCNC: 10 U/L (ref 10–44)
ANION GAP SERPL CALC-SCNC: 8 MMOL/L (ref 8–16)
AST SERPL-CCNC: 16 U/L (ref 10–40)
BILIRUB SERPL-MCNC: 0.3 MG/DL (ref 0.1–1)
BUN SERPL-MCNC: 13 MG/DL (ref 8–23)
CALCIUM SERPL-MCNC: 9.6 MG/DL (ref 8.7–10.5)
CHLORIDE SERPL-SCNC: 101 MMOL/L (ref 95–110)
CO2 SERPL-SCNC: 27 MMOL/L (ref 23–29)
CREAT SERPL-MCNC: 0.8 MG/DL (ref 0.5–1.4)
ERYTHROCYTE [DISTWIDTH] IN BLOOD BY AUTOMATED COUNT: 12.5 % (ref 11.5–14.5)
EST. GFR  (NO RACE VARIABLE): >60 ML/MIN/1.73 M^2
GLUCOSE SERPL-MCNC: 84 MG/DL (ref 70–110)
HCT VFR BLD AUTO: 35.2 % (ref 37–48.5)
HGB BLD-MCNC: 11.7 G/DL (ref 12–16)
MCH RBC QN AUTO: 30 PG (ref 27–31)
MCHC RBC AUTO-ENTMCNC: 33.2 G/DL (ref 32–36)
MCV RBC AUTO: 90 FL (ref 82–98)
PLATELET # BLD AUTO: 217 K/UL (ref 150–450)
PMV BLD AUTO: 9.1 FL (ref 9.2–12.9)
POTASSIUM SERPL-SCNC: 4.3 MMOL/L (ref 3.5–5.1)
PROT SERPL-MCNC: 7.2 G/DL (ref 6–8.4)
RBC # BLD AUTO: 3.9 M/UL (ref 4–5.4)
SODIUM SERPL-SCNC: 136 MMOL/L (ref 136–145)
WBC # BLD AUTO: 6.95 K/UL (ref 3.9–12.7)

## 2023-06-01 PROCEDURE — 80053 COMPREHEN METABOLIC PANEL: CPT | Mod: PO | Performed by: FAMILY MEDICINE

## 2023-06-01 PROCEDURE — 36415 COLL VENOUS BLD VENIPUNCTURE: CPT | Mod: PO | Performed by: FAMILY MEDICINE

## 2023-06-01 PROCEDURE — 85027 COMPLETE CBC AUTOMATED: CPT | Mod: PO | Performed by: FAMILY MEDICINE

## 2023-06-09 ENCOUNTER — PATIENT MESSAGE (OUTPATIENT)
Dept: ADMINISTRATIVE | Facility: OTHER | Age: 88
End: 2023-06-09
Payer: MEDICARE

## 2023-06-09 ENCOUNTER — OUTPATIENT CASE MANAGEMENT (OUTPATIENT)
Dept: ADMINISTRATIVE | Facility: OTHER | Age: 88
End: 2023-06-09
Payer: MEDICARE

## 2023-06-09 NOTE — PROGRESS NOTES
6/9/2023 1st attempt to complete Follow-Up  for Outpatient Care Management, left message.  Will send via portal unable to assess letter.

## 2023-06-26 ENCOUNTER — PATIENT MESSAGE (OUTPATIENT)
Dept: OPTOMETRY | Facility: CLINIC | Age: 88
End: 2023-06-26
Payer: MEDICARE

## 2023-07-11 ENCOUNTER — OUTPATIENT CASE MANAGEMENT (OUTPATIENT)
Dept: ADMINISTRATIVE | Facility: OTHER | Age: 88
End: 2023-07-11
Payer: MEDICARE

## 2023-07-11 NOTE — PROGRESS NOTES
07/10/23 - Unable to reach after multiple attempts; left message with OPCM RN contact information; Case closure.

## 2023-07-14 DIAGNOSIS — H40.053 BORDERLINE GLAUCOMA OF BOTH EYES WITH OCULAR HYPERTENSION: ICD-10-CM

## 2023-07-14 RX ORDER — LATANOPROST 50 UG/ML
SOLUTION/ DROPS OPHTHALMIC
Qty: 2.5 ML | Refills: 11 | Status: SHIPPED | OUTPATIENT
Start: 2023-07-14 | End: 2024-07-13

## 2023-08-01 NOTE — PROGRESS NOTES
24-2 sitafast hvf OU done today for Dr. Engel.   k   Is the patient currently in the state of [place]? YES    Visit mode:VIDEO    If the visit is dropped, the patient can be reconnected by: VIDEO VISIT: Text to cell phone: [phone]    Will anyone else be joining the visit? NO      How would you like to obtain your AVS? MyChart    Are changes needed to the allergy or medication list? YES: taking B12 now    Reason for visit: RECHECK and Video Visit

## 2023-08-16 ENCOUNTER — OFFICE VISIT (OUTPATIENT)
Dept: OPTOMETRY | Facility: CLINIC | Age: 88
End: 2023-08-16
Payer: MEDICARE

## 2023-08-16 DIAGNOSIS — H43.393 VITREOUS FLOATERS, BILATERAL: ICD-10-CM

## 2023-08-16 DIAGNOSIS — Z96.1 BILATERAL PSEUDOPHAKIA: ICD-10-CM

## 2023-08-16 DIAGNOSIS — H40.1111 PRIMARY OPEN ANGLE GLAUCOMA (POAG) OF RIGHT EYE, MILD STAGE: Primary | ICD-10-CM

## 2023-08-16 PROCEDURE — 99999 PR PBB SHADOW E&M-EST. PATIENT-LVL III: ICD-10-PCS | Mod: PBBFAC,,, | Performed by: OPTOMETRIST

## 2023-08-16 PROCEDURE — 99213 OFFICE O/P EST LOW 20 MIN: CPT | Mod: PBBFAC,PO | Performed by: OPTOMETRIST

## 2023-08-16 PROCEDURE — 92133 CPTRZD OPH DX IMG PST SGM ON: CPT | Mod: PBBFAC,PO | Performed by: OPTOMETRIST

## 2023-08-16 PROCEDURE — 99214 PR OFFICE/OUTPT VISIT, EST, LEVL IV, 30-39 MIN: ICD-10-PCS | Mod: S$PBB,,, | Performed by: OPTOMETRIST

## 2023-08-16 PROCEDURE — 99999 PR PBB SHADOW E&M-EST. PATIENT-LVL III: CPT | Mod: PBBFAC,,, | Performed by: OPTOMETRIST

## 2023-08-16 PROCEDURE — 99214 OFFICE O/P EST MOD 30 MIN: CPT | Mod: S$PBB,,, | Performed by: OPTOMETRIST

## 2023-08-16 PROCEDURE — 92133 POSTERIOR SEGMENT OCT OPTIC NERVE(OCULAR COHERENCE TOMOGRAPHY) - OU - BOTH EYES: ICD-10-PCS | Mod: 26,S$PBB,, | Performed by: OPTOMETRIST

## 2023-08-21 NOTE — PROGRESS NOTES
HPI    6 month dfe and oct-dle-2/23 alessandra    Pt complains of some blurred va OS. Denies any new flashes or floaters.   States she has occasional diplopia-comes and goes. Using gtts as   directed-Gtts: Latanoprost QHS, Dorzolamide-timolol BID OU  Last edited by Inga Osorio on 8/16/2023  3:46 PM.            Assessment /Plan     For exam results, see Encounter Report.    Primary open angle glaucoma (POAG) of right eye, mild stage  -     Posterior Segment OCT Optic Nerve- Both eyes  -     Garcia Visual Field - OU - Extended - Both Eyes; Future    Vitreous floaters, bilateral    Bilateral pseudophakia      Longstanding tx for Oc HTN ---nerve appearance gradual progression OD   Angles open post CE   IOP ranging mid teens on multi -meds   Neg fhx glaucoma     /590     OCT 8/2023   G 70 onl w/ def G/TS   G 90 wnl w/ good rnfl     Fields 6/2018   PSD  1.63 <10% w/ no gross glaucoma prog --high FP   1.40 bord     Schedule updated fields     Continue OU   Latanoprost qhs   Cosopt bid     IOP 6 months     2.   RD precautions given and reviewed. Patient knows to call/ message if any further changes in symptoms occur.  3.   Stable OU  OD 2015 [place]   OS 2020 [place] 2021     RTC for update fields, then IOP in 6 months

## 2023-08-24 ENCOUNTER — TELEPHONE (OUTPATIENT)
Dept: FAMILY MEDICINE | Facility: CLINIC | Age: 88
End: 2023-08-24
Payer: MEDICARE

## 2023-08-24 NOTE — TELEPHONE ENCOUNTER
----- Message from Stephy Jesus sent at 8/24/2023  9:32 AM CDT -----  Contact: patient  Type: Needs Medical Advice  Who Called:  patient  Best Call Back Number: [phone] (home)   Additional Information: patient started a BP medication and she thinks she is having a reaction to it- she is having diarrhea and severe cramping- she stopped taking it and it stopped

## 2023-08-28 ENCOUNTER — TELEPHONE (OUTPATIENT)
Dept: FAMILY MEDICINE | Facility: CLINIC | Age: 88
End: 2023-08-28
Payer: MEDICARE

## 2023-08-28 NOTE — TELEPHONE ENCOUNTER
----- Message from Berenice Kebede sent at 8/28/2023  8:54 AM CDT -----  Regarding: Needs return call  Type:  Patient Returning Call    Who Called:  Libby  Who Left Message for Patient:  Makenzie  Does the patient know what this is regarding?:  Yes  Best Call Back Number:  [phone]        Additional Information:

## 2023-09-26 ENCOUNTER — OFFICE VISIT (OUTPATIENT)
Dept: GASTROENTEROLOGY | Facility: CLINIC | Age: 88
End: 2023-09-26
Payer: MEDICARE

## 2023-09-26 VITALS — HEIGHT: 64 IN | WEIGHT: 149.69 LBS | BODY MASS INDEX: 25.56 KG/M2

## 2023-09-26 DIAGNOSIS — R10.9 ABDOMINAL CRAMPING: ICD-10-CM

## 2023-09-26 DIAGNOSIS — R11.0 NAUSEA: ICD-10-CM

## 2023-09-26 DIAGNOSIS — Z87.19 HISTORY OF DIVERTICULITIS: ICD-10-CM

## 2023-09-26 DIAGNOSIS — R19.8 IRREGULAR BOWEL HABITS: ICD-10-CM

## 2023-09-26 DIAGNOSIS — K52.9 CHRONIC DIARRHEA: Primary | ICD-10-CM

## 2023-09-26 DIAGNOSIS — Z86.19 HISTORY OF CLOSTRIDIUM DIFFICILE INFECTION: ICD-10-CM

## 2023-09-26 PROCEDURE — 99214 PR OFFICE/OUTPT VISIT, EST, LEVL IV, 30-39 MIN: ICD-10-PCS | Mod: S$PBB,,, | Performed by: NURSE PRACTITIONER

## 2023-09-26 PROCEDURE — 99999 PR PBB SHADOW E&M-EST. PATIENT-LVL III: CPT | Mod: PBBFAC,,, | Performed by: NURSE PRACTITIONER

## 2023-09-26 PROCEDURE — 99213 OFFICE O/P EST LOW 20 MIN: CPT | Mod: PBBFAC,PO | Performed by: NURSE PRACTITIONER

## 2023-09-26 PROCEDURE — 99214 OFFICE O/P EST MOD 30 MIN: CPT | Mod: S$PBB,,, | Performed by: NURSE PRACTITIONER

## 2023-09-26 PROCEDURE — 99999 PR PBB SHADOW E&M-EST. PATIENT-LVL III: ICD-10-PCS | Mod: PBBFAC,,, | Performed by: NURSE PRACTITIONER

## 2023-09-26 RX ORDER — CHOLESTYRAMINE 4 G/9G
4 POWDER, FOR SUSPENSION ORAL DAILY
Qty: 30 PACKET | Refills: 2 | Status: SHIPPED | OUTPATIENT
Start: 2023-09-26 | End: 2023-11-16

## 2023-09-26 NOTE — PROGRESS NOTES
Subjective:       Patient ID: Libby Estrada is a 88 y.o. female, Body mass index is 25.69 kg/m².    Chief Complaint: Diarrhea      Established patient of myself.    Patient resides at [facility]. Accompanied by daughter.    Diarrhea   This is a chronic problem. The current episode started more than 1 month ago. The problem occurs 2 to 4 times per day (intermittent). The problem has been waxing and waning. The stool consistency is described as Mucous and watery (describes stool as type 5-7 on bristol scale; denies bloody stools). The patient states that diarrhea does not awaken her from sleep. Associated symptoms include abdominal pain (generalized abdominal pain; intermittent) and vomiting (x1). Pertinent negatives include no bloating, chills, coughing, fever, increased  flatus or weight loss. Exacerbated by: eating; worse in the AM. Risk factors include recent hospitalization and recent antibiotic use (hospitalized in 4/2023 for colitis/diverticulitis; denies foreign travel). She has tried anti-motility drug and bismuth subsalicylate (Currently: OTC Imodium or Pepto bismol PRN- helps; OTC Probiotic once daily- unsure if it helps) for the symptoms. There is no history of bowel resection, inflammatory bowel disease, irritable bowel syndrome or a recent abdominal surgery. Hx of Clostridium difficile infection in 2022     Review of Systems   Constitutional:  Negative for appetite change, chills, fever, unexpected weight change and weight loss.   HENT:  Negative for trouble swallowing.    Respiratory:  Negative for cough and shortness of breath.    Cardiovascular:  Negative for chest pain.   Gastrointestinal:  Positive for abdominal pain (generalized abdominal pain; intermittent), diarrhea, nausea and vomiting (x1). Negative for abdominal distention, anal bleeding, bloating, blood in stool, constipation, flatus and rectal pain.   Genitourinary:  Negative for difficulty urinating and dysuria.    Musculoskeletal:  Positive for gait problem.   Skin:  Negative for rash.   Neurological:  Negative for speech difficulty.   Psychiatric/Behavioral:  Negative for confusion.        Past Medical History:   Diagnosis Date    Arthritis     Cancer     skin cancer    Cataract     Done OU    Chronic back pain     CKD (chronic kidney disease)     Clostridioides difficile infection     Diverticulosis     Episode of hypertension 12/28/2012    pt states she does not have, Maybe white coat syndrome    GERD (gastroesophageal reflux disease)     Glaucoma     suspect    Hyperlipidemia     Ocular hypertension     Prediabetes     Primary hypertension 5/17/2023    Spinal stenosis       Past Surgical History:   Procedure Laterality Date    APPENDECTOMY      CATARACT EXTRACTION W/  INTRAOCULAR LENS IMPLANT Right 07/08/2015    Dr Engel    CATARACT EXTRACTION W/  INTRAOCULAR LENS IMPLANT Left 01/16/2020    Dr Francois    COLONOSCOPY      EPIDURAL STEROID INJECTION INTO LUMBAR SPINE N/A 11/11/2020    Procedure: Injection-steroid-epidural-lumbar L5/S1;  Surgeon: Cachorro Figueroa MD;  Location: [facility] OR;  Service: Pain Management;  Laterality: N/A;    EPIDURAL STEROID INJECTION INTO LUMBAR SPINE N/A 12/20/2021    Procedure: Injection-steroid-epidural-lumbar L5/S1 to the left;  Surgeon: Cachorro Figueroa MD;  Location: [facility] OR;  Service: Pain Management;  Laterality: N/A;    INSERTION OF DORSAL COLUMN NERVE STIMULATOR FOR TRIAL Bilateral 5/11/2022    Procedure: INSERTION, NEUROSTIMULATOR, SPINAL CORD, DORSAL COLUMN, FOR TRIAL;  Surgeon: Cachorro Figueroa MD;  Location: [facility] OR;  Service: Pain Management;  Laterality: Bilateral;    INSERTION OF SPINAL NEUROSTIMULATOR N/A 5/30/2022    Procedure: INSERTION, NEUROSTIMULATOR, SPINAL;  Surgeon: Cachorro Figueroa MD;  Location: [facility] OR;  Service: Pain Management;  Laterality: N/A;    LUMBAR EPIDURAL INJECTION      MOUTH SURGERY      dental implants    PHACOEMULSIFICATION OF CATARACT Left 1/16/2020     Procedure: PHACOEMULSIFICATION, CATARACT;  Surgeon: Francis Francois Jr., MD;  Location: [facility];  Service: Ophthalmology;  Laterality: Left;  Left    Yag Capsulotomy Right 10/01/2021    Dr Francois      Family History   Problem Relation Age of Onset    Diabetes Mother     Heart attack Mother     Cancer Maternal Aunt         ovarian    Heart attack Father     Arthritis Father     Heart disease Father          at age 68    Heart attack Sister     Heart attack Brother     Amblyopia Neg Hx     Blindness Neg Hx     Cataracts Neg Hx     Hypertension Neg Hx     Macular degeneration Neg Hx     Retinal detachment Neg Hx     Strabismus Neg Hx     Stroke Neg Hx     Thyroid disease Neg Hx     Glaucoma Neg Hx       Wt Readings from Last 10 Encounters:   23 67.9 kg (149 lb 11.1 oz)   23 67 kg (147 lb 11.3 oz)   23 70.8 kg (156 lb)   22 69.1 kg (152 lb 5.4 oz)   22 68.7 kg (151 lb 7.3 oz)   22 70.2 kg (154 lb 12.2 oz)   22 70 kg (154 lb 5.2 oz)   22 70 kg (154 lb 5.2 oz)   22 68.9 kg (152 lb 0.1 oz)   22 70.9 kg (156 lb 4.9 oz)     Lab Results   Component Value Date    WBC 6.95 2023    HGB 11.7 (L) 2023    HCT 35.2 (L) 2023    MCV 90 2023     2023     CMP  Sodium   Date Value Ref Range Status   2023 136 136 - 145 mmol/L Final   2015 142 137 - 145 MMOL/L Final     Potassium   Date Value Ref Range Status   2023 4.3 3.5 - 5.1 mmol/L Final   2015 4.3 3.5 - 5.1 MMOL/L Final     Chloride   Date Value Ref Range Status   2023 101 95 - 110 mmol/L Final   2015 103 98 - 107 MMOL/L Final     CO2   Date Value Ref Range Status   2023 27 23 - 29 mmol/L Final     Glucose   Date Value Ref Range Status   2023 84 70 - 110 mg/dL Final     BUN   Date Value Ref Range Status   2023 13 8 - 23 mg/dL Final     Creatinine   Date Value Ref Range Status   2023 0.8 0.5 - 1.4 mg/dL Final   2015  0.86 0.52 - 1.04 MG/DL Final     Calcium   Date Value Ref Range Status   06/01/2023 9.6 8.7 - 10.5 mg/dL Final     Total Protein   Date Value Ref Range Status   06/01/2023 7.2 6.0 - 8.4 g/dL Final     Albumin   Date Value Ref Range Status   06/01/2023 4.0 3.5 - 5.2 g/dL Final   06/25/2015 3.9 3.5 - 5.0 G/DL Final     Total Bilirubin   Date Value Ref Range Status   06/01/2023 0.3 0.1 - 1.0 mg/dL Final     Comment:     For infants and newborns, interpretation of results should be based  on gestational age, weight and in agreement with clinical  observations.    Premature Infant recommended reference ranges:  Up to 24 hours.............<8.0 mg/dL  Up to 48 hours............<12.0 mg/dL  3-5 days..................<15.0 mg/dL  6-29 days.................<15.0 mg/dL       Alkaline Phosphatase   Date Value Ref Range Status   06/01/2023 66 55 - 135 U/L Final     AST   Date Value Ref Range Status   06/01/2023 16 10 - 40 U/L Final     ALT   Date Value Ref Range Status   06/01/2023 10 10 - 44 U/L Final     Anion Gap   Date Value Ref Range Status   06/01/2023 8 8 - 16 mmol/L Final     eGFR if    Date Value Ref Range Status   06/19/2022 >60 >60 mL/min/1.73 m^2 Final     eGFR if non    Date Value Ref Range Status   06/19/2022 >60 >60 mL/min/1.73 m^2 Final     Comment:     Calculation used to obtain the estimated glomerular filtration  rate (eGFR) is the CKD-EPI equation.          Lab Results   Component Value Date    LIPASERES 157 04/21/2023             Reviewed prior medical records including radiology report of CT of abdomen and pelvis 4/21/23 & endoscopy history (see surgical history).     Objective:      Physical Exam  Constitutional:       General: She is not in acute distress.     Appearance: She is well-developed.   HENT:      Head: Normocephalic.      Right Ear: Hearing normal.      Left Ear: Hearing normal.      Nose: Nose normal.      Mouth/Throat:      Mouth: No oral lesions.      Pharynx:  Uvula midline.   Eyes:      General: Lids are normal.      Conjunctiva/sclera: Conjunctivae normal.      Pupils: Pupils are equal, round, and reactive to light.   Neck:      Trachea: Trachea normal.   Cardiovascular:      Rate and Rhythm: Normal rate and regular rhythm.      Heart sounds: Normal heart sounds. No murmur heard.  Pulmonary:      Effort: Pulmonary effort is normal. No respiratory distress.      Breath sounds: Normal breath sounds. No stridor. No wheezing.   Abdominal:      General: Bowel sounds are normal. There is no distension.      Palpations: Abdomen is soft. There is no mass.      Tenderness: There is generalized abdominal tenderness. There is no guarding or rebound.   Musculoskeletal:         General: Normal range of motion.      Cervical back: Normal range of motion.      Comments: Ambulates with walker   Skin:     General: Skin is warm and dry.      Findings: No rash.      Comments: Non jaundiced   Neurological:      Mental Status: She is alert and oriented to person, place, and time.   Psychiatric:         Speech: Speech normal.         Behavior: Behavior normal. Behavior is cooperative.           Assessment:       1. Chronic diarrhea    2. Irregular bowel habits    3. Abdominal cramping    4. Nausea    5. History of Clostridium difficile infection    6. History of diverticulitis           Plan:   All diagnoses and orders for this visit:    Chronic diarrhea, Irregular bowel habits, Abdominal cramping, Nausea & History of Clostridium difficile infection  - Stool Exam-Ova,Cysts,Parasites; Future; Expected date: 09/26/2023  - Giardia / Cryptosporidum, EIA; Future; Expected date: 09/26/2023  - Stool culture; Future; Expected date: 09/26/2023  - Occult blood x 1, stool; Future; Expected date: 09/26/2023  - WBC, Stool; Future; Expected date: 09/26/2023  - pH, stool; Future; Expected date: 09/26/2023  - Clostridium difficile EIA; Future; Expected date: 09/26/2023  - Recommended increase fiber in diet,  especially soluble fiber since this can help bulk up the stool consistency and may help to slow down how fast the stool goes through the colon and can prevent diarrhea   - Start: cholestyramine (QUESTRAN) 4 gram packet; Take 1 packet (4 g total) by mouth once daily.  Dispense: 30 packet; Refill: 2  - Recommended colonoscopy but patient declined    History of diverticulitis   - Recommend high fiber diet (20-30 grams of fiber daily)/OTC fiber supplements daily as directed.    - Recommended colonoscopy but patient declined     If no improvement in symptoms or symptoms worsen, call/follow-up at clinic or go to ER

## 2023-10-03 ENCOUNTER — LAB VISIT (OUTPATIENT)
Dept: LAB | Facility: HOSPITAL | Age: 88
End: 2023-10-03
Attending: NURSE PRACTITIONER
Payer: MEDICARE

## 2023-10-03 DIAGNOSIS — K52.9 CHRONIC DIARRHEA: ICD-10-CM

## 2023-10-03 LAB
C DIFF GDH STL QL: NEGATIVE
C DIFF TOX A+B STL QL IA: NEGATIVE
WBC #/AREA STL HPF: NORMAL /[HPF]

## 2023-10-03 PROCEDURE — 87045 FECES CULTURE AEROBIC BACT: CPT | Performed by: NURSE PRACTITIONER

## 2023-10-03 PROCEDURE — 87209 SMEAR COMPLEX STAIN: CPT | Performed by: NURSE PRACTITIONER

## 2023-10-03 PROCEDURE — 87046 STOOL CULTR AEROBIC BACT EA: CPT | Performed by: NURSE PRACTITIONER

## 2023-10-03 PROCEDURE — 89055 LEUKOCYTE ASSESSMENT FECAL: CPT | Performed by: NURSE PRACTITIONER

## 2023-10-03 PROCEDURE — 82272 OCCULT BLD FECES 1-3 TESTS: CPT | Performed by: NURSE PRACTITIONER

## 2023-10-03 PROCEDURE — 87449 NOS EACH ORGANISM AG IA: CPT | Performed by: NURSE PRACTITIONER

## 2023-10-03 PROCEDURE — 83986 ASSAY PH BODY FLUID NOS: CPT | Performed by: NURSE PRACTITIONER

## 2023-10-03 PROCEDURE — 87329 GIARDIA AG IA: CPT | Performed by: NURSE PRACTITIONER

## 2023-10-03 PROCEDURE — 87449 NOS EACH ORGANISM AG IA: CPT | Mod: 91 | Performed by: NURSE PRACTITIONER

## 2023-10-03 PROCEDURE — 87427 SHIGA-LIKE TOXIN AG IA: CPT | Performed by: NURSE PRACTITIONER

## 2023-10-04 LAB
CRYPTOSP AG STL QL IA: NEGATIVE
E COLI SXT1 STL QL IA: NEGATIVE
E COLI SXT2 STL QL IA: NEGATIVE
G LAMBLIA AG STL QL IA: NEGATIVE
OB PNL STL: NEGATIVE

## 2023-10-05 LAB — BACTERIA STL CULT: NORMAL

## 2023-10-06 LAB — PH STL: 7 [PH] (ref 5–8.5)

## 2023-10-09 LAB — O+P STL MICRO: NORMAL

## 2023-11-16 ENCOUNTER — OFFICE VISIT (OUTPATIENT)
Dept: FAMILY MEDICINE | Facility: CLINIC | Age: 88
End: 2023-11-16
Payer: MEDICARE

## 2023-11-16 ENCOUNTER — LAB VISIT (OUTPATIENT)
Dept: LAB | Facility: HOSPITAL | Age: 88
End: 2023-11-16
Attending: FAMILY MEDICINE
Payer: MEDICARE

## 2023-11-16 VITALS
HEART RATE: 89 BPM | DIASTOLIC BLOOD PRESSURE: 80 MMHG | WEIGHT: 154.13 LBS | BODY MASS INDEX: 26.31 KG/M2 | HEIGHT: 64 IN | OXYGEN SATURATION: 97 % | SYSTOLIC BLOOD PRESSURE: 138 MMHG

## 2023-11-16 DIAGNOSIS — I10 PRIMARY HYPERTENSION: ICD-10-CM

## 2023-11-16 DIAGNOSIS — I10 PRIMARY HYPERTENSION: Primary | ICD-10-CM

## 2023-11-16 LAB
ALBUMIN SERPL BCP-MCNC: 4.2 G/DL (ref 3.5–5.2)
ALP SERPL-CCNC: 72 U/L (ref 55–135)
ALT SERPL W/O P-5'-P-CCNC: 13 U/L (ref 10–44)
ANION GAP SERPL CALC-SCNC: 11 MMOL/L (ref 8–16)
AST SERPL-CCNC: 20 U/L (ref 10–40)
BILIRUB SERPL-MCNC: 0.2 MG/DL (ref 0.1–1)
BUN SERPL-MCNC: 14 MG/DL (ref 8–23)
CALCIUM SERPL-MCNC: 9.6 MG/DL (ref 8.7–10.5)
CHLORIDE SERPL-SCNC: 102 MMOL/L (ref 95–110)
CO2 SERPL-SCNC: 25 MMOL/L (ref 23–29)
CREAT SERPL-MCNC: 0.8 MG/DL (ref 0.5–1.4)
ERYTHROCYTE [DISTWIDTH] IN BLOOD BY AUTOMATED COUNT: 13.5 % (ref 11.5–14.5)
EST. GFR  (NO RACE VARIABLE): >60 ML/MIN/1.73 M^2
GLUCOSE SERPL-MCNC: 94 MG/DL (ref 70–110)
HCT VFR BLD AUTO: 36.4 % (ref 37–48.5)
HGB BLD-MCNC: 12.2 G/DL (ref 12–16)
MCH RBC QN AUTO: 29.6 PG (ref 27–31)
MCHC RBC AUTO-ENTMCNC: 33.5 G/DL (ref 32–36)
MCV RBC AUTO: 88 FL (ref 82–98)
PLATELET # BLD AUTO: 282 K/UL (ref 150–450)
PMV BLD AUTO: 9.5 FL (ref 9.2–12.9)
POTASSIUM SERPL-SCNC: 4.2 MMOL/L (ref 3.5–5.1)
PROT SERPL-MCNC: 7.8 G/DL (ref 6–8.4)
RBC # BLD AUTO: 4.12 M/UL (ref 4–5.4)
SODIUM SERPL-SCNC: 138 MMOL/L (ref 136–145)
TSH SERPL DL<=0.005 MIU/L-ACNC: 1.25 UIU/ML (ref 0.4–4)
WBC # BLD AUTO: 5.99 K/UL (ref 3.9–12.7)

## 2023-11-16 PROCEDURE — 99213 OFFICE O/P EST LOW 20 MIN: CPT | Mod: PBBFAC,PO | Performed by: FAMILY MEDICINE

## 2023-11-16 PROCEDURE — 99999 PR PBB SHADOW E&M-EST. PATIENT-LVL III: CPT | Mod: PBBFAC,,, | Performed by: FAMILY MEDICINE

## 2023-11-16 PROCEDURE — 99214 PR OFFICE/OUTPT VISIT, EST, LEVL IV, 30-39 MIN: ICD-10-PCS | Mod: S$PBB,,, | Performed by: FAMILY MEDICINE

## 2023-11-16 PROCEDURE — 84443 ASSAY THYROID STIM HORMONE: CPT | Performed by: FAMILY MEDICINE

## 2023-11-16 PROCEDURE — 80053 COMPREHEN METABOLIC PANEL: CPT | Performed by: FAMILY MEDICINE

## 2023-11-16 PROCEDURE — 85027 COMPLETE CBC AUTOMATED: CPT | Performed by: FAMILY MEDICINE

## 2023-11-16 PROCEDURE — 99214 OFFICE O/P EST MOD 30 MIN: CPT | Mod: S$PBB,,, | Performed by: FAMILY MEDICINE

## 2023-11-16 PROCEDURE — 99999 PR PBB SHADOW E&M-EST. PATIENT-LVL III: ICD-10-PCS | Mod: PBBFAC,,, | Performed by: FAMILY MEDICINE

## 2023-11-16 PROCEDURE — 36415 COLL VENOUS BLD VENIPUNCTURE: CPT | Mod: PO | Performed by: FAMILY MEDICINE

## 2023-11-16 RX ORDER — METOPROLOL TARTRATE 25 MG/1
25 TABLET, FILM COATED ORAL NIGHTLY
Qty: 90 TABLET | Refills: 3
Start: 2023-11-16 | End: 2024-02-08

## 2023-11-16 RX ORDER — GABAPENTIN 300 MG/1
300 CAPSULE ORAL 3 TIMES DAILY
Qty: 270 CAPSULE | Refills: 3 | Status: SHIPPED | OUTPATIENT
Start: 2023-11-16 | End: 2024-11-15

## 2023-11-16 NOTE — PROGRESS NOTES
Subjective:       Patient ID: Libby Estrada is a 88 y.o. female.    Chief Complaint: Follow-up (6 month f/u)    Here for follow up multiple chronic medical issues. Doing well overall and in normal state of health.  Prn imodium working well.  Questran caused constipation.    Follow-up  Pertinent negatives include no chest pain, chills, coughing or fever.     Review of Systems   Constitutional:  Negative for chills and fever.   Respiratory:  Negative for cough, chest tightness and shortness of breath.    Cardiovascular:  Negative for chest pain, palpitations and leg swelling.   Endocrine: Negative for cold intolerance and heat intolerance.   Psychiatric/Behavioral:  Negative for decreased concentration. The patient is not nervous/anxious.        Objective:      Physical Exam  Vitals and nursing note reviewed.   Constitutional:       Appearance: She is well-developed.   HENT:      Head: Normocephalic and atraumatic.   Cardiovascular:      Rate and Rhythm: Normal rate and regular rhythm.      Heart sounds: Normal heart sounds.   Pulmonary:      Effort: Pulmonary effort is normal.      Breath sounds: Normal breath sounds.   Psychiatric:         Mood and Affect: Mood normal.         Behavior: Behavior normal.         Assessment:       1. Primary hypertension        Plan:       Primary hypertension  -     CBC Without Differential; Future; Expected date: 11/16/2023  -     Comprehensive Metabolic Panel; Future; Expected date: 11/16/2023  -     TSH; Future; Expected date: 11/16/2023    Other orders  -     metoprolol tartrate (LOPRESSOR) 25 MG tablet; Take 1 tablet (25 mg total) by mouth every evening.  Dispense: 90 tablet; Refill: 3  -     gabapentin (NEURONTIN) 300 MG capsule; Take 1 capsule (300 mg total) by mouth 3 (three) times daily.  Dispense: 270 capsule; Refill: 3      Refill needed meds  Update labs today  Will monitor chronic medical issues and continue current plan of care.      Follow up in about 6 months (around  5/16/2024), or if symptoms worsen or fail to improve.

## 2023-12-03 DIAGNOSIS — Z71.89 COMPLEX CARE COORDINATION: ICD-10-CM

## 2023-12-19 ENCOUNTER — TELEPHONE (OUTPATIENT)
Dept: FAMILY MEDICINE | Facility: CLINIC | Age: 88
End: 2023-12-19
Payer: MEDICARE

## 2023-12-19 NOTE — TELEPHONE ENCOUNTER
----- Message from Dallas Lei sent at 12/19/2023 12:58 PM CST -----  Type: Needs Medical Advice  Who Called:  pt  Best Call Back Number: [phone]  Additional Information: pt was tested positive for COVID and the recommended her to reach out to her PCP to get something sent in the pharmacy. Pl call bk to advise thanks      [facility] [address], [address]  [address] [facility], [address] [address]  Phone: [phone] Fax: [phone]

## 2024-01-11 DIAGNOSIS — Z00.00 ENCOUNTER FOR MEDICARE ANNUAL WELLNESS EXAM: ICD-10-CM

## 2024-02-08 RX ORDER — METOPROLOL TARTRATE 25 MG/1
25 TABLET, FILM COATED ORAL 2 TIMES DAILY
Qty: 180 TABLET | Refills: 3 | Status: SHIPPED | OUTPATIENT
Start: 2024-02-08

## 2024-02-08 NOTE — TELEPHONE ENCOUNTER
Care Due:                  Date            Visit Type   Department     Provider  --------------------------------------------------------------------------------                                ESTABLISHED   [facility] FAMILY  Last Visit: 11-      PATIENT      MEDICINE       DOMINIC SMITH                               -                              PRIMARY      [facility]  Next Visit: 05-      CARE ([facility])   MEDICINE       DOMINIC SMITH                                                            Last  Test          Frequency    Reason                     Performed    Due Date  --------------------------------------------------------------------------------    Lipid Panel.  12 months..  ezetimibe................  03-   03-    [facility] Embedded Care Due Messages. Reference number: [id number].   2/08/2024 8:39:46 AM CST

## 2024-02-08 NOTE — TELEPHONE ENCOUNTER
Provider Staff:  Action required for this patient    Requires labs      Please see care gap opportunities below in Care Due Message.    Thanks!  [facility]     Appointments      Date Provider   Last Visit   11/16/2023 DOMINIC Salas MD   Next Visit   5/23/2024 DOMINIC Salas MD     Refill Decision Note   Libby Estrada  is requesting a refill authorization.  Brief Assessment and Rationale for Refill:  Approve     Medication Therapy Plan:         Comments:     Note composed:10:53 AM 02/08/2024

## 2024-02-12 ENCOUNTER — OFFICE VISIT (OUTPATIENT)
Dept: OPTOMETRY | Facility: CLINIC | Age: 89
End: 2024-02-12
Payer: MEDICARE

## 2024-02-12 DIAGNOSIS — H40.1111 PRIMARY OPEN ANGLE GLAUCOMA (POAG) OF RIGHT EYE, MILD STAGE: Primary | ICD-10-CM

## 2024-02-12 DIAGNOSIS — H16.141 SUPERFICIAL PUNCTATE KERATITIS OF RIGHT EYE: ICD-10-CM

## 2024-02-12 PROCEDURE — 99213 OFFICE O/P EST LOW 20 MIN: CPT | Mod: S$PBB,,, | Performed by: OPTOMETRIST

## 2024-02-12 PROCEDURE — 99213 OFFICE O/P EST LOW 20 MIN: CPT | Mod: PBBFAC,PO | Performed by: OPTOMETRIST

## 2024-02-12 PROCEDURE — 99999 PR PBB SHADOW E&M-EST. PATIENT-LVL III: CPT | Mod: PBBFAC,,, | Performed by: OPTOMETRIST

## 2024-02-12 RX ORDER — TIMOLOL MALEATE 5 MG/ML
1 SOLUTION/ DROPS OPHTHALMIC 2 TIMES DAILY
COMMUNITY
Start: 2024-02-06

## 2024-02-12 RX ORDER — DORZOLAMIDE HCL 20 MG/ML
1 SOLUTION/ DROPS OPHTHALMIC 2 TIMES DAILY
COMMUNITY
Start: 2024-02-06

## 2024-02-12 NOTE — PROGRESS NOTES
HPI    DLE - 08/16/2023    Pt. States VA OU is okay since last visit. Eyes get tired fairly often.   Reads often and plays games on her tablet.   Pt. Is using Latanoprost QHS, Dorozolamide BID OU, and timolol BID OU gtts   as directed.  Pt. Has occasional flashes and floaters in OD.     Last edited by Les Castillo MA on 2/12/2024  9:56 AM.            Assessment /Plan     For exam results, see Encounter Report.    Primary open angle glaucoma (POAG) of right eye, mild stage    Vitreous floaters, bilateral    Bilateral pseudophakia

## 2024-02-12 NOTE — PROGRESS NOTES
HPI    DLE - 08/16/2023    Pt. States VA OU is okay since last visit. Eyes get tired fairly often.   Reads often and plays games on her tablet.   Pt. Is using Latanoprost QHS, Dorozolamide BID OU, and timolol BID OU gtts   as directed.  Pt. Has occasional flashes and floaters in OD.     Last edited by Les Castillo MA on 2/12/2024  9:56 AM.            Assessment /Plan     For exam results, see Encounter Report.    Primary open angle glaucoma (POAG) of right eye, mild stage    Superficial punctate keratitis of right eye        Longstanding tx for Oc HTN ---nerve appearance gradual progression OD   Angles open post CE --update gonio next    IOP ranging mid teens on multi -meds   Neg fhx glaucoma      /590      OCT 8/2023   G 70 onl w/ def G/TS   G 90 wnl w/ good rnfl      Fields 6/2018   PSD  1.63 <10% w/ no gross glaucoma prog --high FP   1.40 bord      Schedule updated fields      Continue OU   Latanoprost qhs   Cosopt bid     2.   Central spk noted OD ---discussed taking more breaks reading / near work and increase use of Systane tid-qid   Message if not effective, can consider Rx gtts in future     RTC 6 months for annual w/ fields

## 2024-02-12 NOTE — PROGRESS NOTES
Outpatient Care Management  Plan of Care Follow Up Visit    Patient: Libby Estrada  MRN: [id number]  Date of Service: 05/29/2023  Completed by: Lorna Mccormack RN  Referral Date: 04/24/2023    Reason for Visit   Patient presents with    OPCM RN Follow Up Call       Brief Summary: OPCM RN followed up with Ms. Estrada today and she is feeling better.  Next Steps:   Ms. Estrada agreed to OPCM RN follow up on or around 6/8/23.  Encourage avoidance of insoluble fiber foods including leafy greens and peels or skins of vegetables and fruit; limit lactose if diarrhea is severe.     
Never smoker

## 2024-02-15 NOTE — PROGRESS NOTES
Subjective:       Patient ID: Libby Estrada is a 87 y.o. female.    Chief Complaint: Referral (GI)    Here for ed f/u. Diverticulitis treated 4 days ago with cipro and flagyl. Took 10 days doxy in early June for back superficial infection as well.       Review of Systems   Constitutional: Negative for chills and fever.   Respiratory: Negative for cough, chest tightness and shortness of breath.    Cardiovascular: Negative for chest pain, palpitations and leg swelling.   Gastrointestinal: Positive for diarrhea.   Endocrine: Negative for cold intolerance and heat intolerance.   Psychiatric/Behavioral: Negative for decreased concentration. The patient is not nervous/anxious.        Objective:      Physical Exam  Vitals and nursing note reviewed.   Constitutional:       Appearance: She is well-developed.   HENT:      Head: Normocephalic and atraumatic.   Cardiovascular:      Rate and Rhythm: Normal rate and regular rhythm.      Heart sounds: Normal heart sounds.   Pulmonary:      Effort: Pulmonary effort is normal.      Breath sounds: Normal breath sounds.   Abdominal:      General: Abdomen is flat. Bowel sounds are normal.      Palpations: Abdomen is soft.   Psychiatric:         Mood and Affect: Mood normal.         Behavior: Behavior normal.         Assessment:       1. Diverticulitis    2. Diarrhea, unspecified type    3. Hyperlipidemia, unspecified hyperlipidemia type    4. Prediabetes        Plan:       Diverticulitis  -     Ambulatory referral/consult to Gastroenterology; Future; Expected date: 06/30/2022    Diarrhea, unspecified type  -     Ambulatory referral/consult to Gastroenterology; Future; Expected date: 06/30/2022    Hyperlipidemia, unspecified hyperlipidemia type  -     Comprehensive Metabolic Panel; Future; Expected date: 06/23/2022  -     CBC Without Differential; Future; Expected date: 06/23/2022  -     Hemoglobin A1C; Future; Expected date: 06/23/2022  -     Lipid Panel; Future; Expected date:  06/23/2022  -     TSH; Future; Expected date: 06/23/2022    Prediabetes  -     Comprehensive Metabolic Panel; Future; Expected date: 06/23/2022  -     CBC Without Differential; Future; Expected date: 06/23/2022  -     Hemoglobin A1C; Future; Expected date: 06/23/2022  -     Lipid Panel; Future; Expected date: 06/23/2022  -     TSH; Future; Expected date: 06/23/2022    Other orders  -     diphenoxylate-atropine 2.5-0.025 mg (LOMOTIL) 2.5-0.025 mg per tablet; Take 1 tablet by mouth 4 (four) times daily as needed for Diarrhea.  Dispense: 30 tablet; Refill: 0      Tolerating po and drinking pedialyte  abd pain improved  Return precautions given  If diarrhea worsens with work up for possible c. Diff  Prn med and monitor closely  Start keferFollow up in about 3 months (around 9/23/2022), or if symptoms worsen or fail to improve.       VTE

## 2024-02-26 NOTE — PATIENT INSTRUCTIONS
Alexfer drink for probiotic  Hydrate with pedialyte of similar      Discharge Planner Post-Acute Rehab PT:      Discharge Plan: home w/ spouse, adult children     Precautions: falls, sternal     Current Status:  Bed Mobility: min A with bed flat and rail used  Transfer: supervision  Gait: no device in room, CGA; 4ww hallway weaning O2, [employer]/ O2 tank mgmt assist  Stairs: 4x6 steps in stairwell with seated rest between each bout; L rail, CGA,step-to pattern  Balance: stands w/o UE support     Outcome Measures:   Gait speed 2/23:        -w/o device: 0.3 m/s       -w/ 4ww: 0.43 m/s  6 Minute Walk Test on Feb 24: 107 m  HILL on Feb 25 44/56     Assessment: AM & PM sessions focused on functional endurance with ambulation bouts and stairs repeats in stairwell in AM and gait bouts, standing activities and NuStep in PM. Weaned from 2L to 1L O2 in AM with SpO2 >90 with spot checks. In PM, trialed exercise/activity on RA and pt mostly satting >90 until end of session when fatigued, desatted to 86 after final ambulation bout to return to room. Recovered to 96 on 2L O2 and able to return to 1L O2 and maintain Spo2 in mid 90s at end of session. Also performed standing balance task during session of tossing balls into basket without UE support with SBA.     Other Barriers to Discharge (DME, Family Training, etc):   Stairs to access bathroom/shower

## 2024-04-10 RX ORDER — GABAPENTIN 300 MG/1
300 CAPSULE ORAL 3 TIMES DAILY
Qty: 270 CAPSULE | Refills: 3 | OUTPATIENT
Start: 2024-04-10

## 2024-04-10 NOTE — TELEPHONE ENCOUNTER
Care Due:                  Date            Visit Type   Department     Provider  --------------------------------------------------------------------------------                                ESTABLISHED   [facility]  Last Visit: 11-      PATIENT      MEDICINE       DOMINIC SMITH                               -                              PRIMARY      [facility]  Next Visit: 05-      CARE ([facility])   MEDICINE       DOMINIC SMITH                                                            Last  Test          Frequency    Reason                     Performed    Due Date  --------------------------------------------------------------------------------    Lipid Panel.  12 months..  ezetimibe................  03-   03-    [facility] Embedded Care Due Messages. Reference number: [id number].   4/10/2024 9:00:59 AM CDT

## 2024-04-30 DIAGNOSIS — H40.053 BORDERLINE GLAUCOMA OF BOTH EYES WITH OCULAR HYPERTENSION: ICD-10-CM

## 2024-04-30 RX ORDER — LATANOPROST 50 UG/ML
SOLUTION/ DROPS OPHTHALMIC
Qty: 2.5 ML | Refills: 11 | Status: SHIPPED | OUTPATIENT
Start: 2024-04-30

## 2024-04-30 NOTE — PROGRESS NOTES
HPI     Refraction      Additional comments: MRx for update gls rx due to broke gls              Comments     DLS: 10/22/18    Pt states pt has a cracked glass on present gls and needs updated rx.  Has   been having more difficulty reading OS and some glare problems OS.           Last edited by Cheryle Quintana on 1/11/2019 11:08 AM. (History)        ROS     Negative for: Constitutional, Gastrointestinal, Neurological, Skin,   Genitourinary, Musculoskeletal, HENT, Endocrine, Cardiovascular, Eyes,   Respiratory, Psychiatric, Allergic/Imm, Heme/Lymph    Last edited by Francis Francois Jr., MD on 1/11/2019  1:36 PM. (History)        Assessment /Plan     For exam results, see Encounter Report.    Nuclear sclerosis, left  -no decrease in ADLS, no significant glare, and functionality is satisfactory  -counseled on what to expect if the cataracts worsening and how it can effect the vision  -continue to monitor and if vision changes patient can call for another appointment    Borderline glaucoma of both eyes with ocular hypertension  stable IOP  Continue cosopt bid ou and latanoprost qhs ou  Follow-up in about 6 months (around 7/11/2019) for IOP and Medication check; cataract check.     Pseudophakia- stable, observe    Refractive error- Rx for glasses given                                     septoplasty and Turbinoplasty , bilateral  Sphenoidotomy  with removal of tissue bilateral  Ethmoidectomy

## 2024-05-20 RX ORDER — EZETIMIBE 10 MG/1
TABLET ORAL
Qty: 90 TABLET | Refills: 0 | Status: SHIPPED | OUTPATIENT
Start: 2024-05-20 | End: 2024-06-17

## 2024-05-20 NOTE — TELEPHONE ENCOUNTER
No care due was identified.  [facility] Embedded Care Due Messages. Reference number: [id number].   5/19/2024 10:14:59 PM CDT

## 2024-05-20 NOTE — TELEPHONE ENCOUNTER
Refill Routing Note   Refill Routing Note   Medication(s) are not appropriate for processing by [facility] for the following reason(s):        Required labs outdated    [facility] action(s):  Defer             Appointments  past 12m or future 3m with PCP    Date Provider   Last Visit   11/16/2023 DOMINIC Salas MD   Next Visit   Visit date not found DOMINIC Salas MD   ED visits in past 90 days: 0        Note composed:10:16 PM 05/19/2024

## 2024-06-17 RX ORDER — EZETIMIBE 10 MG/1
10 TABLET ORAL NIGHTLY
Qty: 90 TABLET | Refills: 0 | Status: SHIPPED | OUTPATIENT
Start: 2024-06-17

## 2024-06-17 NOTE — TELEPHONE ENCOUNTER
Refill Routing Note   Medication(s) are not appropriate for processing by [facility] for the following reason(s):        Required labs outdated    [facility] action(s):  Defer   Requires labs : Yes             Appointments  past 12m or future 3m with PCP    Date Provider   Last Visit   11/16/2023 DOMINIC Salas MD   Next Visit   Visit date not found DOMINIC Salas MD   ED visits in past 90 days: 0        Note composed:2:47 PM 06/17/2024

## 2024-06-17 NOTE — TELEPHONE ENCOUNTER
Care Due:                  Date            Visit Type   Department     Provider  --------------------------------------------------------------------------------                                ESTABLISHED   [facility]  Last Visit: 11-      PATIENT      MEDICINE       DOMINIC SMITH  Next Visit: None Scheduled  None         None Found                                                            Last  Test          Frequency    Reason                     Performed    Due Date  --------------------------------------------------------------------------------    Lipid Panel.  12 months..  ezetimibe................  03-   03-    [facility] Embedded Care Due Messages. Reference number: [id number].   6/17/2024 9:53:23 AM CDT

## 2024-06-21 DIAGNOSIS — H40.1111 PRIMARY OPEN ANGLE GLAUCOMA (POAG) OF RIGHT EYE, MILD STAGE: Primary | ICD-10-CM

## 2024-06-21 RX ORDER — DORZOLAMIDE HCL 20 MG/ML
1 SOLUTION/ DROPS OPHTHALMIC 2 TIMES DAILY
Qty: 10 ML | Refills: 3 | Status: SHIPPED | OUTPATIENT
Start: 2024-06-21 | End: 2025-06-21

## 2024-06-24 RX ORDER — TIMOLOL MALEATE 5 MG/ML
1 SOLUTION/ DROPS OPHTHALMIC 2 TIMES DAILY
Qty: 10 ML | Refills: 2 | Status: SHIPPED | OUTPATIENT
Start: 2024-06-24

## 2024-07-03 DIAGNOSIS — Z71.89 COMPLEX CARE COORDINATION: ICD-10-CM

## 2024-07-16 RX ORDER — EZETIMIBE 10 MG/1
10 TABLET ORAL NIGHTLY
Qty: 90 TABLET | Refills: 0 | Status: SHIPPED | OUTPATIENT
Start: 2024-07-16

## 2024-07-16 NOTE — TELEPHONE ENCOUNTER
Refill Routing Note   Medication(s) are not appropriate for processing by [facility] for the following reason(s):        Required labs outdated  Required labs abnormal    [facility] action(s):  Defer             Appointments  past 12m or future 3m with PCP    Date Provider   Last Visit   11/16/2023 DOMINIC Salas MD   Next Visit   7/25/2024 DOMINIC Salas MD   ED visits in past 90 days: 0        Note composed:9:41 AM 07/16/2024

## 2024-07-16 NOTE — TELEPHONE ENCOUNTER
No care due was identified.  [facility] Embedded Care Due Messages. Reference number: [id number].   7/16/2024 8:45:27 AM CDT

## 2024-07-25 ENCOUNTER — OFFICE VISIT (OUTPATIENT)
Dept: FAMILY MEDICINE | Facility: CLINIC | Age: 89
End: 2024-07-25
Payer: MEDICARE

## 2024-07-25 ENCOUNTER — HOSPITAL ENCOUNTER (OUTPATIENT)
Dept: RADIOLOGY | Facility: HOSPITAL | Age: 89
Discharge: HOME | End: 2024-07-25
Attending: FAMILY MEDICINE
Payer: MEDICARE

## 2024-07-25 ENCOUNTER — TELEPHONE (OUTPATIENT)
Dept: FAMILY MEDICINE | Facility: CLINIC | Age: 89
End: 2024-07-25

## 2024-07-25 VITALS
DIASTOLIC BLOOD PRESSURE: 76 MMHG | WEIGHT: 156.75 LBS | SYSTOLIC BLOOD PRESSURE: 128 MMHG | OXYGEN SATURATION: 95 % | HEIGHT: 64 IN | BODY MASS INDEX: 26.76 KG/M2 | HEART RATE: 76 BPM

## 2024-07-25 DIAGNOSIS — M54.16 LUMBAR RADICULOPATHY: ICD-10-CM

## 2024-07-25 DIAGNOSIS — I10 PRIMARY HYPERTENSION: Primary | ICD-10-CM

## 2024-07-25 DIAGNOSIS — E78.5 HYPERLIPIDEMIA, UNSPECIFIED HYPERLIPIDEMIA TYPE: ICD-10-CM

## 2024-07-25 PROCEDURE — 99999 PR PBB SHADOW E&M-EST. PATIENT-LVL III: CPT | Mod: PBBFAC,,, | Performed by: FAMILY MEDICINE

## 2024-07-25 PROCEDURE — 72100 X-RAY EXAM L-S SPINE 2/3 VWS: CPT | Mod: 26,,, | Performed by: RADIOLOGY

## 2024-07-25 PROCEDURE — 99213 OFFICE O/P EST LOW 20 MIN: CPT | Mod: PBBFAC,PO | Performed by: FAMILY MEDICINE

## 2024-07-25 PROCEDURE — G2211 COMPLEX E/M VISIT ADD ON: HCPCS | Mod: S$PBB,,, | Performed by: FAMILY MEDICINE

## 2024-07-25 PROCEDURE — 72100 X-RAY EXAM L-S SPINE 2/3 VWS: CPT | Mod: TC,FY,PO

## 2024-07-25 PROCEDURE — 99214 OFFICE O/P EST MOD 30 MIN: CPT | Mod: S$PBB,,, | Performed by: FAMILY MEDICINE

## 2024-07-25 RX ORDER — TRAMADOL HYDROCHLORIDE 50 MG/1
50 TABLET ORAL
Qty: 20 TABLET | Refills: 0 | Status: SHIPPED | OUTPATIENT
Start: 2024-07-25

## 2024-07-25 NOTE — PROGRESS NOTES
Subjective:       Patient ID: Libby Estrada is a 89 y.o. female.    Chief Complaint: Follow-up    Here for follow up multiple chronic medical issues. Doing well overall and in normal state of health.  Not happy with pain management overall.  Still with pain and will wake up at night.    Follow-up  Associated symptoms include arthralgias, joint swelling and neck pain. Pertinent negatives include no chest pain, headaches, vomiting or weakness.     Review of Systems   Constitutional:  Negative for activity change and unexpected weight change.   HENT:  Negative for hearing loss, rhinorrhea and trouble swallowing.    Eyes:  Positive for visual disturbance. Negative for discharge.   Respiratory:  Negative for chest tightness and wheezing.    Cardiovascular:  Negative for chest pain and palpitations.   Gastrointestinal:  Negative for blood in stool, constipation, diarrhea and vomiting.   Endocrine: Negative for polydipsia and polyuria.   Genitourinary:  Negative for difficulty urinating, dysuria, hematuria and menstrual problem.   Musculoskeletal:  Positive for arthralgias, joint swelling and neck pain.   Neurological:  Negative for weakness and headaches.   Psychiatric/Behavioral:  Negative for confusion and dysphoric mood.        Objective:      Physical Exam  Vitals and nursing note reviewed.   Constitutional:       Appearance: She is well-developed.   HENT:      Head: Normocephalic and atraumatic.   Cardiovascular:      Rate and Rhythm: Normal rate and regular rhythm.      Heart sounds: Normal heart sounds.   Pulmonary:      Effort: Pulmonary effort is normal.      Breath sounds: Normal breath sounds.   Psychiatric:         Mood and Affect: Mood normal.         Behavior: Behavior normal.         Assessment:       1. Primary hypertension    2. Hyperlipidemia, unspecified hyperlipidemia type    3. Lumbar radiculopathy        Plan:       Primary hypertension  -     CBC Without Differential; Future; Expected date:  07/25/2024  -     Comprehensive Metabolic Panel; Future; Expected date: 07/25/2024  -     Lipid Panel; Future; Expected date: 07/25/2024  -     TSH; Future; Expected date: 07/25/2024    Hyperlipidemia, unspecified hyperlipidemia type  -     CBC Without Differential; Future; Expected date: 07/25/2024  -     Comprehensive Metabolic Panel; Future; Expected date: 07/25/2024  -     Lipid Panel; Future; Expected date: 07/25/2024  -     TSH; Future; Expected date: 07/25/2024    Lumbar radiculopathy  -     X-Ray Lumbar Spine AP And Lateral; Future; Expected date: 07/25/2024    Other orders  -     traMADoL (ULTRAM) 50 mg tablet; Take 1 tablet (50 mg total) by mouth every 24 hours as needed for Pain.  Dispense: 20 tablet; Refill: 0      Lipid stable  Htn stable  Will monitor chronic medical issues and continue current plan of care.  Visit today included increased complexity associated with the care of the episodic problem htn addressed and managing the longitudinal care of the patient due to the serious and/or complex managed problem(s) as above.  Permanent dmv form filled out    Follow up in about 6 months (around 1/25/2025), or if symptoms worsen or fail to improve.

## 2024-07-25 NOTE — TELEPHONE ENCOUNTER
----- Message from Susan Chapman sent at 7/25/2024 11:37 AM CDT -----  Contact: Chucky Rice  Type:  Needs Medical Advice    Who Called: Ye from Chucky Rice  Symptoms (please be specific): phar sts pt has a coding allergy and can't fill the Tramodol and needs to know if they have an alternative.     Pharmacy name and phone #:    [facility] - [facility] [address]  [address] [address]  [facility] [address]  Phone: [phone] Fax: [phone]      Would the patient rather a call back or a response via [facility]? call  Best Call Back Number: [phone] (home)     Additional Information: please advise and thank you.

## 2024-07-26 ENCOUNTER — DOCUMENTATION ONLY (OUTPATIENT)
Dept: FAMILY MEDICINE | Facility: CLINIC | Age: 89
End: 2024-07-26
Payer: MEDICARE

## 2024-07-29 ENCOUNTER — TELEPHONE (OUTPATIENT)
Dept: FAMILY MEDICINE | Facility: CLINIC | Age: 89
End: 2024-07-29
Payer: MEDICARE

## 2024-07-30 ENCOUNTER — TELEPHONE (OUTPATIENT)
Dept: FAMILY MEDICINE | Facility: CLINIC | Age: 89
End: 2024-07-30
Payer: MEDICARE

## 2024-07-30 NOTE — TELEPHONE ENCOUNTER
----- Message from Cisco Keith sent at 7/30/2024 10:22 AM CDT -----  Contact: Self  Type:  Pharmacy Calling to Clarify an RX    Name of Caller:  Chucky  Pharmacy Name:    [facility] [place] - [address]  [address] [address]  [facility] [address]  Phone: [phone] Fax: [phone]      Prescription Name:  Tramadol  What do they need to clarify?:  Allergic to codeine. Cannot fill. Please

## 2024-07-30 NOTE — TELEPHONE ENCOUNTER
Nausea to opiates which can be common; this is not true allergy; she should be able to try; if they won't fill it then not sure

## 2024-07-31 ENCOUNTER — PATIENT MESSAGE (OUTPATIENT)
Dept: FAMILY MEDICINE | Facility: CLINIC | Age: 89
End: 2024-07-31
Payer: MEDICARE

## 2024-07-31 RX ORDER — TIMOLOL MALEATE 5 MG/ML
1 SOLUTION/ DROPS OPHTHALMIC 2 TIMES DAILY
Qty: 10 ML | Refills: 2 | Status: SHIPPED | OUTPATIENT
Start: 2024-07-31

## 2024-07-31 NOTE — TELEPHONE ENCOUNTER
Would hold off opiates at this time; tramadol was a bridge; can try a different pain doc for patch options possibly

## 2024-08-13 RX ORDER — EZETIMIBE 10 MG/1
10 TABLET ORAL NIGHTLY
Qty: 90 TABLET | Refills: 3 | Status: SHIPPED | OUTPATIENT
Start: 2024-08-13

## 2024-08-13 NOTE — TELEPHONE ENCOUNTER
Refill Decision Note   Libby Bemelba  is requesting a refill authorization.  Brief Assessment and Rationale for Refill:  Approve     Medication Therapy Plan:         Comments:     Note composed:1:13 PM 08/13/2024

## 2024-08-13 NOTE — TELEPHONE ENCOUNTER
No care due was identified.  [facility] Embedded Care Due Messages. Reference number: [id number].   8/13/2024 8:45:19 AM CDT

## 2024-09-24 NOTE — TELEPHONE ENCOUNTER
No care due was identified.  [facility] Embedded Care Due Messages. Reference number: [id number].   9/24/2024 8:34:28 AM CDT

## 2024-09-24 NOTE — TELEPHONE ENCOUNTER
Refill Routing Note   Medication(s) are not appropriate for processing by [facility] for the following reason(s):        Outside of protocol    [facility] action(s):  Route               Appointments  past 12m or future 3m with PCP    Date Provider   Last Visit   7/25/2024 DOMINIC Salas MD   Next Visit   1/27/2025 DOMINIC Salas MD   ED visits in past 90 days: 0        Note composed:4:33 PM 09/24/2024

## 2024-09-25 RX ORDER — GABAPENTIN 300 MG/1
300 CAPSULE ORAL 3 TIMES DAILY
Qty: 270 CAPSULE | Refills: 3 | Status: SHIPPED | OUTPATIENT
Start: 2024-09-25

## 2024-10-20 RX ORDER — TIMOLOL MALEATE 5 MG/ML
SOLUTION/ DROPS OPHTHALMIC
Qty: 10 ML | Refills: 2 | Status: SHIPPED | OUTPATIENT
Start: 2024-10-20

## 2024-10-31 DIAGNOSIS — H40.1111 PRIMARY OPEN ANGLE GLAUCOMA (POAG) OF RIGHT EYE, MILD STAGE: ICD-10-CM

## 2024-10-31 RX ORDER — DORZOLAMIDE HCL 20 MG/ML
1 SOLUTION/ DROPS OPHTHALMIC 2 TIMES DAILY
Qty: 10 ML | Refills: 3 | Status: SHIPPED | OUTPATIENT
Start: 2024-10-31 | End: 2025-10-31

## 2025-01-30 RX ORDER — METOPROLOL TARTRATE 25 MG/1
25 TABLET, FILM COATED ORAL 2 TIMES DAILY
Qty: 180 TABLET | Refills: 1 | Status: SHIPPED | OUTPATIENT
Start: 2025-01-30

## 2025-01-30 NOTE — TELEPHONE ENCOUNTER
Refill Routing Note   Medication(s) are not appropriate for processing by [facility] for the following reason(s):        Required vitals abnormal    [facility] action(s):  Defer             Appointments  past 12m or future 3m with PCP    Date Provider   Last Visit   7/25/2024 DOMINIC Salas MD   Next Visit   Visit date not found DOMINIC Salas MD   ED visits in past 90 days: 0        Note composed:12:23 PM 01/30/2025

## 2025-01-30 NOTE — TELEPHONE ENCOUNTER
No care due was identified.  [facility] Embedded Care Due Messages. Reference number: [id number].   1/30/2025 9:43:59 AM CST

## 2025-02-10 RX ORDER — GABAPENTIN 300 MG/1
300 CAPSULE ORAL 3 TIMES DAILY
Qty: 270 CAPSULE | Refills: 3 | OUTPATIENT
Start: 2025-02-10

## 2025-02-10 NOTE — TELEPHONE ENCOUNTER
No care due was identified.  [facility] Embedded Care Due Messages. Reference number: [id number].   2/10/2025 8:55:25 AM CST

## 2025-02-24 RX ORDER — EZETIMIBE 10 MG/1
10 TABLET ORAL NIGHTLY
Qty: 90 TABLET | Refills: 1 | Status: SHIPPED | OUTPATIENT
Start: 2025-02-24

## 2025-02-24 NOTE — TELEPHONE ENCOUNTER
Refill Decision Note   Libby Natalie  is requesting a refill authorization.  Brief Assessment and Rationale for Refill:  Approve     Medication Therapy Plan:         Comments:     Note composed:1:41 PM 02/24/2025

## 2025-02-24 NOTE — TELEPHONE ENCOUNTER
No care due was identified.  [facility] Embedded Care Due Messages. Reference number: [id number].   2/24/2025 8:57:24 AM CST

## 2025-04-12 PROBLEM — Z71.89 ACP (ADVANCE CARE PLANNING): Status: ACTIVE | Noted: 2025-04-12

## 2025-04-12 PROBLEM — M18.11 ARTHRITIS OF CARPOMETACARPAL (CMC) JOINT OF RIGHT THUMB: Status: RESOLVED | Noted: 2017-10-17 | Resolved: 2025-04-12

## 2025-04-12 PROBLEM — E87.20 LACTIC ACIDOSIS: Status: RESOLVED | Noted: 2023-04-22 | Resolved: 2025-04-12

## 2025-04-12 PROBLEM — M65.311 TRIGGER THUMB OF RIGHT HAND: Status: RESOLVED | Noted: 2017-10-17 | Resolved: 2025-04-12

## 2025-04-12 PROBLEM — I10 UNCONTROLLED HYPERTENSION: Status: ACTIVE | Noted: 2025-04-12

## 2025-04-12 PROBLEM — I35.0 NONRHEUMATIC AORTIC VALVE STENOSIS: Status: ACTIVE | Noted: 2025-04-12

## 2025-04-13 PROBLEM — D64.9 ANEMIA: Status: ACTIVE | Noted: 2025-04-13

## 2025-04-13 PROBLEM — E87.1 HYPONATREMIA: Status: ACTIVE | Noted: 2025-04-13

## 2025-04-14 PROBLEM — R54 AGE-RELATED PHYSICAL DEBILITY: Status: ACTIVE | Noted: 2025-04-14

## 2025-04-23 ENCOUNTER — TELEPHONE (OUTPATIENT)
Dept: PAIN MEDICINE | Facility: CLINIC | Age: 89
End: 2025-04-23
Payer: MEDICARE

## 2025-04-23 NOTE — TELEPHONE ENCOUNTER
Attempted to call patient to discuss appointment scheduled with Dr. Ferraro April 25. Patient is a previous patient of Dr. Figueroa (Approx. 2 years ago ) Need to reschedule correctly. No answer, left message to call back, [employer] message sent.

## 2025-07-03 PROBLEM — Z95.3 S/P TAVR (TRANSCATHETER AORTIC VALVE REPLACEMENT): Status: ACTIVE | Noted: 2025-07-03

## 2025-07-15 ENCOUNTER — OUTPATIENT CASE MANAGEMENT (OUTPATIENT)
Dept: ADMINISTRATIVE | Facility: OTHER | Age: 89
End: 2025-07-15
Payer: MEDICARE

## 2025-07-15 NOTE — PROGRESS NOTES
SW contacted patient regarding referral. SW explained to patient reason for referral to assist with social needs. Patient denied having any needs. Patient reports residing in Independent Living with [facility]. Patient reports having a great support system ( Daughter ) who assists. Patient reports HH SN is also providing assistance. Patient reports facility offers transportation M-Thursday. Patient denied having any social needs. SW will clsoe case as no needs.

## (undated) DEVICE — SCALPEL #11 BLADE STRL DISP

## (undated) DEVICE — SYR GLASS 5CC LUER LOK

## (undated) DEVICE — SUT SILK 0 SH 30IN BLK BR

## (undated) DEVICE — CHLORAPREP W TINT 26ML APPL

## (undated) DEVICE — ADHESIVE MASTISOL VIAL 48/BX

## (undated) DEVICE — SEE MEDLINE ITEM 157131

## (undated) DEVICE — SYR DISP LL 5CC

## (undated) DEVICE — UNDERGLOVES BIOGEL PI SIZE 8

## (undated) DEVICE — SOL SOD CHLORIDE 0.9% 10ML

## (undated) DEVICE — SPONGE DERMACEA 4X4IN 12PLY

## (undated) DEVICE — APPLICATOR CHLORAPREP CLR 10.5

## (undated) DEVICE — PACK EYE CUSTOM COVINGTON.

## (undated) DEVICE — SEE MEDLINE ITEM 152622

## (undated) DEVICE — GAUZE SPONGE 4X4 12PLY

## (undated) DEVICE — DRAPE INCISE IOBAN 2 23X17IN

## (undated) DEVICE — DRAPE STERI-DRAPE 1000 17X11IN

## (undated) DEVICE — SPONGE WEC CEL SPEARS

## (undated) DEVICE — TRAY NERVE BLOCK

## (undated) DEVICE — NDL SPINAL SPINOCAN 22GX3.5

## (undated) DEVICE — SEE MEDLINE ITEM 157128

## (undated) DEVICE — GOWN SURG 2XL DISP TIE BACK

## (undated) DEVICE — GLOVE 7.5 PROTEXIS PI MICRO

## (undated) DEVICE — BNDG COFLEX FOAM LF2 ST 4X5YD

## (undated) DEVICE — DRAPE C ARM 42 X 120 10/BX

## (undated) DEVICE — Device

## (undated) DEVICE — GLOVE PROTEXIS HYDROGEL SZ8

## (undated) DEVICE — SEE L#120831

## (undated) DEVICE — TOWEL OR NONABSORB ADH 17X26

## (undated) DEVICE — DURAPREP SURG SCRUB 26ML

## (undated) DEVICE — SHIELD COLLAGEN 12HR CORNEAL

## (undated) DEVICE — SOL BETADINE 5%

## (undated) DEVICE — GLOVE BIOGEL PI MICRO SZ 7.5

## (undated) DEVICE — CLOSURE SKIN STERI STRIP 1/2X4

## (undated) DEVICE — SEE MEDLINE ITEM 157148

## (undated) DEVICE — REMOVER LOTION

## (undated) DEVICE — SOL IRR BSS OPHTH 500ML STRL

## (undated) DEVICE — COVER OVERHEAD SURG LT BLUE

## (undated) DEVICE — SYR 3CC LUER LOC

## (undated) DEVICE — PACK OPHTHALMIC

## (undated) DEVICE — TIP I/A CURVED SINGLE USE